# Patient Record
Sex: FEMALE | Race: WHITE | NOT HISPANIC OR LATINO | Employment: FULL TIME | URBAN - METROPOLITAN AREA
[De-identification: names, ages, dates, MRNs, and addresses within clinical notes are randomized per-mention and may not be internally consistent; named-entity substitution may affect disease eponyms.]

---

## 2017-01-29 ENCOUNTER — HOSPITAL ENCOUNTER (EMERGENCY)
Facility: HOSPITAL | Age: 22
Discharge: HOME | End: 2017-01-29
Attending: EMERGENCY MEDICINE | Admitting: EMERGENCY MEDICINE
Payer: COMMERCIAL

## 2017-01-29 ENCOUNTER — APPOINTMENT (EMERGENCY)
Dept: ULTRASOUND IMAGING | Facility: HOSPITAL | Age: 22
End: 2017-01-29
Payer: COMMERCIAL

## 2017-01-29 VITALS
WEIGHT: 145.28 LBS | TEMPERATURE: 98.8 F | SYSTOLIC BLOOD PRESSURE: 123 MMHG | DIASTOLIC BLOOD PRESSURE: 80 MMHG | RESPIRATION RATE: 16 BRPM | HEART RATE: 101 BPM | OXYGEN SATURATION: 95 %

## 2017-01-29 DIAGNOSIS — O20.0 THREATENED MISCARRIAGE IN EARLY PREGNANCY: Primary | ICD-10-CM

## 2017-01-29 LAB
ABO GROUP BLD: NORMAL
B-HCG SERPL-ACNC: 1750 MIU/ML
BACTERIA UR QL AUTO: ABNORMAL /HPF
BILIRUB UR QL STRIP: NEGATIVE
BLD GP AB SCN SERPL QL: NEGATIVE
CLARITY UR: CLEAR
COLOR UR: YELLOW
GLUCOSE UR STRIP-MCNC: NEGATIVE MG/DL
HCG UR QL: POSITIVE
HGB UR QL STRIP.AUTO: ABNORMAL
KETONES UR STRIP-MCNC: NEGATIVE MG/DL
LEUKOCYTE ESTERASE UR QL STRIP: NEGATIVE
MUCOUS THREADS UR QL AUTO: ABNORMAL
NITRITE UR QL STRIP: NEGATIVE
NON-SQ EPI CELLS URNS QL MICRO: ABNORMAL /HPF
PH UR STRIP.AUTO: 6 [PH] (ref 4.5–8)
PROT UR STRIP-MCNC: NEGATIVE MG/DL
RBC #/AREA URNS AUTO: ABNORMAL /HPF
RH BLD: POSITIVE
SP GR UR STRIP.AUTO: 1.02 (ref 1–1.03)
UROBILINOGEN UR QL STRIP.AUTO: 0.2 E.U./DL
WBC #/AREA URNS AUTO: ABNORMAL /HPF

## 2017-01-29 PROCEDURE — 86900 BLOOD TYPING SEROLOGIC ABO: CPT | Performed by: EMERGENCY MEDICINE

## 2017-01-29 PROCEDURE — 99284 EMERGENCY DEPT VISIT MOD MDM: CPT

## 2017-01-29 PROCEDURE — 86901 BLOOD TYPING SEROLOGIC RH(D): CPT | Performed by: EMERGENCY MEDICINE

## 2017-01-29 PROCEDURE — 84702 CHORIONIC GONADOTROPIN TEST: CPT | Performed by: EMERGENCY MEDICINE

## 2017-01-29 PROCEDURE — 81001 URINALYSIS AUTO W/SCOPE: CPT | Performed by: EMERGENCY MEDICINE

## 2017-01-29 PROCEDURE — 87086 URINE CULTURE/COLONY COUNT: CPT | Performed by: EMERGENCY MEDICINE

## 2017-01-29 PROCEDURE — 76856 US EXAM PELVIC COMPLETE: CPT

## 2017-01-29 PROCEDURE — 86850 RBC ANTIBODY SCREEN: CPT | Performed by: EMERGENCY MEDICINE

## 2017-01-29 PROCEDURE — 81025 URINE PREGNANCY TEST: CPT | Performed by: EMERGENCY MEDICINE

## 2017-01-29 PROCEDURE — 36415 COLL VENOUS BLD VENIPUNCTURE: CPT | Performed by: EMERGENCY MEDICINE

## 2017-01-29 PROCEDURE — 76830 TRANSVAGINAL US NON-OB: CPT

## 2017-01-30 LAB — BACTERIA UR CULT: NORMAL

## 2017-08-08 ENCOUNTER — HOSPITAL ENCOUNTER (EMERGENCY)
Facility: HOSPITAL | Age: 22
Discharge: HOME | End: 2017-08-08
Attending: EMERGENCY MEDICINE | Admitting: EMERGENCY MEDICINE

## 2017-08-08 VITALS
SYSTOLIC BLOOD PRESSURE: 97 MMHG | RESPIRATION RATE: 16 BRPM | DIASTOLIC BLOOD PRESSURE: 55 MMHG | HEART RATE: 68 BPM | WEIGHT: 147.71 LBS | OXYGEN SATURATION: 99 % | TEMPERATURE: 98.4 F

## 2017-08-08 DIAGNOSIS — N39.0 URINARY TRACT INFECTION: Primary | ICD-10-CM

## 2017-08-08 LAB
BACTERIA UR QL AUTO: ABNORMAL /HPF
BILIRUB UR QL STRIP: NEGATIVE
CLARITY UR: ABNORMAL
COLOR UR: YELLOW
GLUCOSE UR STRIP-MCNC: NEGATIVE MG/DL
HCG UR QL: NORMAL
HGB UR QL STRIP.AUTO: ABNORMAL
KETONES UR STRIP-MCNC: NEGATIVE MG/DL
LEUKOCYTE ESTERASE UR QL STRIP: ABNORMAL
NITRITE UR QL STRIP: NEGATIVE
NON-SQ EPI CELLS URNS QL MICRO: ABNORMAL /HPF
OTHER STN SPEC: ABNORMAL
PH UR STRIP.AUTO: 5.5 [PH] (ref 4.5–8)
PROT UR STRIP-MCNC: NEGATIVE MG/DL
RBC #/AREA URNS AUTO: ABNORMAL /HPF
SP GR UR STRIP.AUTO: 1.01 (ref 1–1.03)
UROBILINOGEN UR QL STRIP.AUTO: 0.2 E.U./DL
WBC #/AREA URNS AUTO: ABNORMAL /HPF

## 2017-08-08 PROCEDURE — 99283 EMERGENCY DEPT VISIT LOW MDM: CPT

## 2017-08-08 PROCEDURE — 87186 SC STD MICRODIL/AGAR DIL: CPT | Performed by: EMERGENCY MEDICINE

## 2017-08-08 PROCEDURE — 81025 URINE PREGNANCY TEST: CPT | Performed by: EMERGENCY MEDICINE

## 2017-08-08 PROCEDURE — 87077 CULTURE AEROBIC IDENTIFY: CPT | Performed by: EMERGENCY MEDICINE

## 2017-08-08 PROCEDURE — 87086 URINE CULTURE/COLONY COUNT: CPT | Performed by: EMERGENCY MEDICINE

## 2017-08-08 PROCEDURE — 81001 URINALYSIS AUTO W/SCOPE: CPT | Performed by: EMERGENCY MEDICINE

## 2017-08-08 RX ORDER — PHENAZOPYRIDINE HYDROCHLORIDE 200 MG/1
200 TABLET, FILM COATED ORAL 3 TIMES DAILY PRN
Qty: 6 TABLET | Refills: 0 | Status: SHIPPED | OUTPATIENT
Start: 2017-08-08 | End: 2017-08-10

## 2017-08-08 RX ORDER — SULFAMETHOXAZOLE AND TRIMETHOPRIM 800; 160 MG/1; MG/1
1 TABLET ORAL ONCE
Status: COMPLETED | OUTPATIENT
Start: 2017-08-08 | End: 2017-08-08

## 2017-08-08 RX ORDER — SULFAMETHOXAZOLE AND TRIMETHOPRIM 800; 160 MG/1; MG/1
1 TABLET ORAL 2 TIMES DAILY
Qty: 14 TABLET | Refills: 0 | Status: SHIPPED | OUTPATIENT
Start: 2017-08-08 | End: 2017-08-15

## 2017-08-08 RX ORDER — PHENAZOPYRIDINE HYDROCHLORIDE 100 MG/1
200 TABLET, FILM COATED ORAL ONCE
Status: COMPLETED | OUTPATIENT
Start: 2017-08-08 | End: 2017-08-08

## 2017-08-08 RX ADMIN — PHENAZOPYRIDINE HYDROCHLORIDE 200 MG: 100 TABLET ORAL at 08:04

## 2017-08-08 RX ADMIN — SULFAMETHOXAZOLE AND TRIMETHOPRIM 1 TABLET: 800; 160 TABLET ORAL at 08:04

## 2017-08-10 LAB — BACTERIA UR CULT: NORMAL

## 2017-08-14 ENCOUNTER — HOSPITAL ENCOUNTER (EMERGENCY)
Facility: HOSPITAL | Age: 22
Discharge: HOME | End: 2017-08-14
Attending: EMERGENCY MEDICINE | Admitting: EMERGENCY MEDICINE

## 2017-08-14 VITALS
DIASTOLIC BLOOD PRESSURE: 69 MMHG | HEART RATE: 86 BPM | RESPIRATION RATE: 18 BRPM | TEMPERATURE: 98.7 F | SYSTOLIC BLOOD PRESSURE: 129 MMHG | OXYGEN SATURATION: 99 %

## 2017-08-14 DIAGNOSIS — Z34.90 PREGNANCY: Primary | ICD-10-CM

## 2017-08-14 LAB
CLARITY, POC: CLEAR
COLOR, POC: YELLOW
EXT BILIRUBIN, UA: NEGATIVE
EXT BLOOD URINE: NEGATIVE
EXT GLUCOSE, UA: NEGATIVE
EXT KETONES: NEGATIVE
EXT NITRITE, UA: NEGATIVE
EXT PH, UA: 6
EXT PROTEIN, UA: NEGATIVE
EXT SPECIFIC GRAVITY, UA: 1.01
EXT UROBILINOGEN: NEGATIVE
HCG UR QL: POSITIVE
WBC # BLD EST: NEGATIVE 10*3/UL

## 2017-08-14 PROCEDURE — 81025 URINE PREGNANCY TEST: CPT | Performed by: EMERGENCY MEDICINE

## 2017-08-14 PROCEDURE — 81002 URINALYSIS NONAUTO W/O SCOPE: CPT | Performed by: EMERGENCY MEDICINE

## 2017-08-14 PROCEDURE — 99284 EMERGENCY DEPT VISIT MOD MDM: CPT

## 2017-09-07 ENCOUNTER — HOSPITAL ENCOUNTER (EMERGENCY)
Facility: HOSPITAL | Age: 22
Discharge: HOME | End: 2017-09-08
Attending: EMERGENCY MEDICINE | Admitting: EMERGENCY MEDICINE
Payer: COMMERCIAL

## 2017-09-07 ENCOUNTER — APPOINTMENT (EMERGENCY)
Dept: ULTRASOUND IMAGING | Facility: HOSPITAL | Age: 22
End: 2017-09-07
Payer: COMMERCIAL

## 2017-09-07 DIAGNOSIS — O20.0 THREATENED MISCARRIAGE IN EARLY PREGNANCY: Primary | ICD-10-CM

## 2017-09-07 LAB
ABO GROUP BLD: NORMAL
AMORPH PHOS CRY URNS QL MICRO: ABNORMAL /HPF
APTT PPP: 32 SECONDS (ref 23–35)
B-HCG SERPL-ACNC: ABNORMAL MIU/ML
BACTERIA UR QL AUTO: ABNORMAL /HPF
BASOPHILS # BLD AUTO: 0.03 THOUSANDS/ΜL (ref 0–0.1)
BASOPHILS NFR BLD AUTO: 0 % (ref 0–1)
BILIRUB UR QL STRIP: NEGATIVE
BLD GP AB SCN SERPL QL: NEGATIVE
CLARITY UR: CLEAR
COLOR UR: YELLOW
EOSINOPHIL # BLD AUTO: 0.08 THOUSAND/ΜL (ref 0–0.61)
EOSINOPHIL NFR BLD AUTO: 1 % (ref 0–6)
ERYTHROCYTE [DISTWIDTH] IN BLOOD BY AUTOMATED COUNT: 12.3 % (ref 11.6–15.1)
GLUCOSE UR STRIP-MCNC: NEGATIVE MG/DL
HCG UR QL: POSITIVE
HCT VFR BLD AUTO: 35.4 % (ref 34.8–46.1)
HGB BLD-MCNC: 12 G/DL (ref 11.5–15.4)
HGB UR QL STRIP.AUTO: ABNORMAL
INR PPP: 1.08 (ref 0.86–1.16)
KETONES UR STRIP-MCNC: NEGATIVE MG/DL
LEUKOCYTE ESTERASE UR QL STRIP: NEGATIVE
LYMPHOCYTES # BLD AUTO: 2.83 THOUSANDS/ΜL (ref 0.6–4.47)
LYMPHOCYTES NFR BLD AUTO: 33 % (ref 14–44)
MCH RBC QN AUTO: 30.9 PG (ref 26.8–34.3)
MCHC RBC AUTO-ENTMCNC: 33.9 G/DL (ref 31.4–37.4)
MCV RBC AUTO: 91 FL (ref 82–98)
MONOCYTES # BLD AUTO: 0.78 THOUSAND/ΜL (ref 0.17–1.22)
MONOCYTES NFR BLD AUTO: 9 % (ref 4–12)
NEUTROPHILS # BLD AUTO: 4.96 THOUSANDS/ΜL (ref 1.85–7.62)
NEUTS SEG NFR BLD AUTO: 57 % (ref 43–75)
NITRITE UR QL STRIP: NEGATIVE
NON-SQ EPI CELLS URNS QL MICRO: ABNORMAL /HPF
PH UR STRIP.AUTO: 7.5 [PH] (ref 4.5–8)
PLATELET # BLD AUTO: 182 THOUSANDS/UL (ref 149–390)
PMV BLD AUTO: 11 FL (ref 8.9–12.7)
PROT UR STRIP-MCNC: NEGATIVE MG/DL
PROTHROMBIN TIME: 14.3 SECONDS (ref 12.1–14.4)
RBC # BLD AUTO: 3.88 MILLION/UL (ref 3.81–5.12)
RBC #/AREA URNS AUTO: ABNORMAL /HPF
RH BLD: POSITIVE
SP GR UR STRIP.AUTO: 1.01 (ref 1–1.03)
SPECIMEN EXPIRATION DATE: NORMAL
UROBILINOGEN UR QL STRIP.AUTO: 0.2 E.U./DL
WBC # BLD AUTO: 8.68 THOUSAND/UL (ref 4.31–10.16)
WBC #/AREA URNS AUTO: ABNORMAL /HPF

## 2017-09-07 PROCEDURE — 87086 URINE CULTURE/COLONY COUNT: CPT | Performed by: EMERGENCY MEDICINE

## 2017-09-07 PROCEDURE — 86901 BLOOD TYPING SEROLOGIC RH(D): CPT | Performed by: EMERGENCY MEDICINE

## 2017-09-07 PROCEDURE — 81025 URINE PREGNANCY TEST: CPT | Performed by: EMERGENCY MEDICINE

## 2017-09-07 PROCEDURE — 85610 PROTHROMBIN TIME: CPT | Performed by: EMERGENCY MEDICINE

## 2017-09-07 PROCEDURE — 85730 THROMBOPLASTIN TIME PARTIAL: CPT | Performed by: EMERGENCY MEDICINE

## 2017-09-07 PROCEDURE — 86850 RBC ANTIBODY SCREEN: CPT | Performed by: EMERGENCY MEDICINE

## 2017-09-07 PROCEDURE — 36415 COLL VENOUS BLD VENIPUNCTURE: CPT | Performed by: EMERGENCY MEDICINE

## 2017-09-07 PROCEDURE — 81001 URINALYSIS AUTO W/SCOPE: CPT | Performed by: EMERGENCY MEDICINE

## 2017-09-07 PROCEDURE — 87591 N.GONORRHOEAE DNA AMP PROB: CPT | Performed by: EMERGENCY MEDICINE

## 2017-09-07 PROCEDURE — 96360 HYDRATION IV INFUSION INIT: CPT

## 2017-09-07 PROCEDURE — 87491 CHLMYD TRACH DNA AMP PROBE: CPT | Performed by: EMERGENCY MEDICINE

## 2017-09-07 PROCEDURE — 86900 BLOOD TYPING SEROLOGIC ABO: CPT | Performed by: EMERGENCY MEDICINE

## 2017-09-07 PROCEDURE — 84702 CHORIONIC GONADOTROPIN TEST: CPT | Performed by: EMERGENCY MEDICINE

## 2017-09-07 PROCEDURE — 85025 COMPLETE CBC W/AUTO DIFF WBC: CPT | Performed by: EMERGENCY MEDICINE

## 2017-09-07 PROCEDURE — 76801 OB US < 14 WKS SINGLE FETUS: CPT

## 2017-09-07 RX ADMIN — SODIUM CHLORIDE 1000 ML: 0.9 INJECTION, SOLUTION INTRAVENOUS at 22:51

## 2017-09-08 VITALS
OXYGEN SATURATION: 100 % | DIASTOLIC BLOOD PRESSURE: 51 MMHG | WEIGHT: 143 LBS | SYSTOLIC BLOOD PRESSURE: 111 MMHG | TEMPERATURE: 98.1 F | HEART RATE: 72 BPM | RESPIRATION RATE: 18 BRPM

## 2017-09-08 LAB
CHLAMYDIA DNA CVX QL NAA+PROBE: NORMAL
N GONORRHOEA DNA GENITAL QL NAA+PROBE: NORMAL

## 2017-09-08 PROCEDURE — 99284 EMERGENCY DEPT VISIT MOD MDM: CPT

## 2017-09-10 LAB — BACTERIA UR CULT: NORMAL

## 2017-09-12 LAB
CFTR MUT ANL BLD/T: NORMAL
EXTERNAL HEPATITIS B SURFACE ANTIGEN: NORMAL
EXTERNAL HIV-1 ANTIBODY: NORMAL
EXTERNAL RUBELLA IGG QUANTITATION: NORMAL
EXTERNAL SYPHILIS RPR SCREEN: NORMAL

## 2017-09-27 LAB — EXTERNAL GROUP B STREP ANTIGEN: POSITIVE

## 2017-10-05 ENCOUNTER — GENERIC CONVERSION - ENCOUNTER (OUTPATIENT)
Dept: OTHER | Facility: OTHER | Age: 22
End: 2017-10-05

## 2017-10-10 ENCOUNTER — GENERIC CONVERSION - ENCOUNTER (OUTPATIENT)
Dept: OTHER | Facility: OTHER | Age: 22
End: 2017-10-10

## 2017-10-31 ENCOUNTER — GENERIC CONVERSION - ENCOUNTER (OUTPATIENT)
Dept: OTHER | Facility: OTHER | Age: 22
End: 2017-10-31

## 2017-10-31 ENCOUNTER — ALLSCRIPTS OFFICE VISIT (OUTPATIENT)
Dept: OTHER | Facility: OTHER | Age: 22
End: 2017-10-31

## 2017-11-08 LAB — EXTERNAL AFP: NORMAL

## 2017-11-26 ENCOUNTER — APPOINTMENT (EMERGENCY)
Dept: ULTRASOUND IMAGING | Facility: HOSPITAL | Age: 22
End: 2017-11-26
Payer: COMMERCIAL

## 2017-11-26 ENCOUNTER — HOSPITAL ENCOUNTER (EMERGENCY)
Facility: HOSPITAL | Age: 22
Discharge: HOME | End: 2017-11-26
Attending: EMERGENCY MEDICINE | Admitting: EMERGENCY MEDICINE
Payer: COMMERCIAL

## 2017-11-26 VITALS
RESPIRATION RATE: 16 BRPM | TEMPERATURE: 98.1 F | BODY MASS INDEX: 24.68 KG/M2 | HEART RATE: 60 BPM | WEIGHT: 148.15 LBS | HEIGHT: 65 IN | OXYGEN SATURATION: 100 % | SYSTOLIC BLOOD PRESSURE: 92 MMHG | DIASTOLIC BLOOD PRESSURE: 53 MMHG

## 2017-11-26 DIAGNOSIS — R10.2 PELVIC PAIN DURING PREGNANCY: Primary | ICD-10-CM

## 2017-11-26 DIAGNOSIS — O26.899 PELVIC PAIN DURING PREGNANCY: Primary | ICD-10-CM

## 2017-11-26 LAB
ABO GROUP BLD: NORMAL
ALBUMIN SERPL BCP-MCNC: 2.9 G/DL (ref 3.5–5)
ALP SERPL-CCNC: 53 U/L (ref 46–116)
ALT SERPL W P-5'-P-CCNC: 22 U/L (ref 12–78)
ANION GAP SERPL CALCULATED.3IONS-SCNC: 8 MMOL/L (ref 4–13)
AST SERPL W P-5'-P-CCNC: 17 U/L (ref 5–45)
BASOPHILS # BLD AUTO: 0.01 THOUSANDS/ΜL (ref 0–0.1)
BASOPHILS NFR BLD AUTO: 0 % (ref 0–1)
BILIRUB SERPL-MCNC: 0.2 MG/DL (ref 0.2–1)
BLD GP AB SCN SERPL QL: NEGATIVE
BUN SERPL-MCNC: 6 MG/DL (ref 5–25)
CALCIUM SERPL-MCNC: 8.4 MG/DL (ref 8.3–10.1)
CHLORIDE SERPL-SCNC: 105 MMOL/L (ref 100–108)
CLARITY, POC: CLEAR
CO2 SERPL-SCNC: 24 MMOL/L (ref 21–32)
COLOR, POC: YELLOW
CREAT SERPL-MCNC: 0.48 MG/DL (ref 0.6–1.3)
EOSINOPHIL # BLD AUTO: 0.03 THOUSAND/ΜL (ref 0–0.61)
EOSINOPHIL NFR BLD AUTO: 0 % (ref 0–6)
ERYTHROCYTE [DISTWIDTH] IN BLOOD BY AUTOMATED COUNT: 12.8 % (ref 11.6–15.1)
EXT BILIRUBIN, UA: NEGATIVE
EXT BLOOD URINE: NEGATIVE
EXT GLUCOSE, UA: NEGATIVE
EXT KETONES: NEGATIVE
EXT NITRITE, UA: NEGATIVE
EXT PH, UA: 5
EXT PROTEIN, UA: NEGATIVE
EXT SPECIFIC GRAVITY, UA: 1.01
EXT UROBILINOGEN: NEGATIVE
GFR SERPL CREATININE-BSD FRML MDRD: 141 ML/MIN/1.73SQ M
GLUCOSE SERPL-MCNC: 77 MG/DL (ref 65–140)
HCT VFR BLD AUTO: 31.7 % (ref 34.8–46.1)
HGB BLD-MCNC: 10.6 G/DL (ref 11.5–15.4)
LYMPHOCYTES # BLD AUTO: 1.41 THOUSANDS/ΜL (ref 0.6–4.47)
LYMPHOCYTES NFR BLD AUTO: 17 % (ref 14–44)
MCH RBC QN AUTO: 31.3 PG (ref 26.8–34.3)
MCHC RBC AUTO-ENTMCNC: 33.4 G/DL (ref 31.4–37.4)
MCV RBC AUTO: 94 FL (ref 82–98)
MONOCYTES # BLD AUTO: 0.57 THOUSAND/ΜL (ref 0.17–1.22)
MONOCYTES NFR BLD AUTO: 7 % (ref 4–12)
NEUTROPHILS # BLD AUTO: 6.13 THOUSANDS/ΜL (ref 1.85–7.62)
NEUTS SEG NFR BLD AUTO: 76 % (ref 43–75)
PLATELET # BLD AUTO: 187 THOUSANDS/UL (ref 149–390)
PMV BLD AUTO: 10.8 FL (ref 8.9–12.7)
POTASSIUM SERPL-SCNC: 3.4 MMOL/L (ref 3.5–5.3)
PROT SERPL-MCNC: 6.6 G/DL (ref 6.4–8.2)
RBC # BLD AUTO: 3.39 MILLION/UL (ref 3.81–5.12)
RH BLD: POSITIVE
SODIUM SERPL-SCNC: 137 MMOL/L (ref 136–145)
SPECIMEN EXPIRATION DATE: NORMAL
WBC # BLD AUTO: 8.15 THOUSAND/UL (ref 4.31–10.16)
WBC # BLD EST: NEGATIVE 10*3/UL

## 2017-11-26 PROCEDURE — 85025 COMPLETE CBC W/AUTO DIFF WBC: CPT | Performed by: EMERGENCY MEDICINE

## 2017-11-26 PROCEDURE — 86901 BLOOD TYPING SEROLOGIC RH(D): CPT | Performed by: EMERGENCY MEDICINE

## 2017-11-26 PROCEDURE — 96360 HYDRATION IV INFUSION INIT: CPT

## 2017-11-26 PROCEDURE — 86850 RBC ANTIBODY SCREEN: CPT | Performed by: EMERGENCY MEDICINE

## 2017-11-26 PROCEDURE — 76815 OB US LIMITED FETUS(S): CPT

## 2017-11-26 PROCEDURE — 81002 URINALYSIS NONAUTO W/O SCOPE: CPT | Performed by: EMERGENCY MEDICINE

## 2017-11-26 PROCEDURE — 36415 COLL VENOUS BLD VENIPUNCTURE: CPT | Performed by: EMERGENCY MEDICINE

## 2017-11-26 PROCEDURE — 80053 COMPREHEN METABOLIC PANEL: CPT | Performed by: EMERGENCY MEDICINE

## 2017-11-26 PROCEDURE — 86900 BLOOD TYPING SEROLOGIC ABO: CPT | Performed by: EMERGENCY MEDICINE

## 2017-11-26 PROCEDURE — 99284 EMERGENCY DEPT VISIT MOD MDM: CPT

## 2017-11-26 RX ORDER — PNV 119/IRON FUM/FOLIC ACID 29 MG-1 MG
1 TABLET ORAL DAILY
COMMUNITY
End: 2018-08-02 | Stop reason: ALTCHOICE

## 2017-11-26 RX ADMIN — SODIUM CHLORIDE 1000 ML: 0.9 INJECTION, SOLUTION INTRAVENOUS at 12:50

## 2017-11-26 NOTE — ED PROVIDER NOTES
History  Chief Complaint   Patient presents with    Abdominal Pain Pregnant     pt presents with c/o of lower abdominal pressure x 1 week, states taht since last night pain incrased to sharp pain that would come and go  states the LLQ was hurtinf x 3 hours last night and has ceased but this morning the whole lower abdominal region was steady sharp pain currently pain free  deniies N/V, vaginal bleeding, leakage  52-year-old G2 with a history of spontaneous miscarriage in the past comes in complaining of lower abdominal pain and pressure for the past week that has been intermittent  States last night pain increased and made her concerned  Pain at this time is currently resolved  Denies any vaginal bleeding or discharge  Pregnancy Problem   Quality: no bleeding  Severity:  Moderate  Onset quality:  Gradual  Duration:  1 week  Timing:  Intermittent  Progression:  Waxing and waning  Chronicity:  New  Prior pregnancy: no    Pregnancy confirmed by ultrasound: yes    Gestational age:  24  Prenatal care:  Regular prenatal care  Context: at rest    Ineffective treatments:  None tried  Associated symptoms: no abdominal pain, no back pain, no dyspareunia, no fatigue, no fever and no vaginal discharge    Risk factors: prior miscarriage    Risk factors: no bleeding disorder and no terminated pregnancy        Prior to Admission Medications   Prescriptions Last Dose Informant Patient Reported? Taking? Prenatal Vit-DSS-Fe Fum-FA (PRENATAL 19) tablet   Yes Yes   Sig: Take 1 tablet by mouth daily      Facility-Administered Medications: None       Past Medical History:   Diagnosis Date    Anxiety     Depression     Miscarriage        History reviewed  No pertinent surgical history  History reviewed  No pertinent family history  I have reviewed and agree with the history as documented      Social History   Substance Use Topics    Smoking status: Former Smoker     Types: Cigarettes    Smokeless tobacco: Never Used    Alcohol use No        Review of Systems   Constitutional: Negative for fatigue and fever  HENT: Negative for congestion and ear pain  Eyes: Negative for discharge and redness  Respiratory: Negative for apnea, cough, shortness of breath and wheezing  Cardiovascular: Negative for chest pain  Gastrointestinal: Negative for abdominal pain and diarrhea  Endocrine: Negative for cold intolerance and polydipsia  Genitourinary: Negative for difficulty urinating, dyspareunia, hematuria and vaginal discharge  Musculoskeletal: Negative for arthralgias and back pain  Skin: Negative for color change and rash  Allergic/Immunologic: Negative for environmental allergies and immunocompromised state  Neurological: Negative for numbness and headaches  Hematological: Negative for adenopathy  Does not bruise/bleed easily  Psychiatric/Behavioral: Negative for agitation and behavioral problems  Physical Exam  ED Triage Vitals   Temperature Pulse Respirations Blood Pressure SpO2   11/26/17 1230 11/26/17 1229 11/26/17 1229 11/26/17 1229 11/26/17 1229   98 1 °F (36 7 °C) 75 18 110/74 100 %      Temp Source Heart Rate Source Patient Position - Orthostatic VS BP Location FiO2 (%)   11/26/17 1230 11/26/17 1229 11/26/17 1229 11/26/17 1229 --   Oral Monitor Sitting Right arm       Pain Score       11/26/17 1229       No Pain           Orthostatic Vital Signs  Vitals:    11/26/17 1229 11/26/17 1245 11/26/17 1300 11/26/17 1330   BP: 110/74 105/67 99/55 92/53   Pulse: 75 71 64 60   Patient Position - Orthostatic VS: Sitting Sitting Sitting Sitting       Physical Exam   Constitutional: She is oriented to person, place, and time  Vital signs are normal  She appears well-developed and well-nourished  Non-toxic appearance  HENT:   Head: Normocephalic and atraumatic     Right Ear: Tympanic membrane and external ear normal    Left Ear: Tympanic membrane and external ear normal    Nose: Nose normal  No rhinorrhea, sinus tenderness or nasal deformity  Mouth/Throat: Uvula is midline and oropharynx is clear and moist  Normal dentition  Eyes: Conjunctivae, EOM and lids are normal  Pupils are equal, round, and reactive to light  Right eye exhibits no discharge  Left eye exhibits no discharge  Neck: Trachea normal and normal range of motion  Neck supple  No JVD present  Carotid bruit is not present  Cardiovascular: Normal rate, regular rhythm, intact distal pulses and normal pulses  No extrasystoles are present  PMI is not displaced  Pulmonary/Chest: Effort normal and breath sounds normal  No accessory muscle usage  No respiratory distress  She has no wheezes  She has no rhonchi  She has no rales  Abdominal: Soft  Normal appearance and bowel sounds are normal  She exhibits no mass  There is no tenderness  There is no rigidity, no rebound and no guarding  Musculoskeletal:        Right shoulder: She exhibits normal range of motion, no bony tenderness, no swelling and no deformity  Cervical back: Normal  She exhibits normal range of motion, no tenderness, no bony tenderness and no deformity  Lymphadenopathy:     She has no cervical adenopathy  She has no axillary adenopathy  Neurological: She is alert and oriented to person, place, and time  She has normal strength and normal reflexes  No cranial nerve deficit or sensory deficit  GCS eye subscore is 4  GCS verbal subscore is 5  GCS motor subscore is 6  Skin: Skin is warm and dry  No rash noted  Psychiatric: She has a normal mood and affect  Her speech is normal and behavior is normal    Nursing note and vitals reviewed        ED Medications  Medications   sodium chloride 0 9 % bolus 1,000 mL (0 mL Intravenous Stopped 11/26/17 1350)       Diagnostic Studies  Results Reviewed     Procedure Component Value Units Date/Time    Comprehensive metabolic panel [[id number]]  (Abnormal) Collected:  11/26/17 1302    Lab Status:  Final result Specimen: Blood from Arm, Right Updated:  11/26/17 1326     Sodium 137 mmol/L      Potassium 3 4 (L) mmol/L      Chloride 105 mmol/L      CO2 24 mmol/L      Anion Gap 8 mmol/L      BUN 6 mg/dL      Creatinine 0 48 (L) mg/dL      Glucose 77 mg/dL      Calcium 8 4 mg/dL      AST 17 U/L      ALT 22 U/L      Alkaline Phosphatase 53 U/L      Total Protein 6 6 g/dL      Albumin 2 9 (L) g/dL      Total Bilirubin 0 20 mg/dL      eGFR 141 ml/min/1 73sq m     Narrative:         National Kidney Disease Education Program recommendations are as follows:  GFR calculation is accurate only with a steady state creatinine  Chronic Kidney disease less than 60 ml/min/1 73 sq  meters  Kidney failure less than 15 ml/min/1 73 sq  meters      CBC and differential [[id number]]  (Abnormal) Collected:  11/26/17 1302    Lab Status:  Final result Specimen:  Blood from Arm, Right Updated:  11/26/17 1309     WBC 8 15 Thousand/uL      RBC 3 39 (L) Million/uL      Hemoglobin 10 6 (L) g/dL      Hematocrit 31 7 (L) %      MCV 94 fL      MCH 31 3 pg      MCHC 33 4 g/dL      RDW 12 8 %      MPV 10 8 fL      Platelets 670 Thousands/uL      Neutrophils Relative 76 (H) %      Lymphocytes Relative 17 %      Monocytes Relative 7 %      Eosinophils Relative 0 %      Basophils Relative 0 %      Neutrophils Absolute 6 13 Thousands/µL      Lymphocytes Absolute 1 41 Thousands/µL      Monocytes Absolute 0 57 Thousand/µL      Eosinophils Absolute 0 03 Thousand/µL      Basophils Absolute 0 01 Thousands/µL     POCT urinalysis dipstick [[id number]]  (Normal) Resulted:  11/26/17 1243    Lab Status:  Final result Specimen:  Urine from Urine, Other Updated:  11/26/17 1244     Color, UA yellow     Clarity, UA clear     EXT Glucose, UA negative     EXT Bilirubin, UA (Ref: Negative) negative     EXT Ketones, UA (Ref: Negative) negative     EXT Spec Grav, UA 1 010     EXT Blood, UA (Ref: Negative) negative     EXT pH, UA 5 0     EXT Protein, UA (Ref: Negative) negative     EXT Urobilinogen, UA (Ref: 0 2- 1 0) negative     EXT Leukocytes, UA (Ref: Negative) negative     EXT Nitrite, UA (Ref: Negative) negative                 US OB limited 1 + fetuses   Final Result by Jeanette Akins MD (11/26 1347)      Single live intrauterine gestation at 19 weeks 3  days with appropriate interval growth  No acute abnormality identified  Workstation performed: [id number]                    Procedures  Procedures       Phone Contacts  ED Phone Contact    ED Course  ED Course                                MDM  Number of Diagnoses or Management Options  Pelvic pain during pregnancy: new and requires workup     Amount and/or Complexity of Data Reviewed  Clinical lab tests: ordered and reviewed  Tests in the radiology section of CPT®: ordered and reviewed  Tests in the medicine section of CPT®: ordered and reviewed    Patient Progress  Patient progress: improved    CritCare Time    Disposition  Final diagnoses:   Pelvic pain during pregnancy     Time reflects when diagnosis was documented in both MDM as applicable and the Disposition within this note     Time User Action Codes Description Comment    11/26/2017  1:54 PM Watt Apt Add [O26 899,  R10 2] Pelvic pain during pregnancy       ED Disposition     ED Disposition Condition Comment    Discharge  Nayeli Jon discharge to home/self care  Condition at discharge: Good        Follow-up Information     Follow up With Specialties Details Why Contact Info    Phyllis Deluna MD Obstetrics and Gynecology Schedule an appointment as soon as possible for a visit  [address] [place] [address]  [phone]          Discharge Medication List as of 11/26/2017  1:55 PM      CONTINUE these medications which have NOT CHANGED    Details   Prenatal Vit-DSS-Fe Fum-FA (PRENATAL 19) tablet Take 1 tablet by mouth daily, Historical Med           No discharge procedures on file      ED Provider  Electronically Signed by           Kiana Leung [address],   11/27/17 9298

## 2017-11-26 NOTE — DISCHARGE INSTRUCTIONS
Abdominal Pain in Pregnancy   WHAT YOU NEED TO KNOW:   Abdominal pain during pregnancy is common  Some of the causes include heartburn, constipation, gas, false labor, and round ligament pain  Round ligament pain is caused by stretching of the ligaments that support your uterus  Abdominal pain may be caused by a health problem, such as a stomach virus or appendicitis (inflammation of the appendix)  The pain may also be caused by a problem with your pregnancy, such as a threatened miscarriage or  labor  DISCHARGE INSTRUCTIONS:   Follow up with your obstetrician within 3 days:  Write down your questions so you remember to ask them during your visits  Self-care:   · Rest may help to relieve round ligament pain  Ask your healthcare provider about other ways to relieve this pain, such as a supportive belt or pregnancy exercises  · Use a heating pad set to the lowest setting or a hot compress to apply heat to your abdomen  Do this for 20 to 30 minutes every 2 hours for as many days as directed  · Avoid quick changes in position or movements that cause pain  · Do not lie flat in bed or bend over if you have heartburn  Ask your obstetrician if you should make any changes to the foods you eat  Ask if you can take any medicines for heartburn  · Eat more fiber and drink more liquids to relieve constipation  Fiber is found in fruits, vegetables, and whole-grain foods, such as whole-wheat bread and cereals  Ask how much liquid to drink each day and which liquids are best for you  Contact your obstetrician if:  · You continue to have abdominal pain that cannot be relieved  · You have a fever  · You have questions or concerns about your condition or care  Return to the emergency department if:   · You have sudden, severe pain or cramps that are so bad that you cannot walk or talk  · You have a fast heartbeat, shortness of breath, and you feel lightheaded or faint       · You have vaginal bleeding or discharge  · You have nausea, vomiting, fever, and severe pain on your right side  © 2017 [facility] Information is for End User's use only and may not be sold, redistributed or otherwise used for commercial purposes  All illustrations and images included in CareNotes® are the copyrighted property of [employer] [facility]  or [address]  The above information is an  only  It is not intended as medical advice for individual conditions or treatments  Talk to your doctor, nurse or pharmacist before following any medical regimen to see if it is safe and effective for you

## 2017-11-26 NOTE — ED NOTES
Problem: Patient Care Overview  Goal: Plan of Care Review  Outcome: Ongoing (interventions implemented as appropriate)  Pt remains free of falls and injuries. Pt s/p lap ricarda on 4/26. Abd incisions with telfa island dressings CDI w/ dried drainage. Pt tolerating regular diet. IV abx cont as scheduled. Low grade fevers tonight, now resolved. Pt cont with use of I.S.; cont to encourage turn, cough, deep breathing. Pain managed with PRN pain med; no other complaints, will cont to monitor.        US technician at the bedside for diagnostic procedure     Damari Lynch, DARWIN  11/26/17 6743

## 2017-11-30 ENCOUNTER — GENERIC CONVERSION - ENCOUNTER (OUTPATIENT)
Dept: OBGYN CLINIC | Facility: CLINIC | Age: 22
End: 2017-11-30

## 2017-12-29 ENCOUNTER — GENERIC CONVERSION - ENCOUNTER (OUTPATIENT)
Dept: OTHER | Facility: OTHER | Age: 22
End: 2017-12-29

## 2017-12-29 DIAGNOSIS — Z34.02 ENCOUNTER FOR SUPERVISION OF NORMAL FIRST PREGNANCY IN SECOND TRIMESTER: ICD-10-CM

## 2018-01-08 ENCOUNTER — APPOINTMENT (OUTPATIENT)
Dept: LAB | Facility: CLINIC | Age: 23
End: 2018-01-08
Payer: COMMERCIAL

## 2018-01-08 DIAGNOSIS — Z34.02 ENCOUNTER FOR SUPERVISION OF NORMAL FIRST PREGNANCY IN SECOND TRIMESTER: ICD-10-CM

## 2018-01-08 LAB
BASOPHILS # BLD AUTO: 0.01 THOUSANDS/ΜL (ref 0–0.1)
BASOPHILS NFR BLD AUTO: 0 % (ref 0–1)
EOSINOPHIL # BLD AUTO: 0.18 THOUSAND/ΜL (ref 0–0.61)
EOSINOPHIL NFR BLD AUTO: 2 % (ref 0–6)
ERYTHROCYTE [DISTWIDTH] IN BLOOD BY AUTOMATED COUNT: 12.7 % (ref 11.6–15.1)
GLUCOSE 1H P 50 G GLC PO SERPL-MCNC: 106 MG/DL
HCT VFR BLD AUTO: 29 % (ref 34.8–46.1)
HGB BLD-MCNC: 9.9 G/DL (ref 11.5–15.4)
LYMPHOCYTES # BLD AUTO: 1.49 THOUSANDS/ΜL (ref 0.6–4.47)
LYMPHOCYTES NFR BLD AUTO: 19 % (ref 14–44)
MCH RBC QN AUTO: 32.4 PG (ref 26.8–34.3)
MCHC RBC AUTO-ENTMCNC: 34.1 G/DL (ref 31.4–37.4)
MCV RBC AUTO: 95 FL (ref 82–98)
MONOCYTES # BLD AUTO: 0.72 THOUSAND/ΜL (ref 0.17–1.22)
MONOCYTES NFR BLD AUTO: 9 % (ref 4–12)
NEUTROPHILS # BLD AUTO: 5.34 THOUSANDS/ΜL (ref 1.85–7.62)
NEUTS SEG NFR BLD AUTO: 70 % (ref 43–75)
NRBC BLD AUTO-RTO: 0 /100 WBCS
PLATELET # BLD AUTO: 211 THOUSANDS/UL (ref 149–390)
PMV BLD AUTO: 11.4 FL (ref 8.9–12.7)
RBC # BLD AUTO: 3.06 MILLION/UL (ref 3.81–5.12)
WBC # BLD AUTO: 7.76 THOUSAND/UL (ref 4.31–10.16)

## 2018-01-08 PROCEDURE — 85025 COMPLETE CBC W/AUTO DIFF WBC: CPT

## 2018-01-08 PROCEDURE — 36415 COLL VENOUS BLD VENIPUNCTURE: CPT

## 2018-01-08 PROCEDURE — 82950 GLUCOSE TEST: CPT

## 2018-01-09 NOTE — MISCELLANEOUS
Attached is your new obstetrical paperwork package for your upcoming appointment  Please fill out all highlighted sections of Parts 2, 3 and 4 and complete  the questionnaire and consent forms in their entirety  Please contact your insurance company to find out which lab you must use for bloodwork  Also, please bring all completed paperwork with you to your first appointment  Prior to your first  appointment, please fax or email a copy of your current insurance card (both sides) in order for our office to pre-certify your insurance  Our fax number is [phone], roslyn Lopez or email to  [email or web address]  Arrive at least 20 minutes prior to your appointment time

## 2018-01-20 ENCOUNTER — OB ABSTRACT (OUTPATIENT)
Dept: OBGYN CLINIC | Facility: CLINIC | Age: 23
End: 2018-01-20

## 2018-01-20 LAB
BACTERIA UR CULT: ABNORMAL COL/ML
CANDIDA RRNA VAG QL PROBE: ABNORMAL
Lab: NORMAL
T VAGINALIS RRNA GENITAL QL PROBE: NORMAL

## 2018-01-22 VITALS
SYSTOLIC BLOOD PRESSURE: 108 MMHG | DIASTOLIC BLOOD PRESSURE: 70 MMHG | BODY MASS INDEX: 24.16 KG/M2 | WEIGHT: 145 LBS | HEIGHT: 65 IN

## 2018-01-22 VITALS — WEIGHT: 145 LBS | BODY MASS INDEX: 24.13 KG/M2 | DIASTOLIC BLOOD PRESSURE: 68 MMHG | SYSTOLIC BLOOD PRESSURE: 118 MMHG

## 2018-01-24 VITALS — DIASTOLIC BLOOD PRESSURE: 66 MMHG | SYSTOLIC BLOOD PRESSURE: 102 MMHG | WEIGHT: 154 LBS | BODY MASS INDEX: 25.63 KG/M2

## 2018-01-29 ENCOUNTER — ROUTINE PRENATAL (OUTPATIENT)
Dept: OBGYN CLINIC | Facility: CLINIC | Age: 23
End: 2018-01-29
Payer: COMMERCIAL

## 2018-01-29 VITALS — BODY MASS INDEX: 27.29 KG/M2 | WEIGHT: 164 LBS | DIASTOLIC BLOOD PRESSURE: 80 MMHG | SYSTOLIC BLOOD PRESSURE: 112 MMHG

## 2018-01-29 DIAGNOSIS — Z3A.28 28 WEEKS GESTATION OF PREGNANCY: Primary | ICD-10-CM

## 2018-01-29 DIAGNOSIS — Z36.89 ULTRASOUND SCAN TO CHECK INTERVAL GROWTH OF FETUS: Primary | ICD-10-CM

## 2018-01-29 PROCEDURE — 99213 OFFICE O/P EST LOW 20 MIN: CPT | Performed by: OBSTETRICS & GYNECOLOGY

## 2018-01-29 NOTE — PROGRESS NOTES
Patient reports good fm, no n/v, headache, cramping, bleeding, loss of fluid, edema, dom violence, or smoking  rhianna pnv  Patient tolerating iron  Patient will schedule  Center ultrasound to evaluate growth  We should recheck CBC in 2-4 weeks  Return in 2 weeks or sooner as needed

## 2018-02-17 ENCOUNTER — ROUTINE PRENATAL (OUTPATIENT)
Dept: OBGYN CLINIC | Facility: CLINIC | Age: 23
End: 2018-02-17
Payer: COMMERCIAL

## 2018-02-17 VITALS — SYSTOLIC BLOOD PRESSURE: 120 MMHG | BODY MASS INDEX: 27.79 KG/M2 | WEIGHT: 167 LBS | DIASTOLIC BLOOD PRESSURE: 62 MMHG

## 2018-02-17 DIAGNOSIS — Z3A.31 31 WEEKS GESTATION OF PREGNANCY: ICD-10-CM

## 2018-02-17 PROBLEM — Z82.79 FAMILY HISTORY OF CONGENITAL ANOMALY: Status: ACTIVE | Noted: 2017-10-05

## 2018-02-17 PROBLEM — Z84.89 FAMILY HISTORY OF GENETIC DISEASE: Status: ACTIVE | Noted: 2017-10-05

## 2018-02-17 PROCEDURE — 99213 OFFICE O/P EST LOW 20 MIN: CPT | Performed by: OBSTETRICS & GYNECOLOGY

## 2018-02-17 NOTE — PROGRESS NOTES
Visit:  Good FM - tolerating PNV and Fe supplement - has growth US scheduled next week - given checking in information - reviewed TDap

## 2018-02-25 ENCOUNTER — HOSPITAL ENCOUNTER (EMERGENCY)
Facility: HOSPITAL | Age: 23
Discharge: HOME | End: 2018-02-25
Attending: EMERGENCY MEDICINE
Payer: COMMERCIAL

## 2018-02-25 VITALS
DIASTOLIC BLOOD PRESSURE: 61 MMHG | HEART RATE: 66 BPM | BODY MASS INDEX: 27.89 KG/M2 | SYSTOLIC BLOOD PRESSURE: 103 MMHG | RESPIRATION RATE: 16 BRPM | OXYGEN SATURATION: 99 % | WEIGHT: 167.6 LBS | TEMPERATURE: 98.3 F

## 2018-02-25 DIAGNOSIS — O21.9 NAUSEA AND VOMITING DURING PREGNANCY: Primary | ICD-10-CM

## 2018-02-25 LAB
ANION GAP SERPL CALCULATED.3IONS-SCNC: 8 MMOL/L (ref 4–13)
BASOPHILS # BLD AUTO: 0.01 THOUSANDS/ΜL (ref 0–0.1)
BASOPHILS NFR BLD AUTO: 0 % (ref 0–1)
BILIRUB UR QL STRIP: NEGATIVE
BUN SERPL-MCNC: 8 MG/DL (ref 5–25)
CALCIUM SERPL-MCNC: 8.4 MG/DL (ref 8.3–10.1)
CHLORIDE SERPL-SCNC: 105 MMOL/L (ref 100–108)
CLARITY UR: CLEAR
CO2 SERPL-SCNC: 25 MMOL/L (ref 21–32)
COLOR UR: YELLOW
CREAT SERPL-MCNC: 0.56 MG/DL (ref 0.6–1.3)
EOSINOPHIL # BLD AUTO: 0.07 THOUSAND/ΜL (ref 0–0.61)
EOSINOPHIL NFR BLD AUTO: 1 % (ref 0–6)
ERYTHROCYTE [DISTWIDTH] IN BLOOD BY AUTOMATED COUNT: 12.6 % (ref 11.6–15.1)
GFR SERPL CREATININE-BSD FRML MDRD: 134 ML/MIN/1.73SQ M
GLUCOSE SERPL-MCNC: 89 MG/DL (ref 65–140)
GLUCOSE UR STRIP-MCNC: NEGATIVE MG/DL
HCT VFR BLD AUTO: 31.1 % (ref 34.8–46.1)
HGB BLD-MCNC: 10.2 G/DL (ref 11.5–15.4)
HGB UR QL STRIP.AUTO: NEGATIVE
KETONES UR STRIP-MCNC: NEGATIVE MG/DL
LEUKOCYTE ESTERASE UR QL STRIP: NEGATIVE
LYMPHOCYTES # BLD AUTO: 1.97 THOUSANDS/ΜL (ref 0.6–4.47)
LYMPHOCYTES NFR BLD AUTO: 19 % (ref 14–44)
MCH RBC QN AUTO: 30.7 PG (ref 26.8–34.3)
MCHC RBC AUTO-ENTMCNC: 32.8 G/DL (ref 31.4–37.4)
MCV RBC AUTO: 94 FL (ref 82–98)
MONOCYTES # BLD AUTO: 0.76 THOUSAND/ΜL (ref 0.17–1.22)
MONOCYTES NFR BLD AUTO: 8 % (ref 4–12)
NEUTROPHILS # BLD AUTO: 7.36 THOUSANDS/ΜL (ref 1.85–7.62)
NEUTS SEG NFR BLD AUTO: 72 % (ref 43–75)
NITRITE UR QL STRIP: NEGATIVE
PH UR STRIP.AUTO: 6.5 [PH] (ref 4.5–8)
PLATELET # BLD AUTO: 217 THOUSANDS/UL (ref 149–390)
PMV BLD AUTO: 10.7 FL (ref 8.9–12.7)
POTASSIUM SERPL-SCNC: 3.4 MMOL/L (ref 3.5–5.3)
PROT UR STRIP-MCNC: NEGATIVE MG/DL
RBC # BLD AUTO: 3.32 MILLION/UL (ref 3.81–5.12)
SODIUM SERPL-SCNC: 138 MMOL/L (ref 136–145)
SP GR UR STRIP.AUTO: 1.02 (ref 1–1.03)
UROBILINOGEN UR QL STRIP.AUTO: 0.2 E.U./DL
WBC # BLD AUTO: 10.17 THOUSAND/UL (ref 4.31–10.16)

## 2018-02-25 PROCEDURE — 99284 EMERGENCY DEPT VISIT MOD MDM: CPT

## 2018-02-25 PROCEDURE — 80048 BASIC METABOLIC PNL TOTAL CA: CPT | Performed by: EMERGENCY MEDICINE

## 2018-02-25 PROCEDURE — 85025 COMPLETE CBC W/AUTO DIFF WBC: CPT | Performed by: EMERGENCY MEDICINE

## 2018-02-25 PROCEDURE — 81003 URINALYSIS AUTO W/O SCOPE: CPT

## 2018-02-25 PROCEDURE — 36415 COLL VENOUS BLD VENIPUNCTURE: CPT | Performed by: EMERGENCY MEDICINE

## 2018-02-25 PROCEDURE — 96360 HYDRATION IV INFUSION INIT: CPT

## 2018-02-25 RX ORDER — DOXYLAMINE SUCCINATE AND PYRIDOXINE HYDROCHLORIDE, DELAYED RELEASE TABLETS 10 MG/10 MG 10; 10 MG/1; MG/1
1 TABLET, DELAYED RELEASE ORAL 2 TIMES DAILY
Qty: 15 TABLET | Refills: 0 | Status: SHIPPED | OUTPATIENT
Start: 2018-02-25 | End: 2018-03-16

## 2018-02-25 RX ORDER — PYRIDOXINE HCL (VITAMIN B6) 50 MG
25 TABLET ORAL DAILY
Status: DISCONTINUED | OUTPATIENT
Start: 2018-02-26 | End: 2018-02-25 | Stop reason: HOSPADM

## 2018-02-25 RX ADMIN — DOXYLAMINE SUCCINATE: 25 TABLET ORAL at 17:47

## 2018-02-25 RX ADMIN — SODIUM CHLORIDE 1000 ML: 0.9 INJECTION, SOLUTION INTRAVENOUS at 17:46

## 2018-02-25 NOTE — ED PROVIDER NOTES
History  Chief Complaint   Patient presents with    Vomiting During Pregnancy     Pt presents to ED from home after vomiting 8x since 0200 today  Pt having pain in abd while vomiting  Pt 32 weeks pregnant  Pt having normal pregnancy  Pt (-) health hx      a34-year-old female, 32 weeks pregnant, presents with chief complaint of multiple episodes of nausea and vomiting since earlier this afternoon  Patient states that she has not felt sick or nauseous prior to this but then she has tried to eat today she has had multiple episodes of nausea vomiting  Patient reports she had some morning sickness early on in the pregnancy but has not had any since  No fevers, chills, unusual food exposure, recent travel, sick contacts  Patient denies vaginal discharge or bleeding  Patient states she has some mild abdominal cramping when the vomiting but not at rest   No back pain  History provided by:  Patient   used: No    Vomiting   Severity:  Moderate  Duration:  2 hours  Timing:  Rare  Quality:  Stomach contents  Progression:  Unchanged  Chronicity:  New  Recent urination:  Normal  Relieved by:  Nothing  Worsened by:  Food smell and liquids  Ineffective treatments:  None tried  Associated symptoms: abdominal pain (with emesis but not at rest)    Associated symptoms: no chills, no diarrhea and no fever    Risk factors: pregnant    Risk factors: no sick contacts, no suspect food intake and no travel to endemic areas        Prior to Admission Medications   Prescriptions Last Dose Informant Patient Reported? Taking? Prenatal Vit-DSS-Fe Fum-FA (PRENATAL 19) tablet   Yes No   Sig: Take 1 tablet by mouth daily      Facility-Administered Medications: None       Past Medical History:   Diagnosis Date    Anxiety     Depression     HX OF ANXIETY    Miscarriage     Varicella     IMMUNE       History reviewed  No pertinent surgical history      Family History   Problem Relation Age of Onset    Breast cancer Mother     Ovarian cancer Mother     Hypertension Father     Diabetes Maternal Aunt      I have reviewed and agree with the history as documented  Social History   Substance Use Topics    Smoking status: Former Smoker     Types: Cigarettes     Quit date: 2016    Smokeless tobacco: Never Used    Alcohol use No      Comment: Social use per [employer]        Review of Systems   Constitutional: Negative for chills, diaphoresis and fever  Respiratory: Negative for shortness of breath  Cardiovascular: Negative for chest pain and palpitations  Gastrointestinal: Positive for abdominal pain (with emesis but not at rest) and vomiting  Negative for diarrhea and nausea  Genitourinary: Negative for dysuria, frequency, hematuria, vaginal discharge and vaginal pain  Skin: Negative for rash  All other systems reviewed and are negative  Physical Exam  ED Triage Vitals [02/25/18 1544]   Temperature Pulse Respirations Blood Pressure SpO2   98 3 °F (36 8 °C) 65 16 115/63 98 %      Temp Source Heart Rate Source Patient Position - Orthostatic VS BP Location FiO2 (%)   Oral Monitor Sitting Left arm --      Pain Score       No Pain           Orthostatic Vital Signs  Vitals:    02/25/18 1544   BP: 115/63   Pulse: 65   Patient Position - Orthostatic VS: Sitting       Physical Exam   Constitutional: She is oriented to person, place, and time  She appears well-developed and well-nourished  She appears distressed (mild)  HENT:   Head: Normocephalic and atraumatic  Eyes: EOM are normal  Pupils are equal, round, and reactive to light  Neck: Normal range of motion  No JVD present  Cardiovascular: Normal rate, regular rhythm, normal heart sounds and intact distal pulses  Exam reveals no gallop and no friction rub  No murmur heard  Pulmonary/Chest: Effort normal and breath sounds normal  No respiratory distress  She has no wheezes  She has no rales  She exhibits no tenderness     Abdominal:   Gravid abdomen consistent with 3rd trimester     Musculoskeletal: Normal range of motion  She exhibits no tenderness  Neurological: She is alert and oriented to person, place, and time  Skin: Skin is warm and dry  Psychiatric: She has a normal mood and affect  Her behavior is normal  Judgment and thought content normal    Nursing note and vitals reviewed  ED Medications  Medications   pyridoxine (VITAMIN B6) tablet 25 mg (not administered)   sodium chloride 0 9 % bolus 1,000 mL (1,000 mL Intravenous New Bag 2/25/18 1746)   doxylamine (UNISON) tablet 12 5 mg ( Oral Given 2/25/18 1747)       Diagnostic Studies  Results Reviewed     Procedure Component Value Units Date/Time    Basic metabolic panel [[id number]]  (Abnormal) Collected:  02/25/18 1749    Lab Status:  Final result Specimen:  Blood from Arm, Right Updated:  02/25/18 1810     Sodium 138 mmol/L      Potassium 3 4 (L) mmol/L      Chloride 105 mmol/L      CO2 25 mmol/L      Anion Gap 8 mmol/L      BUN 8 mg/dL      Creatinine 0 56 (L) mg/dL      Glucose 89 mg/dL      Calcium 8 4 mg/dL      eGFR 134 ml/min/1 73sq m     Narrative:         National Kidney Disease Education Program recommendations are as follows:  GFR calculation is accurate only with a steady state creatinine  Chronic Kidney disease less than 60 ml/min/1 73 sq  meters  Kidney failure less than 15 ml/min/1 73 sq  meters      CBC and differential [[id number]]  (Abnormal) Collected:  02/25/18 1749    Lab Status:  Final result Specimen:  Blood from Arm, Right Updated:  02/25/18 1756     WBC 10 17 (H) Thousand/uL      RBC 3 32 (L) Million/uL      Hemoglobin 10 2 (L) g/dL      Hematocrit 31 1 (L) %      MCV 94 fL      MCH 30 7 pg      MCHC 32 8 g/dL      RDW 12 6 %      MPV 10 7 fL      Platelets 437 Thousands/uL      Neutrophils Relative 72 %      Lymphocytes Relative 19 %      Monocytes Relative 8 %      Eosinophils Relative 1 %      Basophils Relative 0 %      Neutrophils Absolute 7 36 Thousands/µL Lymphocytes Absolute 1 97 Thousands/µL      Monocytes Absolute 0 76 Thousand/µL      Eosinophils Absolute 0 07 Thousand/µL      Basophils Absolute 0 01 Thousands/µL                  No orders to display              Procedures  Procedures       Phone Contacts  ED Phone Contact    ED Course  ED Course                                MDM  Number of Diagnoses or Management Options  Nausea and vomiting during pregnancy: new and requires workup  Diagnosis management comments: Background: 25 y o  female presents with nausea and vomiting in 3rd trimester    Differential DX includes but is not limited to: gastritis, gastroparesis, gestational diabetes, food poisoning, viral disease, doubt hellp syndrome, doubt premature labor    Plan: IV hydration, cbc, cmp, urinalysis - combination of pyridoxine and doxyalamine therapy       Amount and/or Complexity of Data Reviewed  Clinical lab tests: ordered and reviewed    Risk of Complications, Morbidity, and/or Mortality  Diagnostic procedures: high  Management options: high    Patient Progress  Patient progress: stable    CritCare Time    Disposition  Final diagnoses:   Nausea and vomiting during pregnancy     Time reflects when diagnosis was documented in both MDM as applicable and the Disposition within this note     Time User Action Codes Description Comment    2/25/2018  6:21 PM Angeles CHRISTIANSON Add [O21 9] Nausea and vomiting during pregnancy       ED Disposition     ED Disposition Condition Comment    Discharge  Nayeli Jon discharge to home/self care      Condition at discharge: Good        Follow-up Information     Follow up With Specialties Details Why Brittaney Colindres MD Obstetrics and Gynecology Schedule an appointment as soon as possible for a visit As needed [address]  [phone]          Patient's Medications   Discharge Prescriptions    DOXYLAMINE-PYRIDOXINE 10-10 MG TBEC    Take 1 tablet by mouth 2 (two) times a day for 15 doses       Start Date: 2/25/2018 End Date: 3/5/2018       Order Dose: 1 tablet       Quantity: 15 tablet    Refills: 0     No discharge procedures on file      ED Provider  Electronically Signed by           Sanjana Moore MD  02/25/18 2793

## 2018-02-25 NOTE — DISCHARGE INSTRUCTIONS
Acute Nausea and Vomiting   WHAT YOU NEED TO KNOW:   Acute nausea and vomiting start suddenly, worsen quickly, and last a short time  DISCHARGE INSTRUCTIONS:   Return to the emergency department if:   · You see blood in your vomit or your bowel movements  · You have sudden, severe pain in your chest and upper abdomen after hard vomiting or retching  · You have swelling in your neck and chest      · You are dizzy, cold, and thirsty and your eyes and mouth are dry  · You are urinating very little or not at all  · You have muscle weakness, leg cramps, and trouble breathing  · Your heart is beating much faster than normal      · You continue to vomit for more than 48 hours  Contact your healthcare provider if:   · You have frequent dry heaves (vomiting but nothing comes out)  · Your nausea and vomiting does not get better or go away after you use medicine  · You have questions or concerns about your condition or treatment  Medicines: You may need any of the following:  · Medicines  may be given to calm your stomach and stop your vomiting  You may also need medicines to help you feel more relaxed or to stop nausea and vomiting caused by motion sickness  · Gastrointestinal stimulants  are used to help empty your stomach and bowels  This may help decrease nausea and vomiting  · Take your medicine as directed  Contact your healthcare provider if you think your medicine is not helping or if you have side effects  Tell him or her if you are allergic to any medicine  Keep a list of the medicines, vitamins, and herbs you take  Include the amounts, and when and why you take them  Bring the list or the pill bottles to follow-up visits  Carry your medicine list with you in case of an emergency  Prevent or manage acute nausea and vomiting:   · Do not drink alcohol  Alcohol may upset or irritate your stomach  Too much alcohol can also cause acute nausea and vomiting  · Control stress    Headaches due to stress may cause nausea and vomiting  Find ways to relax and manage your stress  Get more rest and sleep  · Drink more liquids as directed  Vomiting can lead to dehydration  It is important to drink more liquids to help replace lost body fluids  Ask your healthcare provider how much liquid to drink each day and which liquids are best for you  Your provider may recommend that you drink an oral rehydration solution (ORS)  ORS contains water, salts, and sugar that are needed to replace the lost body fluids  Ask what kind of ORS to use, how much to drink, and where to get it  · Eat smaller meals, more often  Eat small amounts of food every 2 to 3 hours, even if you are not hungry  Food in your stomach may decrease your nausea  · Talk to your healthcare provider before you take over-the-counter (OTC) medicines  These medicines can cause serious problems if you use certain other medicines, or you have a medical condition  You may have problems if you use too much or use them for longer than the label says  Follow directions on the label carefully  Follow up with your healthcare provider as directed:  Write down your questions so you remember to ask them during your follow-up visits  © 2017 [employer] Information is for End User's use only and may not be sold, redistributed or otherwise used for commercial purposes  All illustrations and images included in CareNotes® are the copyrighted property of A D A M , Inc  or [facility]  The above information is an  only  It is not intended as medical advice for individual conditions or treatments  Talk to your doctor, nurse or pharmacist before following any medical regimen to see if it is safe and effective for you

## 2018-03-03 ENCOUNTER — ROUTINE PRENATAL (OUTPATIENT)
Dept: OBGYN CLINIC | Facility: CLINIC | Age: 23
End: 2018-03-03
Payer: COMMERCIAL

## 2018-03-03 VITALS — BODY MASS INDEX: 28.62 KG/M2 | WEIGHT: 172 LBS | SYSTOLIC BLOOD PRESSURE: 112 MMHG | DIASTOLIC BLOOD PRESSURE: 78 MMHG

## 2018-03-03 DIAGNOSIS — Z34.83 MULTIGRAVIDA IN THIRD TRIMESTER: Primary | ICD-10-CM

## 2018-03-03 PROCEDURE — 99214 OFFICE O/P EST MOD 30 MIN: CPT | Performed by: PHYSICIAN ASSISTANT

## 2018-03-03 NOTE — PROGRESS NOTES
Pt with complaints of insomnia, unable to fall and stay asleep  Advised trial of Benadryl  Was in ER last weekend w n/v  Got fluids and felt much better  No cramping or ctxns, no vb/lof  GBS n/v    Pt taking Fe Qd

## 2018-03-07 NOTE — PROGRESS NOTES
Education  Baby & Me Education 1st Trimester:   First Trimester Education provided:   benefits of breastfeeding, importance of exclusive breastfeeding, early initiation of breastfeeding, exclusive breastfeeding for the first 6 months and Pregnancy Essentials Reference Guide given   The patient is not planning on breastfeeding  Prenatal education provided by: Gracy Soriano PA-C     Baby & Me Education 2nd Trimester:   Second Trimester Education provided:   benefits of breastfeeding, importance of exclusive breastfeeding, early initiation of breastfeeding, exclusive breastfeeding for the first 6 months, non-pharmacologic pain relief methods for labor, rooming-in on 24 hour basis and Pregnancy Essentials Reference Guide given  Mother has not registered for prenatal class  Thought has not been given to selecting a pediatrician  The mother has not discussed personal preferences with her provider     Prenatal education provided by: Gracy SHANE      Signatures   Electronically signed by : Gracy Soriano, [facility]; Oct 31 2017  2:46PM EST                       (Author)

## 2018-03-16 ENCOUNTER — ROUTINE PRENATAL (OUTPATIENT)
Dept: OBGYN CLINIC | Facility: CLINIC | Age: 23
End: 2018-03-16
Payer: COMMERCIAL

## 2018-03-16 VITALS — WEIGHT: 177 LBS | BODY MASS INDEX: 29.45 KG/M2 | SYSTOLIC BLOOD PRESSURE: 104 MMHG | DIASTOLIC BLOOD PRESSURE: 70 MMHG

## 2018-03-16 DIAGNOSIS — Z34.83 MULTIGRAVIDA IN THIRD TRIMESTER: ICD-10-CM

## 2018-03-16 DIAGNOSIS — Z3A.35 35 WEEKS GESTATION OF PREGNANCY: Primary | ICD-10-CM

## 2018-03-16 PROCEDURE — 99213 OFFICE O/P EST LOW 20 MIN: CPT | Performed by: OBSTETRICS & GYNECOLOGY

## 2018-03-18 LAB — GP B STREP DNA SPEC QL NAA+PROBE: POSITIVE

## 2018-03-23 ENCOUNTER — ROUTINE PRENATAL (OUTPATIENT)
Dept: OBGYN CLINIC | Facility: CLINIC | Age: 23
End: 2018-03-23
Payer: COMMERCIAL

## 2018-03-23 VITALS — SYSTOLIC BLOOD PRESSURE: 130 MMHG | WEIGHT: 178 LBS | DIASTOLIC BLOOD PRESSURE: 74 MMHG | BODY MASS INDEX: 29.62 KG/M2

## 2018-03-23 DIAGNOSIS — Z3A.36 36 WEEKS GESTATION OF PREGNANCY: Primary | ICD-10-CM

## 2018-03-23 PROCEDURE — 99213 OFFICE O/P EST LOW 20 MIN: CPT | Performed by: OBSTETRICS & GYNECOLOGY

## 2018-03-23 NOTE — PROGRESS NOTES
Labor Talk done considering epidural, reviewed signs of labor, checking in visit done, how to call, reviewed gbs pos  Patient reports good fm, no n/v,  bleeding, loss of fluid, edema, dom violence, or smoking  rhianna pnv, some headache a lot of pressure and some cramping off and on

## 2018-03-30 ENCOUNTER — ROUTINE PRENATAL (OUTPATIENT)
Dept: OBGYN CLINIC | Facility: CLINIC | Age: 23
End: 2018-03-30
Payer: COMMERCIAL

## 2018-03-30 VITALS
BODY MASS INDEX: 29.49 KG/M2 | WEIGHT: 177 LBS | HEIGHT: 65 IN | SYSTOLIC BLOOD PRESSURE: 118 MMHG | DIASTOLIC BLOOD PRESSURE: 80 MMHG

## 2018-03-30 DIAGNOSIS — Z34.90 ENCOUNTER FOR SUPERVISION OF NORMAL PREGNANCY, ANTEPARTUM, UNSPECIFIED GRAVIDITY: Primary | ICD-10-CM

## 2018-03-30 PROBLEM — Z3A.35 35 WEEKS GESTATION OF PREGNANCY: Status: RESOLVED | Noted: 2018-03-16 | Resolved: 2018-03-30

## 2018-03-30 PROCEDURE — 99213 OFFICE O/P EST LOW 20 MIN: CPT | Performed by: PHYSICIAN ASSISTANT

## 2018-03-30 NOTE — PROGRESS NOTES
VISIT: (+) Cramping - last night strong contractions every 5 min for about 30 min and then stopped; Noticing more vaginal discharge; Denies n/v/HA//vb/lof/edema/dv/smoking  PNVs + DHA - tolerating daily  Good FM - r/angélica 10 kicks/2 hrs   Tdap - not done and patient desires - encourage to get ASAP at this time    No change in cervix  Reviewed signs and symptoms of labor and when to call; Reviewed signs and symptoms of pregnancy induced hypertension or preeclampsia and when to call  RTO in 1 weeks for routine ob check or sooner if needed

## 2018-04-06 ENCOUNTER — ROUTINE PRENATAL (OUTPATIENT)
Dept: OBGYN CLINIC | Facility: CLINIC | Age: 23
End: 2018-04-06

## 2018-04-06 VITALS
DIASTOLIC BLOOD PRESSURE: 72 MMHG | BODY MASS INDEX: 30.49 KG/M2 | HEIGHT: 65 IN | WEIGHT: 183 LBS | SYSTOLIC BLOOD PRESSURE: 122 MMHG

## 2018-04-06 DIAGNOSIS — Z34.90 ENCOUNTER FOR SUPERVISION OF NORMAL PREGNANCY, ANTEPARTUM, UNSPECIFIED GRAVIDITY: Primary | ICD-10-CM

## 2018-04-06 PROCEDURE — PNV: Performed by: PHYSICIAN ASSISTANT

## 2018-04-13 ENCOUNTER — ROUTINE PRENATAL (OUTPATIENT)
Dept: OBGYN CLINIC | Facility: CLINIC | Age: 23
End: 2018-04-13
Payer: COMMERCIAL

## 2018-04-13 VITALS — WEIGHT: 187 LBS | DIASTOLIC BLOOD PRESSURE: 78 MMHG | BODY MASS INDEX: 31.12 KG/M2 | SYSTOLIC BLOOD PRESSURE: 118 MMHG

## 2018-04-13 DIAGNOSIS — Z34.83 PRENATAL CARE, SUBSEQUENT PREGNANCY, THIRD TRIMESTER: Primary | ICD-10-CM

## 2018-04-13 PROCEDURE — 99213 OFFICE O/P EST LOW 20 MIN: CPT | Performed by: NURSE PRACTITIONER

## 2018-04-13 NOTE — PROGRESS NOTES
OFFICE VISIT: Denies any N/V, HA, Cramping, VB, LOF, Edema, Domestic Violence, Smoking  + FM  Tolerating PNV  RTO 1 week or sooner as needed

## 2018-04-19 ENCOUNTER — ROUTINE PRENATAL (OUTPATIENT)
Dept: OBGYN CLINIC | Facility: CLINIC | Age: 23
End: 2018-04-19
Payer: COMMERCIAL

## 2018-04-19 VITALS — DIASTOLIC BLOOD PRESSURE: 82 MMHG | BODY MASS INDEX: 31.45 KG/M2 | WEIGHT: 189 LBS | SYSTOLIC BLOOD PRESSURE: 122 MMHG

## 2018-04-19 DIAGNOSIS — Z34.03 ENCOUNTER FOR SUPERVISION OF NORMAL FIRST PREGNANCY IN THIRD TRIMESTER: Primary | ICD-10-CM

## 2018-04-19 PROCEDURE — 59025 FETAL NON-STRESS TEST: CPT | Performed by: NURSE PRACTITIONER

## 2018-04-19 PROCEDURE — 99213 OFFICE O/P EST LOW 20 MIN: CPT | Performed by: NURSE PRACTITIONER

## 2018-04-19 NOTE — PROGRESS NOTES
OFFICE VISIT: Pt presents today for routine prenatal visit  Pt presented to appointment with complaints of decreased fetal movement  Pt states she hasn't felt the baby move much over the last two days  States she will go hours without any movement then baby will move "like crazy" for a few hours then will not feel baby move again  Reviewed kick counts with patient and how to assess for fetal movement  Pt states occasional cramping, nothing regular or painful  Occasional headaches but has a history of migraines in the past  Denies any epigastric pain, edema, or visual changes  Labor precautions reviewed  Reviewed will look into induction at 41 weeks gestation or sooner if clinically indicated  RTO Monday for NST and ANURADHA

## 2018-04-23 ENCOUNTER — ROUTINE PRENATAL (OUTPATIENT)
Dept: OBGYN CLINIC | Facility: CLINIC | Age: 23
End: 2018-04-23
Payer: COMMERCIAL

## 2018-04-23 VITALS
WEIGHT: 186 LBS | SYSTOLIC BLOOD PRESSURE: 108 MMHG | HEART RATE: 72 BPM | BODY MASS INDEX: 30.99 KG/M2 | HEIGHT: 65 IN | DIASTOLIC BLOOD PRESSURE: 68 MMHG

## 2018-04-23 DIAGNOSIS — Z34.90 ENCOUNTER FOR SUPERVISION OF NORMAL PREGNANCY, ANTEPARTUM, UNSPECIFIED GRAVIDITY: Primary | ICD-10-CM

## 2018-04-23 PROCEDURE — 99213 OFFICE O/P EST LOW 20 MIN: CPT | Performed by: PHYSICIAN ASSISTANT

## 2018-04-23 NOTE — PROGRESS NOTES
VISIT: (+) Cramping - irregular tightening lower pelvis; (+) HA - coming and going and tolerating; (+) Edema - feet and ankles when on feet - resolves with rest; Denies n/v/vb/lof/dv/smoking  PNVs + DHA - tolerating daily  Good FM - r/angélica 10 kicks/2 hrs     NST - reactive with good accels and no decels; baseline 130s; toco - silent; ANURADHA - 10 cm with largest pocket of 5 cm  No change in cervix - 0/50/-1  Reviewed signs and symptoms of labor and when to call; Reviewed signs and symptoms of pregnancy induced hypertension or preeclampsia and when to call  IOL scheduled for Wed evening 8 pm

## 2018-04-25 ENCOUNTER — HOSPITAL ENCOUNTER (INPATIENT)
Facility: HOSPITAL | Age: 23
LOS: 3 days | Discharge: HOME | DRG: 560 | End: 2018-04-28
Attending: OBSTETRICS & GYNECOLOGY | Admitting: OBSTETRICS & GYNECOLOGY
Payer: COMMERCIAL

## 2018-04-25 ENCOUNTER — HOSPITAL ENCOUNTER (OUTPATIENT)
Dept: LABOR AND DELIVERY | Facility: HOSPITAL | Age: 23
Discharge: HOME | DRG: 560 | End: 2018-04-25
Payer: COMMERCIAL

## 2018-04-25 PROBLEM — O48.0 41 WEEKS GESTATION OF PREGNANCY: Status: ACTIVE | Noted: 2018-04-25

## 2018-04-25 PROBLEM — Z34.83 MULTIGRAVIDA IN THIRD TRIMESTER: Status: RESOLVED | Noted: 2018-03-03 | Resolved: 2018-04-25

## 2018-04-25 PROBLEM — O48.0 POST-TERM PREGNANCY, 40-42 WEEKS OF GESTATION: Status: ACTIVE | Noted: 2018-04-25

## 2018-04-25 PROBLEM — Z34.90 ENCOUNTER FOR INDUCTION OF LABOR: Status: ACTIVE | Noted: 2018-04-25

## 2018-04-25 PROBLEM — Z3A.41 41 WEEKS GESTATION OF PREGNANCY: Status: ACTIVE | Noted: 2018-04-25

## 2018-04-25 LAB
ABO GROUP BLD: NORMAL
AMPHETAMINES SERPL QL SCN: NEGATIVE
BARBITURATES UR QL: NEGATIVE
BASOPHILS # BLD AUTO: 0.01 THOUSANDS/ΜL (ref 0–0.1)
BASOPHILS NFR BLD AUTO: 0 % (ref 0–1)
BENZODIAZ UR QL: NEGATIVE
BLD GP AB SCN SERPL QL: NEGATIVE
COCAINE UR QL: NEGATIVE
EOSINOPHIL # BLD AUTO: 0.08 THOUSAND/ΜL (ref 0–0.61)
EOSINOPHIL NFR BLD AUTO: 1 % (ref 0–6)
ERYTHROCYTE [DISTWIDTH] IN BLOOD BY AUTOMATED COUNT: 14.2 % (ref 11.6–15.1)
HCT VFR BLD AUTO: 31.8 % (ref 34.8–46.1)
HGB BLD-MCNC: 10.2 G/DL (ref 11.5–15.4)
LYMPHOCYTES # BLD AUTO: 2.27 THOUSANDS/ΜL (ref 0.6–4.47)
LYMPHOCYTES NFR BLD AUTO: 19 % (ref 14–44)
MCH RBC QN AUTO: 28.8 PG (ref 26.8–34.3)
MCHC RBC AUTO-ENTMCNC: 32.1 G/DL (ref 31.4–37.4)
MCV RBC AUTO: 90 FL (ref 82–98)
METHADONE UR QL: NEGATIVE
MONOCYTES # BLD AUTO: 0.77 THOUSAND/ΜL (ref 0.17–1.22)
MONOCYTES NFR BLD AUTO: 7 % (ref 4–12)
NEUTROPHILS # BLD AUTO: 8.67 THOUSANDS/ΜL (ref 1.85–7.62)
NEUTS SEG NFR BLD AUTO: 73 % (ref 43–75)
NRBC BLD AUTO-RTO: 0 /100 WBCS
OPIATES UR QL SCN: NEGATIVE
PCP UR QL: NEGATIVE
PLATELET # BLD AUTO: 228 THOUSANDS/UL (ref 149–390)
PMV BLD AUTO: 11.3 FL (ref 8.9–12.7)
RBC # BLD AUTO: 3.54 MILLION/UL (ref 3.81–5.12)
RH BLD: POSITIVE
SPECIMEN EXPIRATION DATE: NORMAL
THC UR QL: NEGATIVE
WBC # BLD AUTO: 11.84 THOUSAND/UL (ref 4.31–10.16)

## 2018-04-25 PROCEDURE — 80307 DRUG TEST PRSMV CHEM ANLYZR: CPT | Performed by: OBSTETRICS & GYNECOLOGY

## 2018-04-25 PROCEDURE — 85025 COMPLETE CBC W/AUTO DIFF WBC: CPT | Performed by: OBSTETRICS & GYNECOLOGY

## 2018-04-25 PROCEDURE — 86901 BLOOD TYPING SEROLOGIC RH(D): CPT | Performed by: OBSTETRICS & GYNECOLOGY

## 2018-04-25 PROCEDURE — 86850 RBC ANTIBODY SCREEN: CPT | Performed by: OBSTETRICS & GYNECOLOGY

## 2018-04-25 PROCEDURE — 86900 BLOOD TYPING SEROLOGIC ABO: CPT | Performed by: OBSTETRICS & GYNECOLOGY

## 2018-04-25 PROCEDURE — 86592 SYPHILIS TEST NON-TREP QUAL: CPT | Performed by: OBSTETRICS & GYNECOLOGY

## 2018-04-25 RX ORDER — BUTORPHANOL TARTRATE 1 MG/ML
1 INJECTION, SOLUTION INTRAMUSCULAR; INTRAVENOUS
Status: DISCONTINUED | OUTPATIENT
Start: 2018-04-25 | End: 2018-04-26

## 2018-04-25 RX ORDER — ONDANSETRON 2 MG/ML
4 INJECTION INTRAMUSCULAR; INTRAVENOUS EVERY 6 HOURS PRN
Status: DISCONTINUED | OUTPATIENT
Start: 2018-04-25 | End: 2018-04-26

## 2018-04-25 RX ORDER — SODIUM CHLORIDE, SODIUM LACTATE, POTASSIUM CHLORIDE, CALCIUM CHLORIDE 600; 310; 30; 20 MG/100ML; MG/100ML; MG/100ML; MG/100ML
125 INJECTION, SOLUTION INTRAVENOUS CONTINUOUS
Status: DISCONTINUED | OUTPATIENT
Start: 2018-04-25 | End: 2018-04-26

## 2018-04-25 RX ADMIN — SODIUM CHLORIDE, SODIUM LACTATE, POTASSIUM CHLORIDE, AND CALCIUM CHLORIDE 125 ML/HR: .6; .31; .03; .02 INJECTION, SOLUTION INTRAVENOUS at 23:36

## 2018-04-25 RX ADMIN — BUTORPHANOL TARTRATE 1 MG: 1 INJECTION, SOLUTION INTRAMUSCULAR; INTRAVENOUS at 23:36

## 2018-04-25 NOTE — H&P
H&P Exam - Obstetrics- Family Medicine Resident  Dat Jon 25 y o  female MRN: [id number]  Unit/Bed#: LD Triage  Encounter: [id number]        ASSESSMENT:  Desiree Haines is a 25 y o , , 41w0d admitted for IOL for late-term, presented in early labor    PLAN:  1  Admit for induction of labor for late-term, patient appears in early labor, with contractions every 3 minutes  Cervical exam 2 days prior /-1, changed today /2   -no cervical ripening with cytotec    -if early labor becomes protracted, will initiate low dose pitocin  2  GBS: PCN administration with more active labor or spontaneous rupture  3  Fetal well being: cat 1 strip, 130s baseline, moderate variability, frequent accelerations  4  Routine labs: cbc, RPR, and type&screen pending  5  FEN: clear liquid diet, 125cc/hr LR  6  Analgesia upon maternal request  7  Hx of anxiety and depression with suicidal ideation in  - controlled without medications, no recent suicidal thoughts  8  Expectant management: predict vaginal delivery    D/w Dr Pan Dense    >2 Midnights  INPATIENT     History of Present Illness   Chief Complaint: induction of labor for post dates    HPI:  Desiree Haines is a 25 y o    female with an KHOI of 2018 who is being admitted for IOL for post term  Her current obstetrical history is uncomplicated  No pregnancy complications  Patient is GBS positive  Patient reports she lost her mucous plug this morning, and has been adán every 5 minutes since 4pm this evening  Reports some vaginal spotting  Feeling adequate fetal movement  Denies HA or blurry vision  Review of Systems   Constitutional: Negative for chills and fever  Eyes: Negative for visual disturbance  Respiratory: Negative for shortness of breath  Cardiovascular: Negative for chest pain  Gastrointestinal: Negative for abdominal pain  Genitourinary: Negative for frequency and vaginal pain     Neurological: Negative for headaches  Historical Information   OB History    Para Term  AB Living   2       1 0   SAB TAB Ectopic Multiple Live Births   1              # Outcome Date GA Lbr Thai/2nd Weight Sex Delivery Anes PTL Lv   2 Current            1 SAB 17                Baby complications/comments: none  Past Medical History:   Diagnosis Date    Anxiety     Depression     HX OF ANXIETY    Miscarriage     Varicella     IMMUNE     No past surgical history on file  Social History   History   Alcohol Use No     Comment: Social use per [employer]     History   Drug Use No     Comment: hx of Marijuana use, stopped w/positive UPT     History   Smoking Status    Former Smoker    Types: Cigarettes    Quit date:    Smokeless Tobacco    Never Used     Family History:   Family History   Problem Relation Age of Onset    Breast cancer Mother     Ovarian cancer Mother     Hypertension Father     Diabetes Maternal Aunt        Meds/Allergies   {  Prescriptions Prior to Admission   Medication    Ferrous Sulfate 134 MG TABS    Prenatal Vit-DSS-Fe Fum-FA (PRENATAL 19) tablet     No Known Allergies    Objective   Vitals: Last menstrual period 2017, unknown if currently breastfeeding  There is no height or weight on file to calculate BMI  Invasive Devices          No matching active lines, drains, or airways          PHYSICAL EXAM  General: No acute distress  Heart: Regular rate & rhythm  No murmurs/clicks/gallops  Lungs: Clear bilaterally  Normal effort  No wheezes/rales/rhonchi  ____  Abdominal: Soft  NT/ND  Gravid  Extremities: No TTP  Lower limbs symmetric bilaterally     SVE: 1/80/-2 posterior  FHR: Cat 1, 130s baseline, moderate variability, frequent accelerations  [place]: q 3 mins    Prenatal Labs:   Blood Type:   Lab Results   Component Value Date/Time    ABO Grouping A 2017 01:02 PM     , D (Rh type):   Lab Results   Component Value Date/Time    Rh Factor Positive 2017 01:02 PM , Antibody Screen:   Lab Results   Component Value Date/Time    Antibody Screen Negative 11/26/2017 01:02 PM    , HCT/HGB:   Lab Results   Component Value Date/Time    Hematocrit 31 1 (L) 02/25/2018 05:49 PM    Hemoglobin 10 2 (L) 02/25/2018 05:49 PM      , Platelets:   Lab Results   Component Value Date/Time    Platelets 599 88/86/7616 05:49 PM      , 1 hour Glucola:   Lab Results   Component Value Date/Time    Glucose 106 01/08/2018 11:18 AM       , Hep B:   Lab Results   Component Value Date/Time    External Hepatitis B Surface Ag NEG 09/12/2017     Lab Results   Component Value Date/Time    External HIV-1 Antibody NEG 09/12/2017     Lab Results   Component Value Date/Time    N gonorrhoeae, DNA Probe N  gonorrhoeae Amplified DNA Negative 09/07/2017 10:44 PM   Group B Strep:  Positive  Rubella:  Immune  Chlamydia: negative  RPR: negative      Signature/Title: Chioma Granda MD  Date: 4/25/2018  Time: 7:35 PM

## 2018-04-26 ENCOUNTER — ANESTHESIA (INPATIENT)
Dept: LABOR AND DELIVERY | Facility: HOSPITAL | Age: 23
DRG: 560 | End: 2018-04-26
Payer: COMMERCIAL

## 2018-04-26 LAB
BASE EXCESS BLDCOA CALC-SCNC: -4.3 MMOL/L (ref 3–11)
BASE EXCESS BLDCOV CALC-SCNC: -5.2 MMOL/L (ref 1–9)
HCO3 BLDCOA-SCNC: 23.7 MMOL/L (ref 17.3–27.3)
HCO3 BLDCOV-SCNC: 22.1 MMOL/L (ref 12.2–28.6)
O2 CT VFR BLDCOA CALC: 5.3 ML/DL
OXYHGB MFR BLDCOA: 24.4 %
OXYHGB MFR BLDCOV: 34.5 %
PCO2 BLDCOA: 54.8 MM[HG] (ref 30–60)
PCO2 BLDCOV: 49.4 MM HG (ref 27–43)
PH BLDCOA: 7.25 [PH] (ref 7.23–7.43)
PH BLDCOV: 7.27 [PH] (ref 7.19–7.49)
PO2 BLDCOA: 15.1 MM HG (ref 5–25)
PO2 BLDCOV: 18 MM HG (ref 15–45)
RPR SER QL: NORMAL
SAO2 % BLDCOV: 7.4 ML/DL

## 2018-04-26 PROCEDURE — 0HQ9XZZ REPAIR PERINEUM SKIN, EXTERNAL APPROACH: ICD-10-PCS | Performed by: OBSTETRICS & GYNECOLOGY

## 2018-04-26 PROCEDURE — 59409 OBSTETRICAL CARE: CPT | Performed by: OBSTETRICS & GYNECOLOGY

## 2018-04-26 PROCEDURE — 82805 BLOOD GASES W/O2 SATURATION: CPT | Performed by: OBSTETRICS & GYNECOLOGY

## 2018-04-26 RX ORDER — DOCUSATE SODIUM 100 MG/1
100 CAPSULE, LIQUID FILLED ORAL 2 TIMES DAILY
Status: DISCONTINUED | OUTPATIENT
Start: 2018-04-26 | End: 2018-04-28 | Stop reason: HOSPADM

## 2018-04-26 RX ORDER — IBUPROFEN 600 MG/1
600 TABLET ORAL EVERY 6 HOURS PRN
Status: DISCONTINUED | OUTPATIENT
Start: 2018-04-26 | End: 2018-04-27

## 2018-04-26 RX ORDER — ONDANSETRON 2 MG/ML
4 INJECTION INTRAMUSCULAR; INTRAVENOUS EVERY 8 HOURS PRN
Status: DISCONTINUED | OUTPATIENT
Start: 2018-04-26 | End: 2018-04-28 | Stop reason: HOSPADM

## 2018-04-26 RX ORDER — BUPIVACAINE HYDROCHLORIDE 2.5 MG/ML
INJECTION, SOLUTION INFILTRATION; PERINEURAL AS NEEDED
Status: DISCONTINUED | OUTPATIENT
Start: 2018-04-26 | End: 2018-04-26 | Stop reason: SURG

## 2018-04-26 RX ORDER — OXYCODONE HYDROCHLORIDE AND ACETAMINOPHEN 5; 325 MG/1; MG/1
1 TABLET ORAL EVERY 4 HOURS PRN
Status: DISCONTINUED | OUTPATIENT
Start: 2018-04-26 | End: 2018-04-28 | Stop reason: HOSPADM

## 2018-04-26 RX ORDER — OXYTOCIN/RINGER'S LACTATE 30/500 ML
1-30 PLASTIC BAG, INJECTION (ML) INTRAVENOUS
Status: DISCONTINUED | OUTPATIENT
Start: 2018-04-26 | End: 2018-04-26

## 2018-04-26 RX ORDER — DIPHENHYDRAMINE HCL 25 MG
25 TABLET ORAL EVERY 6 HOURS PRN
Status: DISCONTINUED | OUTPATIENT
Start: 2018-04-26 | End: 2018-04-28 | Stop reason: HOSPADM

## 2018-04-26 RX ORDER — FENTANYL CITRATE 50 UG/ML
INJECTION, SOLUTION INTRAMUSCULAR; INTRAVENOUS
Status: COMPLETED
Start: 2018-04-26 | End: 2018-04-26

## 2018-04-26 RX ORDER — OXYTOCIN/RINGER'S LACTATE 30/500 ML
250 PLASTIC BAG, INJECTION (ML) INTRAVENOUS CONTINUOUS
Status: DISCONTINUED | OUTPATIENT
Start: 2018-04-26 | End: 2018-04-28 | Stop reason: HOSPADM

## 2018-04-26 RX ORDER — OXYTOCIN/RINGER'S LACTATE 30/500 ML
PLASTIC BAG, INJECTION (ML) INTRAVENOUS
Status: DISPENSED
Start: 2018-04-26 | End: 2018-04-26

## 2018-04-26 RX ORDER — LIDOCAINE HYDROCHLORIDE AND EPINEPHRINE 15; 5 MG/ML; UG/ML
INJECTION, SOLUTION EPIDURAL AS NEEDED
Status: DISCONTINUED | OUTPATIENT
Start: 2018-04-26 | End: 2018-04-26 | Stop reason: SURG

## 2018-04-26 RX ORDER — TERBUTALINE SULFATE 1 MG/ML
INJECTION, SOLUTION SUBCUTANEOUS
Status: COMPLETED
Start: 2018-04-26 | End: 2018-04-26

## 2018-04-26 RX ORDER — FENTANYL CITRATE 50 UG/ML
INJECTION, SOLUTION INTRAMUSCULAR; INTRAVENOUS AS NEEDED
Status: DISCONTINUED | OUTPATIENT
Start: 2018-04-26 | End: 2018-04-26 | Stop reason: SURG

## 2018-04-26 RX ORDER — DIAPER,BRIEF,INFANT-TODD,DISP
1 EACH MISCELLANEOUS AS NEEDED
Status: DISCONTINUED | OUTPATIENT
Start: 2018-04-26 | End: 2018-04-28 | Stop reason: HOSPADM

## 2018-04-26 RX ORDER — CALCIUM CARBONATE 200(500)MG
1000 TABLET,CHEWABLE ORAL DAILY PRN
Status: DISCONTINUED | OUTPATIENT
Start: 2018-04-26 | End: 2018-04-28 | Stop reason: HOSPADM

## 2018-04-26 RX ORDER — BUTORPHANOL TARTRATE 1 MG/ML
1 INJECTION, SOLUTION INTRAMUSCULAR; INTRAVENOUS ONCE
Status: COMPLETED | OUTPATIENT
Start: 2018-04-26 | End: 2018-04-26

## 2018-04-26 RX ORDER — SODIUM CHLORIDE, SODIUM LACTATE, POTASSIUM CHLORIDE, CALCIUM CHLORIDE 600; 310; 30; 20 MG/100ML; MG/100ML; MG/100ML; MG/100ML
100 INJECTION, SOLUTION INTRAVENOUS CONTINUOUS
Status: DISCONTINUED | OUTPATIENT
Start: 2018-04-26 | End: 2018-04-26

## 2018-04-26 RX ORDER — OXYCODONE HYDROCHLORIDE AND ACETAMINOPHEN 5; 325 MG/1; MG/1
2 TABLET ORAL EVERY 4 HOURS PRN
Status: DISCONTINUED | OUTPATIENT
Start: 2018-04-26 | End: 2018-04-28 | Stop reason: HOSPADM

## 2018-04-26 RX ORDER — BUTORPHANOL TARTRATE 1 MG/ML
INJECTION, SOLUTION INTRAMUSCULAR; INTRAVENOUS
Status: COMPLETED
Start: 2018-04-26 | End: 2018-04-26

## 2018-04-26 RX ADMIN — SODIUM CHLORIDE, SODIUM LACTATE, POTASSIUM CHLORIDE, AND CALCIUM CHLORIDE 125 ML/HR: .6; .31; .03; .02 INJECTION, SOLUTION INTRAVENOUS at 04:32

## 2018-04-26 RX ADMIN — SODIUM CHLORIDE 2.5 MILLION UNITS: 9 INJECTION, SOLUTION INTRAVENOUS at 07:28

## 2018-04-26 RX ADMIN — SODIUM CHLORIDE, SODIUM LACTATE, POTASSIUM CHLORIDE, AND CALCIUM CHLORIDE 125 ML/HR: .6; .31; .03; .02 INJECTION, SOLUTION INTRAVENOUS at 05:56

## 2018-04-26 RX ADMIN — BUPIVACAINE HYDROCHLORIDE 3 ML: 2.5 INJECTION, SOLUTION INFILTRATION; PERINEURAL at 04:30

## 2018-04-26 RX ADMIN — BUTORPHANOL TARTRATE 1 MG: 1 INJECTION, SOLUTION INTRAMUSCULAR; INTRAVENOUS at 01:55

## 2018-04-26 RX ADMIN — Medication 1 TABLET: at 13:26

## 2018-04-26 RX ADMIN — DOCUSATE SODIUM 100 MG: 100 CAPSULE, LIQUID FILLED ORAL at 17:43

## 2018-04-26 RX ADMIN — TERBUTALINE SULFATE 0.25 MG: 1 INJECTION, SOLUTION SUBCUTANEOUS at 05:20

## 2018-04-26 RX ADMIN — BUPIVACAINE HYDROCHLORIDE 2 ML: 2.5 INJECTION, SOLUTION INFILTRATION; PERINEURAL at 04:33

## 2018-04-26 RX ADMIN — BENZOCAINE AND LEVOMENTHOL: 200; 5 SPRAY TOPICAL at 15:08

## 2018-04-26 RX ADMIN — Medication 2 MILLI-UNITS/MIN: at 07:05

## 2018-04-26 RX ADMIN — BUPIVACAINE HYDROCHLORIDE 3 ML: 2.5 INJECTION, SOLUTION INFILTRATION; PERINEURAL at 04:27

## 2018-04-26 RX ADMIN — WITCH HAZEL 1 PAD: 500 SOLUTION RECTAL; TOPICAL at 15:08

## 2018-04-26 RX ADMIN — FENTANYL CITRATE 25 MCG: 50 INJECTION, SOLUTION INTRAMUSCULAR; INTRAVENOUS at 04:30

## 2018-04-26 RX ADMIN — LIDOCAINE HYDROCHLORIDE AND EPINEPHRINE 2.5 ML: 15; 5 INJECTION, SOLUTION EPIDURAL at 04:39

## 2018-04-26 RX ADMIN — ROPIVACAINE HYDROCHLORIDE: 2 INJECTION, SOLUTION EPIDURAL; INFILTRATION at 09:12

## 2018-04-26 RX ADMIN — FENTANYL CITRATE 25 MCG: 50 INJECTION, SOLUTION INTRAMUSCULAR; INTRAVENOUS at 04:27

## 2018-04-26 RX ADMIN — Medication 2 MILLI-UNITS/MIN: at 00:19

## 2018-04-26 RX ADMIN — HYDROCORTISONE 1 APPLICATION: 1 CREAM TOPICAL at 15:08

## 2018-04-26 RX ADMIN — IBUPROFEN 600 MG: 600 TABLET ORAL at 23:29

## 2018-04-26 RX ADMIN — SODIUM CHLORIDE, SODIUM LACTATE, POTASSIUM CHLORIDE, AND CALCIUM CHLORIDE 100 ML/HR: .6; .31; .03; .02 INJECTION, SOLUTION INTRAVENOUS at 08:30

## 2018-04-26 RX ADMIN — LIDOCAINE HYDROCHLORIDE AND EPINEPHRINE 3 ML: 15; 5 INJECTION, SOLUTION EPIDURAL at 04:26

## 2018-04-26 RX ADMIN — SODIUM CHLORIDE 5 MILLION UNITS: 0.9 INJECTION, SOLUTION INTRAVENOUS at 03:34

## 2018-04-26 RX ADMIN — ROPIVACAINE HYDROCHLORIDE: 2 INJECTION, SOLUTION EPIDURAL; INFILTRATION at 04:40

## 2018-04-26 RX ADMIN — IBUPROFEN 600 MG: 600 TABLET ORAL at 16:13

## 2018-04-26 NOTE — DISCHARGE SUMMARY
Discharge Summary - Beatris Aschoff Raisner 25 y o  female MRN: [id number]    Unit/Bed#: -01 Encounter: [id number]    Admission Date: 2018     Discharge Date: 18    Admitting Diagnosis:   1  Pregnancy at 41w1d  2  Late-term gestation  3  Induced labor  4  GBS positive status s/p PCN, adequately treated   5  Hx of anxiety and depression with suicidal ideation in     Discharge Diagnosis: same, delivered    Procedures: spontaneous vaginal delivery, repair of 1° perineal & left periurethral lacerations    Attending: EDUARDA Valdes  Hospital Course:   Nayeli Jon is a 25 y o   at 41w1d wks who was initially admitted for induction of labor for late-term gestation  Pt was started on pitocin for induction  Pt experienced spontaneous rupture of membranes at  0230  Pt received an epidural for anesthesia  FHT demonstrated recurrent variable decelerations s/p epidural  Pt was internalized with IUPC and FSE  FHT returned to category I s/p interventions with amnioinfusion, maternal repositioning, oxygen IVFs, and terbutaline  Pitocin titration was started after continued Category I tracing  Pt progressed to complete dilation at 1059  She delivered a viable male  on 18 at 1134  Weight 7lbs 11oz via spontaneous vaginal delivery  Bedside nurse resuscitators evaluated the   Apgars were 9 (1 min) and 9 (5 min)  Patient tolerated the procedure well and recovered in the labor room with the  before being transferred to the postpartum floor in stable condition  Her postpartum course was uncomplicated  Pre-delivery hemoglobin was 10 2  Her postpartum pain was well controlled with oral analgesics  On day of discharge, she was ambulating and able to reasonably perform all ADLs  She was voiding and had appropriate bowel function  Pain was well controlled  She was discharged home on post-operative day #2 without complications   Patient was instructed to follow up with her OB as an outpatient and was given appropriate warnings to call provider if she develops signs of infection or uncontrolled pain  Complications: none apparent    Condition at discharge: good     Discharge instructions/Information to patient and family:   See after visit summary for information provided to patient and family  Provisions for Follow-Up Care:  See after visit summary for information related to follow-up care and any pertinent home health orders        Disposition: Home    Planned Readmission: No      Marie Dinh MD  OBGYN, PGY-1  4/28/2018 6:42 AM

## 2018-04-26 NOTE — PLAN OF CARE
DISCHARGE PLANNING     Discharge to home or other facility with appropriate resources Progressing        INFECTION - ADULT     Absence or prevention of progression during hospitalization Progressing     Absence of fever/infection during neutropenic period Progressing        Knowledge Deficit     Verbalizes understanding of labor plan Progressing     Patient/family/caregiver demonstrates understanding of disease process, treatment plan, medications, and discharge instructions Progressing        PAIN - ADULT     Verbalizes/displays adequate comfort level or baseline comfort level Progressing        POSTPARTUM     Experiences normal postpartum course Progressing     Appropriate maternal -  bonding Progressing     Establishment of infant feeding pattern Progressing     Incision(s), wounds(s) or drain site(s) healing without S/S of infection Progressing        SAFETY ADULT     Patient will remain free of falls Progressing     Maintain or return to baseline ADL function Progressing     Maintain or return mobility status to optimal level Progressing

## 2018-04-26 NOTE — ANESTHESIA PROCEDURE NOTES
Epidural Block    Patient location during procedure: OB  Start time: 4/26/2018 4:26 AM  Reason for block: procedure for pain and at surgeon's request  Staffing  Anesthesiologist: Sonya Montoya  Performed: anesthesiologist   Preanesthetic Checklist  Completed: patient identified, site marked, surgical consent, pre-op evaluation, timeout performed, IV checked, risks and benefits discussed and monitors and equipment checked  Epidural  Patient position: sitting  Prep: Betadine  Patient monitoring: heart rate, continuous pulse ox and frequent blood pressure checks  Approach: midline  Location: lumbar (1-5) (L5/S1)  Injection technique: SHANNAN saline  Needle  Needle type: Tuohy   Epidural needle gauge: 17 g  Catheter type: side hole  Catheter size: 19 g springwound  Catheter at skin depth: 12 5 cm  Test dose: negative and lidocaine 1 5% with epinephrine 1-to-200,000negative aspiration for CSF, negative aspiration for heme and no paresthesia on injection  patient tolerated the procedure well with no immediate complications  Additional Notes  Pt positioned sitting, timeout, sterile prep and drape  Placement x 1 attempt at L5/S1 with SHANNAN to NS  Attempted needle through needle CSE but no CSF  Cath threaded easily, negative aspiration and test, catheter dosed incrementally  Patient laid supine with THEA, PCEA initiated, + relief

## 2018-04-26 NOTE — PROGRESS NOTES
Patient Mora Steele at [facility] initiated at that time  Patient is now very uncomfortable and asking for an epidural  Will bring patient out to a room and get her comfortable with an epidural  Then reassess

## 2018-04-26 NOTE — OB LABOR/OXYTOCIN SAFETY PROGRESS
Oxytocin Safety Progress Check Note - Kevyn Jon 25 y o  female MRN: [id number]    Unit/Bed#: -01 Encounter: [id number]    Obstetric History       T0      L0     SAB1   TAB0   Ectopic0   Multiple0   Live Births0      Gestational Age: 41w1d  Dose (abhi-units/min) Oxytocin: 4 abhi-units/min  Contraction Frequency (minutes): 3-4  Contraction Quality: Mild  Tachysystole: No   Dilation: 7        Effacement (%): 100  Station: 0  Baseline Rate: 130 bpm  Fetal Heart Rate: 130 BPM  FHR Category: Category II          Notes/comments:      Occasional variable overall reassuring  Expectant management         Bharat Patel MD 2018 8:03 AM

## 2018-04-26 NOTE — PLAN OF CARE
Labor & Delivery     Manages discomfort Completed     Patient vital signs are stable Completed          DISCHARGE PLANNING     Discharge to home or other facility with appropriate resources Not Progressing        INFECTION - ADULT     Absence or prevention of progression during hospitalization Not Progressing     Absence of fever/infection during neutropenic period Not Progressing        Knowledge Deficit     Verbalizes understanding of labor plan Not Progressing     Patient/family/caregiver demonstrates understanding of disease process, treatment plan, medications, and discharge instructions Not Progressing        PAIN - ADULT     Verbalizes/displays adequate comfort level or baseline comfort level Not Progressing        POSTPARTUM     Experiences normal postpartum course Not Progressing     Appropriate maternal -  bonding Not Progressing     Establishment of infant feeding pattern Not Progressing     Incision(s), wounds(s) or drain site(s) healing without S/S of infection Not Progressing        SAFETY ADULT     Patient will remain free of falls Not Progressing     Maintain or return to baseline ADL function Not Progressing     Maintain or return mobility status to optimal level Not Progressing

## 2018-04-26 NOTE — PROGRESS NOTES
FHT has recovered without decelerations for the past 40 minutes  Will start pitocin titration as contraction pattern has spaced out    D/w Dr Qasim Medina

## 2018-04-26 NOTE — L&D DELIVERY NOTE
DELIVERY NOTE  Dione Jon 25 y o  female MRN: [id number]  Unit/Bed#:  305-01 Encounter: [id number]    Obstetrician:   Dr Manjula Thomas    Assistant: Dr Maxine Powers    Pre-Delivery Diagnosis: Term pregnancy at 41w1d  Induced labor  Late term gestation  Single fetus  GBS positive  Hx of anxiety and depression with suicidal ideations    Post-Delivery Diagnosis: Same as above - Delivered  1st degree laceration  Left Periurethral tear    Procedure: Spontaneous vaginal delivery  Repair of 1st degree and left periurethral spontaneous lacerations      Indications for instrumental delivery: None    Estimated Blood Loss:  250mL    Cord Blood Gases:   Arterial pH:7 254, BE: -4 3  Venous pH: 7 268, BE: -5 2    Complications:  None    Description of Delivery:   Rose Brandon is a 25 y o   at 41w1d wks who was initially admitted for induction of labor for late-term gestation  Pt was started on pitocin for induction  Pt experienced spontaneous rupture of membranes at  0230  Pt received an epidural for anesthesia  FHT demonstrated recurrent variable decelerations s/p epidural  Pt was internalized with IUPC and FSE  FHT returned to category I s/p interventions with amnioinfusion, maternal repositioning, oxygen IVFs, and terbutaline  Pitocin titration was started after continued Category I tracing  Pt progressed to complete dilation at 1059  After pushing for 25 mins, patient delivered a viable Male  over 1st degree and left periurethral lacerations at 1134, APGARs 9/9, weight pending  A nuchal cord was no noted  With the assistance of maternal expulsive efforts and downward traction of fetal head, the anterior shoulder was delivered without difficulty, followed by the remainder of the infant's body  The  was placed on the mother's chest and provided routine care by the  staff  There was delayed clamping of the umbilical cord, after which, was doubly clamped and cut   Umbilical cord blood and umbilical artery and venous gases were collected  Placenta was delivered with fundal massage and gentle traction on the cord with active management of the third stage of labor  Placenta delivered intact with a 3-vessel cord at 1137  Active management of the third stage of labor was undertaken with IV pitocin  Bimanual exam was performed to evacuate clots and uterus noted to be firm and contracted down well  Bleeding was noted to be under control  Inspection of the perineum, vagina, labia, and urethra revealed 1st degree and left periurethral lacerations which were then repaired in standard fashion with 3-0 Vicryl rapid  The lacerations showed good tissue reapproximation and hemostasis  Mother and baby are currently recovering nicely in stable condition  Dr Arturo June was present and participated in the entire delivery      Concha Lovett MD  [facility], PGY-1  4/26/2018 12:07 PM

## 2018-04-26 NOTE — ANESTHESIA PREPROCEDURE EVALUATION
26 yo  at 41 weeks undergoing IOL, requesting labor analgesia  Review of Systems/Medical History  Patient summary reviewed  Chart reviewed  No history of anesthetic complications (none prior)     Cardiovascular  Negative cardio ROS    Pulmonary  Negative pulmonary ROS        GI/Hepatic  Negative GI/hepatic ROS          Negative  ROS        Endo/Other    Obesity (BMI 31)    GYN  Currently pregnant , Prior pregnancy/OB history : 2 Parity: 0,          Hematology  Negative hematology ROS      Musculoskeletal  Negative musculoskeletal ROS        Neurology  Negative neurology ROS      Psychology   Anxiety, Depression ,            Physical Exam    Airway    Mallampati score: I  TM Distance: >3 FB  Neck ROM: full     Dental   Comment: Tongue piercing removed, No notable dental hx     Cardiovascular  Comment: Negative ROS,     Pulmonary      Other Findings      Lab Results   Component Value Date    HGB 10 2 (L) 2018     2018     Anesthesia Plan  ASA Score- 2     Anesthesia Type- epidural and spinal with ASA Monitors  Additional Monitors:   Airway Plan:         Plan Factors-    Induction- intravenous  Postoperative Plan-     Informed Consent- Anesthetic plan and risks discussed with patient

## 2018-04-26 NOTE — DISCHARGE INSTRUCTIONS
Vaginal Delivery   WHAT YOU SHOULD KNOW:   A vaginal delivery is the birth of your baby through your vagina (birth canal)  AFTER YOU LEAVE:   Medicines:  · NSAIDs  help decrease swelling and pain or fever  This medicine is available with or without a doctor's order  NSAIDs can cause stomach bleeding or kidney problems in certain people  If you take blood thinner medicine, always ask your healthcare provider if NSAIDs are safe for you  Always read the medicine label and follow directions  · Take your medicine as directed  Call your healthcare provider if you think your medicine is not helping or if you have side effects  Tell him if you are allergic to any medicine  Keep a list of the medicines, vitamins, and herbs you take  Include the amounts, and when and why you take them  Bring the list or the pill bottles to follow-up visits  Carry your medicine list with you in case of an emergency  Follow up with your primary healthcare provider:  Most women need to return 6 weeks after a vaginal delivery  Ask about how to care for your wounds or stitches  Write down your questions so you remember to ask them during your visits  Activity:  Rest as much as possible  Try to keep all activities short  You may be able to do some exercise soon after you have your baby  Talk with your primary healthcare provider before you start exercising  If you work outside the home, ask when you can return to your job  Kegel exercises:  Kegel exercises may help your vaginal and rectal muscles heal faster  You can do Kegel exercises by tightening and relaxing the muscles around your vagina  Kegel exercises help make the muscles stronger  Breast care:  When your milk comes in, your breasts may feel full and hard  Ask how to care for your breasts, even if you are not breastfeeding  Constipation:  Do not try to push the bowel movement out if it is too hard   High-fiber foods, extra liquids, and regular exercise can help you prevent constipation  Examples of high-fiber foods are fruit and bran  Prune juice and water are good liquids to drink  Regular exercise helps your digestive system work  You may also be told to take over-the-counter fiber and stool softener medicines  Take these items as directed  Hemorrhoids:  Pregnancy can cause severe hemorrhoids  You may have rectal pain because of the hemorrhoids  Ask how to prevent or treat hemorrhoids  Perineum care: Your perineum is the area between your vagina and anus  Keep the area clean and dry to help it heal and to prevent infection  Wash the area gently with soap and water when you bathe or shower  Rinse your perineum with warm water when you use the toilet  Your primary healthcare provider may suggest you use a warm sitz bath to help decrease pain  A sitz bath is a bathtub or basin filled to hip level  Stay in the sitz bath for 20 to 30 minutes, or as directed  Vaginal discharge: You will have vaginal discharge, called lochia, after your delivery  The lochia is bright red the first day or two after the birth  By the fourth day, the amount decreases, and it turns red-brown  Use a sanitary pad rather than a tampon to prevent a vaginal infection  It is normal to have lochia up to 8 weeks after your baby is born  Monthly periods: Your period may start again within 7 to 12 weeks after your baby is born  If you are breastfeeding, it may take longer for your period to start again  You can still get pregnant again even though you do not have your monthly period  Talk with your primary healthcare provider about a birth control method that will be good for you if you do not want to get pregnant  Mood changes: Many new mothers have some kind of mood changes after delivery  Some of these changes occur because of lack of sleep, hormone changes, and caring for a new baby  Some mood changes can be more serious, such as postpartum depression   Talk with your primary healthcare provider if you feel unable to care for yourself or your baby  Sexual activity:  You may need to avoid sex for 6 to 7 weeks after you have your baby  You may notice you have a decreased desire for sex, or sex may be painful  You may need to use a vaginal lubricant (gel) to help make sex more comfortable  Contact your primary healthcare provider if:   · You have heavy vaginal bleeding that fills 1 or more sanitary pads in 1 hour  · You have a fever  · Your pain does not go away, or gets worse  · The skin between your vagina and rectum is swollen, warm, or red  · You have swollen, hard, or painful breasts  · You feel very sad or depressed  · You feel more tired than usual      · You have questions or concerns about your condition or care  Seek care immediately or call 911 if:   · You have pus or yellow drainage coming from your vagina or wound  · You are urinating very little, or not at all  · Your arm or leg feels warm, tender, and painful  It may look swollen and red  · You feel lightheaded, have sudden and worsening chest pain, or trouble breathing  You may have more pain when you take deep breaths or cough, or you may cough up blood  © 2014 [address] is for End User's use only and may not be sold, redistributed or otherwise used for commercial purposes  All illustrations and images included in CareNotes® are the copyrighted property of A D A M [employer] Inc  or Karthik Downey  The above information is an  only  It is not intended as medical advice for individual conditions or treatments  Talk to your doctor, nurse or pharmacist before following any medical regimen to see if it is safe and effective for you  Postpartum Perineal Care   WHAT YOU NEED TO KNOW:   Postpartum perineal care is care for your perineum after you have a baby  The perineum is your vagina and anus     DISCHARGE INSTRUCTIONS:   Care for your perineum:  Healthcare providers will give you a small squirt bottle and show you how to use it  Do the following after you use the toilet and before you put on a new pad:  · Remove the soiled pad    · Use the squirt bottle to rinse your perineum from front to back while you sit on the toilet     · Pat the area dry from front to back with toilet paper or a cotton cloth     · Put on a fresh pad    · Wash your hands  Decrease pain:  Ask your healthcare provider about these and other ways to decrease perineal pain:  · Sitz baths:  Healthcare providers may give you a portable sitz bath  This is a small tub that fits in the toilet  Fill the sitz bath or bathtub with 4 to 6 inches of warm water  Sit in the warm water for 20 minutes 2 to 3 times a day  · Ice:  Ice helps decrease swelling and pain  Ice may also help prevent tissue damage  Use an ice pack, or put crushed ice in a plastic bag  Cover it with a towel and place it on your perineum for 15 to 20 minutes every hour, or as directed  · Medicine spray, wipes, or pads:  Healthcare providers may give you a medicine spray or wipes soaked with numbing medicine to decrease the pain  Pads that contain an herb called witch hazel may also help reduce pain  Use these after perineal care or a sitz bath  Follow up with your healthcare provider as directed:  Write down your questions so you remember to ask them during your visits  Contact your healthcare provider if:   · You have heavy vaginal bleeding that fills 1 or more sanitary pads in 1 hour  · You have foul-smelling vaginal discharge  · You feel weak or lightheaded  · You have questions or concerns about your condition or care  Seek care immediately or call 911 if:   · You have large blood clots or bright red blood coming from your vagina  · You have abdominal pain, vomiting, and a fever    © 2017 [address] Information is for End User's use only and may not be sold, redistributed or otherwise used for commercial purposes  All illustrations and images included in CareNotes® are the copyrighted property of A D A M , Inc  or Karthik Downey  The above information is an  only  It is not intended as medical advice for individual conditions or treatments  Talk to your doctor, nurse or pharmacist before following any medical regimen to see if it is safe and effective for you  Self Care After Delivery   AMBULATORY CARE:   The postpartum period  is the period of time from delivery to about 6 weeks  During this time you may experience many physical and emotional changes  It is important to understand what is normal and when you need to call your healthcare provider  It is also important to know how to care for yourself during this time  Call 911 for any of the following:   · You soak through 1 pad in 15 minutes, have blurry vision, clammy or pale skin, and feel faint  · You faint or lose consciousness  · Your heart is beating faster than normal      · You have trouble breathing  · You cough up blood  Seek care immediately if:   · You soak through 1 or more pads in an hour, or pass blood clots larger than a quarter from your vagina  · Your leg is painful, red, and larger than normal      · You have severe abdominal pain  · You have a bad headache or changes in your vision  · Your episiotomy or C section incision is red, swollen, bleeding, or draining pus  · Your incision comes apart  · You see or hear things that are not there, or have thoughts of harming yourself or your baby  Contact your obstetrician or midwife if:   · You have a fever  · You have new or worsening pain in your abdomen or vagina  · You continue to have the baby blues 10 days after you deliver  · You have trouble sleeping  · You have foul-smelling discharge from your vagina  · You have pain or burning when you urinate  · You do not have a bowel movement for 3 days or more  · You have nausea or are vomiting  · You have hard lumps or red streaks over your breasts  · You have cracked nipples or bleed from your nipples  · You have questions or concerns about your condition or care  Physical changes: The following are normal changes after you give birth:  · Pain in the area between your anus and vagina    · Breast pain    · Constipation or hemorrhoids    · Hot or cold flashes    · Vaginal bleeding or discharge    · Mild to moderate abdominal cramping    · Difficulty controlling bowel movements or urine  Emotional changes: The following are symptoms of the baby blues, or normal emotions after you give birth  The changes in your emotions may be caused by a drop in hormone levels after you deliver  If these symptoms last longer than 1 to 2 weeks after you give birth, you may have postpartum depression  · Feeling irritable    · Feeling sad    · Crying for no reason    · Feeling anxious  Breast care for nursing mothers: You may have sore breasts for 3 to 6 days after you give birth  This happens as your milk begins to fill your breasts  You may also have sore breasts if you do not breastfeed frequently  Do the following to care for your breasts:  · Apply a moist, warm, compress to your breast as directed  This may help soothe your breasts  Make sure the washcloth is not too hot before you apply it to your breast      · Nurse your baby or pump your milk frequently  This may prevent clogged milk ducts  Ask your healthcare provider how often to nurse or pump  · Massage your breasts as directed  This may help increase your milk flow  Gently rub your breasts in a circular motion before you breastfeed  You may need to gently squeeze your breast or nipple to help release milk  You can also use a breast pump to help release milk from your breast      · Wash your breasts with warm water only  Do not put soap on your nipples  Soap may cause your nipples to become dry  · Apply lanolin cream to your nipples as directed  Lanolin cream may add moisture to your skin and prevent nipple dryness  Always  wash off lanolin cream with warm water before you breastfeed  · Place pads in your bra  Your nipples may leak milk when you are not breastfeeding  You can place pads inside of your bra to help prevent leaking onto your clothing  Ask your healthcare provider where to purchase bra pads  · Get breastfeeding support if needed  There are healthcare providers who can answer questions about breastfeeding and provide you with support  Ask your healthcare provider who you can contact if you need breastfeeding support  Breast care for non-nursing mothers:  Milk will fill your breasts even if you bottle feed your baby  Do the following to help stop your milk from filling your breasts and causing pain:  · Wear a bra with support at all times  A sports bra or a tight-fitting bra will help stop your milk from coming in  · Apply ice on each breast for 15 to 20 minutes every hour or as directed  Use an ice pack, or put crushed ice in a plastic bag  Cover it with a towel  Ice helps your milk ducts shrink  · Keep your breasts away from warm water  Warm water will make it easier for milk to fill your breasts  Stand with your breasts away from warm water in the shower  · Limit how much you touch your breasts  This will prevent them from filling with milk  Perineum care: Your perineum is the area between your rectum and vagina  It is normal to have swelling and pain in this area after you give birth  If you had an episiotomy, your healthcare provider may give you special instructions  · Clean your perineum after you use the bathroom  This may prevent infection and help with healing  Use a spray bottle with warm water to clean your perineum  You may also gently spray warm water against your perineum when you urinate  Always wipe front to back       · Take a sitz bath as directed  A sitz bath may help relieve swelling and pain  Fill your bath tub or bucket with water up to your hips and sit in the water  Use cold water for 2 days after you deliver  Then use warm water  Ask your healthcare provider for more information about a sitz bath  · Apply ice packs for the first 24 hours or as directed  Use a plastic glove filled with ice or buy an ice pack  Wrap the ice pack or plastic glove in a small towel or wash cloth  Place the ice pack on your perineum for 20 minutes at a time  · Sit on a donut-shaped pillow  This may relieve pressure on your perineum when you sit  · Use wipes with medicine or take pills as directed  Your healthcare provider may tell you to use witch hazel pads  You can place witch hazel pads in the refrigerator before you apply them to your perineum  He may also tell you to take NSAIDs  Ask your healthcare provider how often to take pills or use wipes with medicine  · Do not go swimming or take tub baths for 4 to 6 weeks or as directed  This will help prevent an infection in your vagina or uterus  Bowel and bladder care: It may take 3 to 5 days to have a bowel movement after you deliver your baby  You can do the following to prevent or manage constipation, and get control of your bowel or bladder:  · Take stool softeners as directed  A stool softener is medicine that will make your bowel movements softer  This may prevent or relieve constipation  A stool softener may also make bowel movements less painful  · Drink plenty of liquids  Ask how much liquid to drink each day and which liquids are best for you  Liquids may help prevent constipation  · Eat foods high in fiber  Examples include fruits, vegetables, grains, beans, and lentils  Ask your healthcare provider how much fiber you need each day  Fiber may prevent constipation  · Do Kegel exercises as directed    Kegel exercises will help strengthen the muscles that control bowel movements and urination  Ask your healthcare provider for more information on Kegel exercises  · Apply cold compresses or medicine to hemorrhoids as directed  This may relieve swelling and pain  Your healthcare provider may tell you to apply ice or wipes with medicine to your hemorrhoids  He may also tell you to use a sitz bath  Ask your healthcare for more information on how to manage hemorrhoids  Nutrition:  Good nutrition is important in the postpartum period  It will help you return to a healthy weight, increase your energy levels, and prevent constipation  It will also help you get enough nutrients and calories if you are going to breastfeed your baby  · Eat a variety of healthy foods  Healthy foods include fruits, vegetables, whole-grain breads, low-fat dairy products, beans, lean meats, and fish  You may need 500 to 700 additional calories each day if you breastfeed your baby  You may also need extra protein  · Limit foods with added sugar and high amounts of fat  These foods are high in calories and low in healthy nutrients  Read food labels so you know how much sugar and fat is in the food you want to eat  · Drink 8 to 10 glasses of water per day  Water will help you make plenty of milk for your baby  It will also help prevent constipation  Drink a glass of water every time you breastfeed your baby  · Take vitamins as directed  Ask your healthcare provider what vitamins you need  · Limit caffeine and alcohol if you are breastfeeding  Caffeine and alcohol can get into your breast milk  Caffeine and alcohol can make your baby fussy  They can also interfere with your baby's sleep  Ask your healthcare provider if you can drink alcohol or caffeine  Rest and sleep: You may feel very tired in the postpartum period  Enough sleep will help you heal and give you energy to care for your baby  The following may help you get sleep and rest:  · Nap when your baby naps    Your baby may nap several times during the day  Get rest during this time  · Limit visitors  Many people may want to see you and your baby  Ask friends or family to visit on different days  This will give you time to rest      · Do not plan too much for one day  Put off household chores so that you have time to rest  Gradually do more each day  · Ask for help from family, friends, or neighbors  Ask them to help you with laundry, cleaning, or errands  Also ask someone to watch the baby while you take a nap or relax  Ask your partner to help with the care of your baby  Pump some of your breast milk so your partner can feed your baby during the night  Exercise after delivery:  Wait until your healthcare provider says it is okay to exercise  Exercise can help you lose weight, increase your energy levels, and manage your mood  It can also prevent constipation and blood clots  Start with gentle exercises such as walking  Do more as you have more energy  You may need to avoid abdominal exercises for 1 to 2 weeks after you deliver  Talk to your healthcare provider about an exercise plan that is right for you  Sexual activity after delivery:   · Do not have sex until your healthcare provider says it is okay  You may need to wait 4 to 6 weeks before you have sex  This may prevent infection and allow time to heal      · Your menstrual cycle may begin as soon as 3 weeks after you deliver  Your period may be delayed if you breastfeed your baby  You can become pregnant before you get your first postpartum period  Talk to your healthcare provider about birth control that is right for you  Some types of birth control are not safe during breastfeeding  For support and more information:  Join a support group for new mothers  Ask for help from family and friends with chores, errands, and care of your baby     · Office of Women's Health, [place] Department of Health and Human Services  [address], [address] [place], [place] [address] [facility], [address] [facility]  Jayde Con[address]  Phone: [phone]  Web Address: www womenshealth gov  · [employer] [facility] Postpartum [address] [facility] , [address]  [address] [facility] , [address] [place] [address]  Web Address: [email or web address]  Follow up with your obstetrician or midwife as directed: You will need to follow up with your healthcare provider in 2 to 6 weeks after delivery  Write down your questions so you remember to ask them at your visits  © 2017 [address] [facility] Information is for End User's use only and may not be sold, redistributed or otherwise used for commercial purposes  All illustrations and images included in CareNotes® are the copyrighted property of [employer]  or Karthik Downey  The above information is an  only  It is not intended as medical advice for individual conditions or treatments  Talk to your doctor, nurse or pharmacist before following any medical regimen to see if it is safe and effective for you

## 2018-04-26 NOTE — OB LABOR/OXYTOCIN SAFETY PROGRESS
Labor Progress Note - Helder Jon 25 y o  female MRN: [id number]    Unit/Bed#:  Triage  Encounter: [id number]    Obstetric History       T0      L0     SAB1   TAB0   Ectopic0   Multiple0   Live Births0      Gestational Age: 41w0d     Contraction Frequency (minutes): 2-3  Contraction Quality: Mild  Tachysystole: No   Dilation: 1        Effacement (%): 80  Station: -2  Baseline Rate: 125 bpm  Fetal Heart Rate: 143 BPM  FHR Category: Category I          Notes/comments:     Patient uncomfortable, in early labor  No cervical change, will begin low dose pitocin for induction    Stadol for discomfort as needed            Sonja Wilson MD 2018 11:49 PM

## 2018-04-26 NOTE — OB LABOR/OXYTOCIN SAFETY PROGRESS
Oxytocin Safety Progress Check Note - Viktoriya Zackpipe Jon 25 y o  female MRN: [id number]    Unit/Bed#:  Triage  Encounter: [id number]    Obstetric History       T0      L0     SAB1   TAB0   Ectopic0   Multiple0   Live Births0      Gestational Age: 41w1d  Dose (abhi-units/min) Oxytocin: 2 abhi-units/min  Contraction Frequency (minutes): 2-3  Contraction Quality: Mild  Tachysystole: No   Dilation: 1-2        Effacement (%): 80  Station: -1  Baseline Rate: 120 bpm  Fetal Heart Rate: 143 BPM  FHR Category: Category I          Notes/comments:     Patient is uncomfortable, SVE minimal change   Will continue LD pitocin and stadol as needed            Melita Vallecillo MD 2018 2:30 AM

## 2018-04-26 NOTE — ANESTHESIA POSTPROCEDURE EVALUATION
Post-Op Assessment Note      CV Status:  Stable    Mental Status:  Awake    Hydration Status:  Stable    PONV Controlled:  None    Airway Patency:  Patent    Post Op Vitals Reviewed: Yes          Staff: Anesthesiologist     Post-op block assessment: catheter intact and no complications        BP      Temp      Pulse     Resp      SpO2

## 2018-04-26 NOTE — OB LABOR/OXYTOCIN SAFETY PROGRESS
Labor Progress Note - Lynette Jon 25 y o  female MRN: [id number]    Unit/Bed#: -01 Encounter: [id number]    Obstetric History       T0      L0     SAB1   TAB0   Ectopic0   Multiple0   Live Births0      Gestational Age: 41w1d  Dose (abhi-units/min) Oxytocin: 0 abhi-units/min  Contraction Frequency (minutes): 2  Contraction Quality: Moderate  Tachysystole: No   Dilation: 4        Effacement (%): 90  Station: -1  Baseline Rate: 125 bpm  Fetal Heart Rate: 120 BPM  FHR Category: Category II          Notes/comments:     Patient is getting comfortable with epidural  Is laboring on her own  Is having early, variable, and intermittent late decelerations  Will internalize if needed  Patient making fast cervical change without medication              Meliza Sandoval MD 2018 5:04 AM

## 2018-04-26 NOTE — OB LABOR/OXYTOCIN SAFETY PROGRESS
Oxytocin Safety Progress Check Note - Viktoriya Zackpipe Smithjaimiemaya 25 y o  female MRN: [id number]    Unit/Bed#: -01 Encounter: [id number]    Obstetric History       T0      L0     SAB1   TAB0   Ectopic0   Multiple0   Live Births0      Gestational Age: 41w1d  Dose (abhi-units/min) Oxytocin: 2 abhi-units/min (Per Dr Daryn Espinosa)  Contraction Frequency (minutes): 2-3 5  Contraction Quality: Moderate  Tachysystole: No   Dilation: 8-9        Effacement (%): 100  Station: 1  Baseline Rate: 145 bpm  Fetal Heart Rate: 180 BPM  FHR Category: Category I          Notes/comments:   Fetal tachy cardia noted in 160s  Temperature 98 5  SVE as above  Pt continues to make cervical change  IVFs going  Fetal taachycardia has since resolved, FHT demonstating moderate variability  Pt nervous she won't have the energy to push when she is completely dilated   She is resting now    D/w Dr Musa Hatch MD 2018 10:05 AM

## 2018-04-27 PROCEDURE — 90715 TDAP VACCINE 7 YRS/> IM: CPT | Performed by: OBSTETRICS & GYNECOLOGY

## 2018-04-27 PROCEDURE — 99024 POSTOP FOLLOW-UP VISIT: CPT | Performed by: OBSTETRICS & GYNECOLOGY

## 2018-04-27 RX ORDER — ACETAMINOPHEN 325 MG/1
650 TABLET ORAL EVERY 6 HOURS PRN
Status: DISCONTINUED | OUTPATIENT
Start: 2018-04-27 | End: 2018-04-28 | Stop reason: HOSPADM

## 2018-04-27 RX ORDER — IBUPROFEN 600 MG/1
600 TABLET ORAL EVERY 6 HOURS PRN
Status: DISCONTINUED | OUTPATIENT
Start: 2018-04-27 | End: 2018-04-27

## 2018-04-27 RX ORDER — IBUPROFEN 600 MG/1
600 TABLET ORAL EVERY 6 HOURS PRN
Status: DISCONTINUED | OUTPATIENT
Start: 2018-04-27 | End: 2018-04-28 | Stop reason: HOSPADM

## 2018-04-27 RX ADMIN — Medication 1 TABLET: at 10:45

## 2018-04-27 RX ADMIN — TETANUS TOXOID, REDUCED DIPHTHERIA TOXOID AND ACELLULAR PERTUSSIS VACCINE, ADSORBED 0.5 ML: 5; 2.5; 8; 8; 2.5 SUSPENSION INTRAMUSCULAR at 10:47

## 2018-04-27 RX ADMIN — OXYCODONE HYDROCHLORIDE AND ACETAMINOPHEN 1 TABLET: 5; 325 TABLET ORAL at 13:19

## 2018-04-27 RX ADMIN — DOCUSATE SODIUM 100 MG: 100 CAPSULE, LIQUID FILLED ORAL at 09:01

## 2018-04-27 RX ADMIN — OXYCODONE HYDROCHLORIDE AND ACETAMINOPHEN 2 TABLET: 5; 325 TABLET ORAL at 22:03

## 2018-04-27 RX ADMIN — IBUPROFEN 600 MG: 600 TABLET ORAL at 09:31

## 2018-04-27 NOTE — SOCIAL WORK
Consults for hx THC and "Hx of anxiety and depression with suicidal ideations in 2015, controlled without medications at this time"  Per chart, mom and baby UDS negative  Spoke with pt ([phone]) who reports she is doing well and baby boy Ayda Hazel is 1st kid for her and FOB/SO Holger John ([phone]) who is involved and supportive  Pt reports she lives in [facility] with her mom, mom's fiance, and his mother who are all very supportive  Pt reports FOB lives about 20 min away  Pt reports having good support system, all baby supplies needed, will bottle feed, has WIC, is on maternity leave from work, has cars for transportation as needed, and ped is [address] [facility]  Pt confirmed hx depression with SI in 2015 and reports she did receive treatment with meds and counseling and it has since resolved  Pt reports no current treatment and denies any current SI/HI or any symptoms or concerns  Pt aware of resources to follow up and aware she can contact her OBGYN as well if needed  Pt also confirmed hx THC socially and reports last use was over a year ago before the pregnancy  Pt denies any other drug use and pt denies any involvement with C&[facility] or [facility]  Pt denies any CM needs  No needs noted for d/c home

## 2018-04-27 NOTE — PLAN OF CARE
DISCHARGE PLANNING     Discharge to home or other facility with appropriate resources Progressing        INFECTION - ADULT     Absence or prevention of progression during hospitalization Progressing     Absence of fever/infection during neutropenic period Progressing        Knowledge Deficit     Verbalizes understanding of labor plan Progressing     Patient/family/caregiver demonstrates understanding of disease process, treatment plan, medications, and discharge instructions Progressing        PAIN - ADULT     Verbalizes/displays adequate comfort level or baseline comfort level Progressing        POSTPARTUM     Experiences normal postpartum course Progressing     Appropriate maternal -  bonding Progressing     Establishment of infant feeding pattern Progressing     Incision(s), wounds(s) or drain site(s) healing without S/S of infection Progressing        Potential for Falls     Patient will remain free of falls Progressing        SAFETY ADULT     Patient will remain free of falls Progressing     Maintain or return to baseline ADL function Progressing     Maintain or return mobility status to optimal level Progressing

## 2018-04-27 NOTE — PROGRESS NOTES
Progress Note - OB/GYN   Martha Jon 25 y o  female MRN: [id number]  Unit/Bed#: [facility] 858-01 Encounter: [id number]    Assessment:  25 y o  [id number] s/p Spontaneous Vaginal Delivery Postpartum day  1  Anxiety & Depression    Plan:  1  Routine post-partum care  2  Anxiety & Depression with hx of SI 2015   -CM consult   3  Anticipate discharge tomorrow    Subjective/Objective   Chief Complaint:       Subjective:  Cydney Hodge is well appearing and has no complaints at this time  Pain: Well controlled with pain medication regimen  Tolerating PO: yes  Voiding: yes  Flatus: yes  BM: no  Ambulating: yes  Breastfeeding: no  Chest pain: no  Shortness of breath: no  Leg pain: no  Lochia: appropriate    Objective:     Vitals: Blood pressure 98/62, pulse 68, temperature 97 8 °F (36 6 °C), temperature source Oral, resp  rate 16, height 5' 5" (1 651 m), weight 86 2 kg (190 lb), last menstrual period 07/17/2017, SpO2 97 %, not currently breastfeeding      Intake/Output Summary (Last 24 hours) at 04/27/18 0616  Last data filed at 04/26/18 1755   Gross per 24 hour   Intake              950 ml   Output             3390 ml   Net            -2440 ml       Physical Exam:     General: NAD  Lungs: non-labored breathing, normal effort   Abdomen: Soft, no distension/rebound/guarding/tenderness   Fundus: Firm, non-tender, fundus: -2 cm below the umbilicus   Incision: none  Lower Extremities: Non-tender      Lab, Imaging and other studies:     Recent Results (from the past 72 hour(s))   Type and screen    Collection Time: 04/25/18  9:04 PM   Result Value Ref Range    ABO Grouping A     Rh Factor Positive     Antibody Screen Negative     Specimen Expiration Date [id number]    CBC and differential    Collection Time: 04/25/18  9:04 PM   Result Value Ref Range    WBC 11 84 (H) 4 31 - 10 16 Thousand/uL    RBC 3 54 (L) 3 81 - 5 12 Million/uL    Hemoglobin 10 2 (L) 11 5 - 15 4 g/dL    Hematocrit 31 8 (L) 34 8 - 46 1 %    MCV 90 82 - 98 fL    MCH 28 8 26 8 - 34 3 pg    MCHC 32 1 31 4 - 37 4 g/dL    RDW 14 2 11 6 - 15 1 %    MPV 11 3 8 9 - 12 7 fL    Platelets 354 511 - 438 Thousands/uL    nRBC 0 /100 WBCs    Neutrophils Relative 73 43 - 75 %    Lymphocytes Relative 19 14 - 44 %    Monocytes Relative 7 4 - 12 %    Eosinophils Relative 1 0 - 6 %    Basophils Relative 0 0 - 1 %    Neutrophils Absolute 8 67 (H) 1 85 - 7 62 Thousands/µL    Lymphocytes Absolute 2 27 0 60 - 4 47 Thousands/µL    Monocytes Absolute 0 77 0 17 - 1 22 Thousand/µL    Eosinophils Absolute 0 08 0 00 - 0 61 Thousand/µL    Basophils Absolute 0 01 0 00 - 0 10 Thousands/µL   RPR    Collection Time: 04/25/18  9:04 PM   Result Value Ref Range    RPR Non-Reactive Non-Reactive   Rapid drug screen, urine    Collection Time: 04/25/18  9:04 PM   Result Value Ref Range    Amph/Meth UR Negative Negative    Barbiturate Ur Negative Negative    Benzodiazepine Urine Negative Negative    Cocaine Urine Negative Negative    Methadone Urine Negative Negative    Opiate Urine Negative Negative    PCP Ur Negative Negative    THC Urine Negative Negative   Blood gas, arterial, cord    Collection Time: 04/26/18 11:37 AM   Result Value Ref Range    pH, Cord Art 7 254 7 230 - 7 430    pCO2, Cord Art 54 8 30 0 - 60 0    pO2, Cord Art 15 1 5 0 - 25 0 mm HG    HCO3, Cord Art 23 7 17 3 - 27 3 mmol/L    Base Exc, Cord Art -4 3 (L) 3 0 - 11 0 mmol/L    O2 Content, Cord Art 5 3 ml/dl    O2 Hgb, Arterial Cord 24 4 %   Blood gas, venous, cord    Collection Time: 04/26/18 11:37 AM   Result Value Ref Range    pH, Cord Darrell 7 268 7 190 - 7 490    pCO2, Cord Darrell 49 4 (H) 27 0 - 43 0 mm HG    pO2, Cord Darrell 18 0 15 0 - 45 0 mm HG    HCO3, Cord Darrell 22 1 12 2 - 28 6 mmol/L    Base Exc, Cord Darrell -5 2 (L) 1 0 - 9 0 mmol/L    O2 Cont, Cord Darrell 7 4 mL/dL    O2 HGB,VENOUS CORD 34 5 %     Meds:    docusate sodium 100 mg Oral BID   prenatal multivitamin 1 tablet Oral Daily   tetanus-diphtheria-acellular pertussis 0 5 mL Intramuscular Once       benzocaine-menthol-lanolin-aloe  4x Daily PRN   calcium carbonate 1,000 mg Daily PRN   diphenhydrAMINE 25 mg Q6H PRN   hydrocortisone 1 application PRN   ibuprofen 600 mg Q6H PRN   ondansetron 4 mg Q8H PRN   oxyCODONE-acetaminophen 1 tablet Q4H PRN   oxyCODONE-acetaminophen 2 tablet Q4H PRN   witch hazel-glycerin 1 pad PRN             Signature / Title: Louis Wade MD, Ob/Gyn, PGY-2  Date: 4/27/2018  Time: 6:16 AM

## 2018-04-28 VITALS
WEIGHT: 190 LBS | RESPIRATION RATE: 20 BRPM | BODY MASS INDEX: 31.65 KG/M2 | DIASTOLIC BLOOD PRESSURE: 66 MMHG | HEART RATE: 72 BPM | HEIGHT: 65 IN | OXYGEN SATURATION: 98 % | SYSTOLIC BLOOD PRESSURE: 107 MMHG | TEMPERATURE: 98.2 F

## 2018-04-28 PROCEDURE — 99024 POSTOP FOLLOW-UP VISIT: CPT | Performed by: OBSTETRICS & GYNECOLOGY

## 2018-04-28 RX ORDER — ACETAMINOPHEN 325 MG/1
650 TABLET ORAL EVERY 6 HOURS PRN
Qty: 30 TABLET | Refills: 0
Start: 2018-04-28 | End: 2018-08-02 | Stop reason: ALTCHOICE

## 2018-04-28 RX ORDER — DOCUSATE SODIUM 100 MG/1
100 CAPSULE, LIQUID FILLED ORAL 2 TIMES DAILY
Qty: 10 CAPSULE | Refills: 0
Start: 2018-04-28 | End: 2018-08-02 | Stop reason: ALTCHOICE

## 2018-04-28 RX ORDER — DIAPER,BRIEF,INFANT-TODD,DISP
1 EACH MISCELLANEOUS AS NEEDED
Qty: 30 G | Refills: 0
Start: 2018-04-28 | End: 2018-08-02 | Stop reason: ALTCHOICE

## 2018-04-28 RX ORDER — IBUPROFEN 600 MG/1
600 TABLET ORAL EVERY 6 HOURS PRN
Qty: 30 TABLET | Refills: 0
Start: 2018-04-28 | End: 2018-08-02 | Stop reason: ALTCHOICE

## 2018-04-28 RX ADMIN — Medication 1 TABLET: at 08:09

## 2018-04-28 RX ADMIN — IBUPROFEN 600 MG: 600 TABLET ORAL at 08:09

## 2018-04-28 RX ADMIN — DOCUSATE SODIUM 100 MG: 100 CAPSULE, LIQUID FILLED ORAL at 08:09

## 2018-04-28 NOTE — PROGRESS NOTES
Progress Note - OB/GYN   Kevyn Mel Jon 25 y o  female MRN: [id number]  Unit/Bed#: [facility] 858-01 Encounter: [id number]    Assessment:  25 y o  [id number] s/p Spontaneous Vaginal Delivery Postpartum day  2  Anxiety & Depression    Plan:  1  Routine post-partum care  2  Anxiety & Depression with hx of SI 2015   -seen by CM, no needs identified  3  D/c home today    Subjective/Objective   Chief Complaint:       Subjective:  Viviane Nation is well appearing and has no complaints at this time  Pain: Well controlled with pain medication regimen  Tolerating PO: yes  Voiding: yes  Flatus: yes  BM: yes  Ambulating: yes  Breastfeeding: no  Chest pain: no  Shortness of breath: no  Leg pain: no  Lochia: appropriate    Objective:     Vitals: Blood pressure 95/50, pulse 73, temperature (!) 97 °F (36 1 °C), temperature source Tympanic, resp  rate 18, height 5' 5" (1 651 m), weight 86 2 kg (190 lb), last menstrual period 07/17/2017, SpO2 97 %, not currently breastfeeding    No intake or output data in the 24 hours ending 04/28/18 0641    Physical Exam:     General: NAD  Lungs: non-labored breathing, normal effort   Abdomen: Soft, no distension/rebound/guarding/tenderness   Fundus: Firm, non-tender, fundus: -2 cm below the umbilicus   Incision: none  Lower Extremities: Non-tender      Lab, Imaging and other studies:     Recent Results (from the past 72 hour(s))   Type and screen    Collection Time: 04/25/18  9:04 PM   Result Value Ref Range    ABO Grouping A     Rh Factor Positive     Antibody Screen Negative     Specimen Expiration Date [id number]    CBC and differential    Collection Time: 04/25/18  9:04 PM   Result Value Ref Range    WBC 11 84 (H) 4 31 - 10 16 Thousand/uL    RBC 3 54 (L) 3 81 - 5 12 Million/uL    Hemoglobin 10 2 (L) 11 5 - 15 4 g/dL    Hematocrit 31 8 (L) 34 8 - 46 1 %    MCV 90 82 - 98 fL    MCH 28 8 26 8 - 34 3 pg    MCHC 32 1 31 4 - 37 4 g/dL    RDW 14 2 11 6 - 15 1 %    MPV 11 3 8 9 - 12 7 fL    Platelets 228 149 - 390 Thousands/uL    nRBC 0 /100 WBCs    Neutrophils Relative 73 43 - 75 %    Lymphocytes Relative 19 14 - 44 %    Monocytes Relative 7 4 - 12 %    Eosinophils Relative 1 0 - 6 %    Basophils Relative 0 0 - 1 %    Neutrophils Absolute 8 67 (H) 1 85 - 7 62 Thousands/µL    Lymphocytes Absolute 2 27 0 60 - 4 47 Thousands/µL    Monocytes Absolute 0 77 0 17 - 1 22 Thousand/µL    Eosinophils Absolute 0 08 0 00 - 0 61 Thousand/µL    Basophils Absolute 0 01 0 00 - 0 10 Thousands/µL   RPR    Collection Time: 04/25/18  9:04 PM   Result Value Ref Range    RPR Non-Reactive Non-Reactive   Rapid drug screen, urine    Collection Time: 04/25/18  9:04 PM   Result Value Ref Range    Amph/Meth UR Negative Negative    Barbiturate Ur Negative Negative    Benzodiazepine Urine Negative Negative    Cocaine Urine Negative Negative    Methadone Urine Negative Negative    Opiate Urine Negative Negative    PCP Ur Negative Negative    THC Urine Negative Negative   Blood gas, arterial, cord    Collection Time: 04/26/18 11:37 AM   Result Value Ref Range    pH, Cord Art 7 254 7 230 - 7 430    pCO2, Cord Art 54 8 30 0 - 60 0    pO2, Cord Art 15 1 5 0 - 25 0 mm HG    HCO3, Cord Art 23 7 17 3 - 27 3 mmol/L    Base Exc, Cord Art -4 3 (L) 3 0 - 11 0 mmol/L    O2 Content, Cord Art 5 3 ml/dl    O2 Hgb, Arterial Cord 24 4 %   Blood gas, venous, cord    Collection Time: 04/26/18 11:37 AM   Result Value Ref Range    pH, Cord Darrell 7 268 7 190 - 7 490    pCO2, Cord Darrell 49 4 (H) 27 0 - 43 0 mm HG    pO2, Cord Darrell 18 0 15 0 - 45 0 mm HG    HCO3, Cord Darrell 22 1 12 2 - 28 6 mmol/L    Base Exc, Cord Darrell -5 2 (L) 1 0 - 9 0 mmol/L    O2 Cont, Cord Darrell 7 4 mL/dL    O2 HGB,VENOUS CORD 34 5 %     Meds:    docusate sodium 100 mg Oral BID   prenatal multivitamin 1 tablet Oral Daily       acetaminophen 650 mg Q6H PRN   benzocaine-menthol-lanolin-aloe  4x Daily PRN   calcium carbonate 1,000 mg Daily PRN   diphenhydrAMINE 25 mg Q6H PRN   hydrocortisone 1 application PRN   ibuprofen 600 mg Q6H PRN   ondansetron 4 mg Q8H PRN   oxyCODONE-acetaminophen 1 tablet Q4H PRN   oxyCODONE-acetaminophen 2 tablet Q4H PRN   witch hazel-glycerin 1 pad PRN           MD UMAIR Hi, PGY-1  4/28/2018 6:41 AM

## 2018-04-28 NOTE — DISCHARGE INSTR - LAB
Reviewed Education Information sheets from [facility] Partum Partum Instructions  Salome Molina

## 2018-04-28 NOTE — PLAN OF CARE
NORBERTO Hopson Rn Lactation Educator review of dc breastfeeding/pumping instructions with pt & questions answered    Couplet nurse review of paper avs dc isntructions with pt & questions answered

## 2018-04-30 NOTE — CASE MANAGEMENT
Notification of Maternity Inpatient Admission/Maternity Inpatient Authorization Request  This is a Notification of Maternity Inpatient Admission/Maternity Inpatient Authorization Request to our facility [facility]  Please be advised that this patient is currently in our facility under Inpatient Status  Below you will find the Birth/ Summary, Attending Physician and Facilitys information including NPI# and contact for the Utilization  assigned to the [facility] where the patient is receiving services  Please feel free to contact the Utilization Review Department with any questions  Mothers Information:  Romeo Jaime  MRN: [id number]  YOB: 1995  Admission Date: 2018  7:28 PM  Discharge Date: 2018  1:00 PM  Disposition: Home/Self Care  Admitting Diagnosis: Encounter for full-term uncomplicated delivery [Z93]  41 weeks gestation of pregnancy [Z3A 41]  [place] Information:  Estimated Date of Delivery: 18  Information for the patient's :  Leon Platt Boy  [place]) [[id number]]     [place] Delivery Information:  Sex: male  Delivered 2018 11:34 AM by Vaginal, Spontaneous Delivery; Gestational Age: 40w1d     Measurements:  Weight: 7 lb 11 oz (3487 g); Height: 19"    APGAR 1 minute 5 minutes 10 minutes   Totals: 9 9      OB History      Para Term  AB Living    2 1 1   1 1    SAB TAB Ectopic Multiple Live Births    1     0 1        Attending Physician:  EDUARDA Ellsworth    Specialty- Obstetrics and Gynecology  [facility] ID- [id number]  [address] [place] [address], [address] [place] [address] Street  Phone 1: [phone]  Fax: [phone] [facility])  [address] [place] [address]  [phone]  Tax ID: [id number]  NPI: [id number]    [address] [facility] in the [facility] by Karthik otdd 2017  Network Utilization Review Department  Phone: [phone]; Fax [phone]  ATTENTION: The Network Utilization Review Department is now centralized for our 7 Facilities  Make a note that we have a new phone and fax numbers for our Department  Please call with any questions or concerns to [phone] and carefully follow the prompts so that you are directed to the right person  All voicemails are confidential  Fax any determinations, approvals, denials, and requests for initial or continue stay review clinical to [phone]  Due to HIGH CALL volume, it would be easier if you could please send faxed requests to expedite your requests and in part, help us provide discharge notifications faster

## 2018-05-03 ENCOUNTER — TELEPHONE (OUTPATIENT)
Dept: OBGYN CLINIC | Facility: CLINIC | Age: 23
End: 2018-05-03

## 2018-05-21 ENCOUNTER — POSTPARTUM VISIT (OUTPATIENT)
Dept: OBGYN CLINIC | Facility: CLINIC | Age: 23
End: 2018-05-21

## 2018-05-21 VITALS — WEIGHT: 168 LBS | DIASTOLIC BLOOD PRESSURE: 60 MMHG | SYSTOLIC BLOOD PRESSURE: 108 MMHG | BODY MASS INDEX: 27.96 KG/M2

## 2018-05-21 DIAGNOSIS — Z30.41 ENCOUNTER FOR SURVEILLANCE OF CONTRACEPTIVE PILLS: ICD-10-CM

## 2018-05-21 PROCEDURE — 99024 POSTOP FOLLOW-UP VISIT: CPT | Performed by: OBSTETRICS & GYNECOLOGY

## 2018-05-21 RX ORDER — NORETHINDRONE ACETATE AND ETHINYL ESTRADIOL 1MG-20(21)
1 KIT ORAL DAILY
Qty: 84 TABLET | Refills: 1 | Status: SHIPPED | OUTPATIENT
Start: 2018-05-21 | End: 2018-09-12 | Stop reason: DRUGHIGH

## 2018-05-23 NOTE — PROGRESS NOTES
Postpartum Visit: Patient here for postpartum visit  She is 3 weeks post partum following a spontaneous vaginal delivery  I have fully reviewed the prenatal and intrapartum course  The delivery was at 39 gestational weeks  Outcome:   Anesthesia: epidural   Postpartum course has been uncomplicated  Baby's course has been doing well without problems  Baby is feeding by bottle doing well  Patient is tolerating regular diet, Bleeding no bleeding  Bowel function is normal  Bladder function is normal  Patient is not sexually active  Contraception method is OCP (estrogen/progesterone)  Postpartum depression screening: negative  EPDS 7, reviewed and discussed desire ocp, fully counseled, eKtan Vargas for return to work   Physical:   Vitals:    18 1803   BP: 108/60       Objective     /60 (BP Location: Left arm, Patient Position: Sitting, Cuff Size: Standard)   Wt 76 2 kg (168 lb)   LMP 2017   Breastfeeding? No Comment: bottle feeding  BMI 27 96 kg/m²   General appearance: alert and oriented, in no acute distress  Lungs: clear to auscultation bilaterally  Heart: regular rate and rhythm, S1, S2 normal, no murmur, click, rub or gallop  Abdomen: soft, non-tender; bowel sounds normal; no masses,  no organomegaly  Pelvic: external genitalia normal, vagina normal without discharge, uterus normal size, shape, and consistency, no cervical motion tenderness, no adnexal masses or tenderness, rectovaginal septum normal and urethral meatus        Assessment 64-year-old  about 3 weeks postpartum steadily recovering  Plan:   Patient fully counseled desire to restart OCP, Sent in script for Junel 1/20 Fe  Return in 3 months for annual or sooner as needed

## 2018-05-25 ENCOUNTER — TELEPHONE (OUTPATIENT)
Dept: OBGYN CLINIC | Facility: CLINIC | Age: 23
End: 2018-05-25

## 2018-08-02 ENCOUNTER — ANNUAL EXAM (OUTPATIENT)
Dept: OBGYN CLINIC | Facility: CLINIC | Age: 23
End: 2018-08-02
Payer: COMMERCIAL

## 2018-08-02 VITALS
DIASTOLIC BLOOD PRESSURE: 70 MMHG | WEIGHT: 163 LBS | HEIGHT: 65 IN | SYSTOLIC BLOOD PRESSURE: 116 MMHG | BODY MASS INDEX: 27.16 KG/M2

## 2018-08-02 DIAGNOSIS — Z11.3 SCREENING EXAMINATION FOR STD (SEXUALLY TRANSMITTED DISEASE): ICD-10-CM

## 2018-08-02 DIAGNOSIS — Z01.419 ENCNTR FOR GYN EXAM (GENERAL) (ROUTINE) W/O ABN FINDINGS: Primary | ICD-10-CM

## 2018-08-02 PROCEDURE — 99395 PREV VISIT EST AGE 18-39: CPT | Performed by: NURSE PRACTITIONER

## 2018-08-02 NOTE — PROGRESS NOTES
Assessment/Plan   Diagnoses and all orders for this visit:    Encntr for gyn exam (general) (routine) w/o abn findings  -     GP PAP/CT/GC (Reflex HPV PLUS when ASC-US)  -     GP Trichomonas By Multiplex PCR    Screening examination for STD (sexually transmitted disease)  -     Hepatitis B surface antigen; Future  -     Hepatitis C antibody  -     HIV 1/2 AG-AB combo  -     RPR  -     GP PAP/CT/GC (Reflex HPV PLUS when ASC-US)  -     GP Trichomonas By Multiplex PCR        Discussion    Reviewed with patient normal exam today  Pap with reflex done today  GC/CT/Trich done today  Rx for HIV/ Hep B/ Hep C/ RPR  Reviewed monthly SBEs  Encourage safe sexual practices  Contraception - OCP Junel Fe 1/20  Pt is on pack two just started 3rd pack  Option to wait it out and see if bleeding regulates as can take 3 months if not can call back next month and switch to different OCP  Pt states at prior OCP she was on a pill and had no problems, will look into name of pill if decides to switch  All questions have been answered to her satisfaction  RTO for annul or sooner if needed    Gerald Olson is a 25 y o  female who presents for annual well woman exam    She is 3 5 months postpartum  She is bottle feeding  Baby is doing well, getting big  Sleeping through the night  Denies any postpartum depression  Last exam > 1 year ago Pap Has not had one secondary to be < 24years old   Pap guidelines reviewed with patient  Pt would like Pap today  Pt denies any abnormal vaginal discharge, itching, or odor  Pt in a mutually exclusive relationship with a male partner and would like STD testing today  Denies any problems with intercourse since delivery  Menstrual Cycle:  LMP: 18  Period Cycle (Days): 28  Period Duration (Days): 7  Period Pattern: (!) Irregular  Menstrual Flow: Light  Menstrual Control:  Thin pad, Tampon  Dysmenorrhea: None  OB History      Para Term  AB Living    2 1 1   1 1 SAB TAB Ectopic Multiple Live Births    1     0 1    Contraception: OCP Junel Fe 1/20, menses have been very irregular since delivery  Will have for two weeks then nothing  Bleeding is a light flow to spotting, nothing heavy  Does also complain of mood swings  Unsure how much is OCP related or postpartum  Practices monthly SBEs, no breast complaints today  Denies any bowel or bladder issues  Pt follows with PCP for regular check-ups and blood work  Review of Systems   Genitourinary: Positive for menstrual problem (irregular menses)  All other systems reviewed and are negative  The following portions of the patient's history were reviewed and updated as appropriate: allergies, current medications, past family history, past medical history, past social history, past surgical history and problem list     Past Medical History:   Diagnosis Date    Anxiety     no meds during pregnancy    Depression     no meds during pregnancy    Miscarriage     Varicella     IMMUNE       No past surgical history on file  Family History   Problem Relation Age of Onset   Marce Askew Breast cancer Mother     Ovarian cancer Mother     Hypertension Father     Diabetes Maternal Aunt        Social History     Social History    Marital status: Single     Spouse name: N/A    Number of children: N/A    Years of education: N/A     Occupational History    Not on file       Social History Main Topics    Smoking status: Former Smoker     Types: Cigarettes     Quit date: 2016    Smokeless tobacco: Never Used    Alcohol use No      Comment: Social use per [employer]    Drug use: Yes     Types: Marijuana      Comment: hx of Marijuana use, stopped w/positive UPT    Sexual activity: Yes     Partners: Male     Birth control/ protection: None     Other Topics Concern    Not on file     Social History Narrative    No narrative on file         Current Outpatient Prescriptions:     norethindrone-ethinyl estradiol (Flaquita Quinteromb FE 1/20) 1-20 MG-MCG per tablet, Take 1 tablet by mouth daily, Disp: 84 tablet, Rfl: 1    No Known Allergies    Objective   Vitals:    08/02/18 1436   BP: 116/70   BP Location: Left arm   Patient Position: Sitting   Cuff Size: Standard   Weight: 73 9 kg (163 lb)   Height: 5' 5" (1 651 m)     Physical Exam   Constitutional: She is oriented to person, place, and time  She appears well-developed and well-nourished  HENT:   Head: Normocephalic  Neck: Normal range of motion  Neck supple  No tracheal deviation present  No thyromegaly present  Cardiovascular: Normal rate, regular rhythm and normal heart sounds  Pulmonary/Chest: Effort normal and breath sounds normal  Right breast exhibits no inverted nipple, no mass, no nipple discharge, no skin change and no tenderness  Left breast exhibits no inverted nipple, no mass, no nipple discharge, no skin change and no tenderness  Breasts are symmetrical    Abdominal: Soft  Bowel sounds are normal  She exhibits no distension and no mass  There is no tenderness  There is no rebound and no guarding  Genitourinary: Vagina normal and uterus normal  No breast swelling, tenderness, discharge or bleeding  No labial fusion  There is no rash, tenderness, lesion or injury on the right labia  There is no rash, tenderness, lesion or injury on the left labia  Cervix exhibits no motion tenderness, no discharge and no friability  Right adnexum displays no mass, no tenderness and no fullness  Left adnexum displays no mass, no tenderness and no fullness  Musculoskeletal: Normal range of motion  Neurological: She is alert and oriented to person, place, and time  Skin: Skin is warm and dry  Psychiatric: She has a normal mood and affect   Her behavior is normal  Judgment and thought content normal

## 2018-08-07 LAB
DEPRECATED C TRACH RRNA XXX QL PRB: NOT DETECTED
N GONORRHOEA DNA UR QL NAA+PROBE: NOT DETECTED
T VAGINALIS RRNA SPEC QL NAA+PROBE: NOT DETECTED
THIN PREP CVX: NORMAL

## 2018-09-12 ENCOUNTER — TELEPHONE (OUTPATIENT)
Dept: OBGYN CLINIC | Facility: CLINIC | Age: 23
End: 2018-09-12

## 2018-09-12 DIAGNOSIS — Z30.41 ENCOUNTER FOR SURVEILLANCE OF CONTRACEPTIVE PILLS: Primary | ICD-10-CM

## 2018-09-12 RX ORDER — NORETHINDRONE ACETATE AND ETHINYL ESTRADIOL 1.5-30(21)
1 KIT ORAL DAILY
Qty: 84 TABLET | Refills: 3 | Status: SHIPPED | OUTPATIENT
Start: 2018-09-12 | End: 2019-11-05

## 2018-09-12 NOTE — TELEPHONE ENCOUNTER
Pt states otherwise, she likes this pill and feels good on it, is wondering if she could use a higher dose?

## 2018-09-12 NOTE — TELEPHONE ENCOUNTER
Currently on Junel 1/20 discontinued and sent in [address], [address] 1 5/30 which is same hormones higher dose  Still recommend back up method first month when switching pills   Start new dose with new pack, can call with any questions

## 2018-09-25 ENCOUNTER — TELEPHONE (OUTPATIENT)
Dept: OBGYN CLINIC | Facility: CLINIC | Age: 23
End: 2018-09-25

## 2018-11-20 ENCOUNTER — OFFICE VISIT (OUTPATIENT)
Dept: OBGYN CLINIC | Facility: CLINIC | Age: 23
End: 2018-11-20
Payer: COMMERCIAL

## 2018-11-20 VITALS
DIASTOLIC BLOOD PRESSURE: 78 MMHG | HEIGHT: 65 IN | SYSTOLIC BLOOD PRESSURE: 120 MMHG | WEIGHT: 166 LBS | BODY MASS INDEX: 27.66 KG/M2

## 2018-11-20 DIAGNOSIS — Z11.3 SCREENING EXAMINATION FOR STD (SEXUALLY TRANSMITTED DISEASE): ICD-10-CM

## 2018-11-20 DIAGNOSIS — B96.89 BACTERIAL VAGINOSIS: Primary | ICD-10-CM

## 2018-11-20 DIAGNOSIS — N89.8 VAGINAL DISCHARGE: ICD-10-CM

## 2018-11-20 DIAGNOSIS — N76.0 BACTERIAL VAGINOSIS: Primary | ICD-10-CM

## 2018-11-20 PROCEDURE — 99214 OFFICE O/P EST MOD 30 MIN: CPT | Performed by: NURSE PRACTITIONER

## 2018-11-20 RX ORDER — METRONIDAZOLE 500 MG/1
500 TABLET ORAL EVERY 12 HOURS SCHEDULED
Qty: 14 TABLET | Refills: 0 | Status: SHIPPED | OUTPATIENT
Start: 2018-11-20 | End: 2018-11-27

## 2018-11-20 NOTE — ASSESSMENT & PLAN NOTE
+ clue cells, + whiff test   Based on symptoms and physical exam will treat for bacterial vaginosis  Treatment options reviewed  Rx for Flagyl BID x 7 days  Do not drink alcohol while taking will make violently ill  Acidophilous probiotic advised daily  Loose cotton clothing and cotton underwear  No scented products vaginally  Change promptly out of wet or sweaty clothing  Will send out GC/CT/Trich and genital culture per patient request   Will call with results and treat accordingly

## 2018-11-20 NOTE — PROGRESS NOTES
Assessment/Plan:    Bacterial vaginosis  + clue cells, + whiff test   Based on symptoms and physical exam will treat for bacterial vaginosis  Treatment options reviewed  Rx for Flagyl BID x 7 days  Do not drink alcohol while taking will make violently ill  Acidophilous probiotic advised daily  Loose cotton clothing and cotton underwear  No scented products vaginally  Change promptly out of wet or sweaty clothing  Will send out GC/CT/Trich and genital culture per patient request   Will call with results and treat accordingly  Diagnoses and all orders for this visit:    Bacterial vaginosis  -     metroNIDAZOLE (FLAGYL) 500 mg tablet; Take 1 tablet (500 mg total) by mouth every 12 (twelve) hours for 7 days  -     GP Culture, Genital  -     Cancel: GP Chlamydia + Gonorrhea, Liquid-Based  -     Cancel: GP Trichomonas By Multiplex PCR  -     GP Chlamydia + Gonorrhea, Liquid-Based  -     GP Trichomonas By Multiplex PCR    Vaginal discharge  -     GP Culture, Genital  -     Cancel: GP Chlamydia + Gonorrhea, Liquid-Based  -     Cancel: GP Trichomonas By Multiplex PCR  -     GP Chlamydia + Gonorrhea, Liquid-Based  -     GP Trichomonas By Multiplex PCR    Screening examination for STD (sexually transmitted disease)  -     GP Chlamydia + Gonorrhea, Liquid-Based  -     GP Trichomonas By Multiplex PCR          Subjective:      Patient ID: Betsy Osler is a 26 y o  female  Pt presents for complaints of "bacterial infection"  Pt states she has a history of getting frequent bacterial infections  She states she started with and increase in discharge which she describes as thick and slimy, denies any change in color  States fishy odor  Denies any itching or burning  Symptoms began on Sunday  Denies any new partners or products used vaginally  Pt would like STD testing  Denies any bowel or bladder complaints  Denies any pelvic pain or pain with intercourse     LMP: 11/13/18  Contraception: OCP Junel 1 5/30 The following portions of the patient's history were reviewed and updated as appropriate: allergies, current medications, past family history, past medical history, past social history, past surgical history and problem list     Review of Systems   Genitourinary: Positive for vaginal discharge  All other systems reviewed and are negative  Objective:    /78 (BP Location: Left arm, Patient Position: Sitting, Cuff Size: Standard)   Ht 5' 5" (1 651 m)   Wt 75 3 kg (166 lb)   LMP 11/13/2018 (Exact Date)   BMI 27 62 kg/m²          Physical Exam   Constitutional: She is oriented to person, place, and time  She appears well-developed and well-nourished  Cardiovascular: Normal rate, regular rhythm and normal heart sounds  Pulmonary/Chest: Effort normal and breath sounds normal    Abdominal: Soft  Bowel sounds are normal  She exhibits no distension and no mass  There is no tenderness  There is no rebound and no guarding  Genitourinary: Vagina normal and uterus normal  No labial fusion  There is no rash, tenderness, lesion or injury on the right labia  There is no rash, tenderness, lesion or injury on the left labia  Cervix exhibits no motion tenderness, no discharge and no friability  Right adnexum displays no mass, no tenderness and no fullness  Left adnexum displays no mass, no tenderness and no fullness  Musculoskeletal: Normal range of motion  Neurological: She is alert and oriented to person, place, and time  Skin: Skin is warm and dry  Psychiatric: She has a normal mood and affect   Her behavior is normal  Judgment and thought content normal

## 2018-11-23 LAB — SL AMB GENITAL CULTURE: NORMAL

## 2018-11-24 LAB
DEPRECATED C TRACH RRNA XXX QL PRB: NOT DETECTED
N GONORRHOEA DNA UR QL NAA+PROBE: NOT DETECTED
T VAGINALIS RRNA SPEC QL NAA+PROBE: NOT DETECTED

## 2018-11-29 ENCOUNTER — TELEPHONE (OUTPATIENT)
Dept: OBGYN CLINIC | Facility: CLINIC | Age: 23
End: 2018-11-29

## 2019-02-08 DIAGNOSIS — B37.3 YEAST VAGINITIS: Primary | ICD-10-CM

## 2019-02-08 RX ORDER — FLUCONAZOLE 150 MG/1
150 TABLET ORAL ONCE
Qty: 1 TABLET | Refills: 0 | Status: SHIPPED | OUTPATIENT
Start: 2019-02-08 | End: 2019-02-08

## 2019-02-15 ENCOUNTER — TELEPHONE (OUTPATIENT)
Dept: OBGYN CLINIC | Facility: CLINIC | Age: 24
End: 2019-02-15

## 2019-02-15 NOTE — TELEPHONE ENCOUNTER
Patient called in requesting appt  Has been having discharge  Had gotten an rx for yeast from us and doesn't seem to be better  Has some external itching  Patient will try hydracortisone cream externally, will try to get in to see PCP or urgent care, had no appts that would accomodate patient  Could not send to [place] location because of insurance issues

## 2019-03-08 ENCOUNTER — TELEPHONE (OUTPATIENT)
Dept: OBGYN CLINIC | Facility: CLINIC | Age: 24
End: 2019-03-08

## 2019-03-08 ENCOUNTER — OFFICE VISIT (OUTPATIENT)
Dept: OBGYN CLINIC | Facility: CLINIC | Age: 24
End: 2019-03-08
Payer: COMMERCIAL

## 2019-03-08 VITALS
HEIGHT: 65 IN | BODY MASS INDEX: 27.16 KG/M2 | WEIGHT: 163 LBS | DIASTOLIC BLOOD PRESSURE: 74 MMHG | SYSTOLIC BLOOD PRESSURE: 116 MMHG

## 2019-03-08 DIAGNOSIS — R10.2 PELVIC PAIN: Primary | ICD-10-CM

## 2019-03-08 DIAGNOSIS — B37.3 YEAST VAGINITIS: ICD-10-CM

## 2019-03-08 DIAGNOSIS — N92.6 IRREGULAR BLEEDING: ICD-10-CM

## 2019-03-08 PROBLEM — B37.31 YEAST VAGINITIS: Status: ACTIVE | Noted: 2019-03-08

## 2019-03-08 PROCEDURE — 99214 OFFICE O/P EST MOD 30 MIN: CPT | Performed by: PHYSICIAN ASSISTANT

## 2019-03-08 RX ORDER — FLUCONAZOLE 150 MG/1
150 TABLET ORAL EVERY OTHER DAY
Qty: 2 TABLET | Refills: 0 | Status: SHIPPED | OUTPATIENT
Start: 2019-03-08 | End: 2019-03-11

## 2019-03-08 NOTE — ASSESSMENT & PLAN NOTE
Reviewed yeast infection with patient  Will plan to treat with Diflucan 150 mg x 2 doses  For prevention: Recommend acidophilus or yogurt daily, avoid irritating soaps or lotions, no tight fitted pants or underwear, avoid bubble baths, do not stay in wet swimsuit  Call office if symptoms not improving

## 2019-03-08 NOTE — ASSESSMENT & PLAN NOTE
Reviewed irregular menses with patient  Urine pregnancy test negative in office  Will get blood work one to further evaluate  Office will call with results and appropriate follow up

## 2019-03-08 NOTE — PROGRESS NOTES
Assessment/Plan:    Yeast vaginitis  Reviewed yeast infection with patient  Will plan to treat with Diflucan 150 mg x 2 doses  For prevention: Recommend acidophilus or yogurt daily, avoid irritating soaps or lotions, no tight fitted pants or underwear, avoid bubble baths, do not stay in wet swimsuit  Call office if symptoms not improving  Pelvic pain  Reviewed pelvic pain with patient  Recommend treating yeast infection and seeing if pelvic pain resolves once infection is treated and menses is over  If pain is persisting can have pelvic ultrasound to further evaluate  Irregular bleeding  Reviewed irregular menses with patient  Urine pregnancy test negative in office  Will get blood work one to further evaluate  Office will call with results and appropriate follow up  Problem List Items Addressed This Visit        Genitourinary    Yeast vaginitis     Reviewed yeast infection with patient  Will plan to treat with Diflucan 150 mg x 2 doses  For prevention: Recommend acidophilus or yogurt daily, avoid irritating soaps or lotions, no tight fitted pants or underwear, avoid bubble baths, do not stay in wet swimsuit  Call office if symptoms not improving  Relevant Medications    fluconazole (DIFLUCAN) 150 mg tablet       Other    Pelvic pain - Primary     Reviewed pelvic pain with patient  Recommend treating yeast infection and seeing if pelvic pain resolves once infection is treated and menses is over  If pain is persisting can have pelvic ultrasound to further evaluate  Relevant Orders    US pelvis complete w transvaginal    Irregular bleeding     Reviewed irregular menses with patient  Urine pregnancy test negative in office  Will get blood work one to further evaluate  Office will call with results and appropriate follow up            Relevant Orders    US pelvis complete w transvaginal    CBC and differential    T4, free    TSH, 3rd generation    hCG, quantitative Subjective:      Patient ID: Daryn Terrell is a 26 y o  female  HPI  27 yo seen for vaginal discharge and itching  Reports took 1 Diflucan about a month ago for yeast symptoms after Amoxicillin, reports improved symptoms but has come back again  Described as thick white discharge  Denies fever, chills, odor, bowel or bladder issues  Patient is currently on Microgestin 1  menses typically regular with no issues  Reports this past menses started 2 days early and much more cramping  Having large dark "black" clots  No STD concerns  Patient reports takes pill every day at the same time everyday without missing any pills  Cramping is in central lower pelvis  The following portions of the patient's history were reviewed and updated as appropriate:   She  has a past medical history of Anxiety, Depression, Miscarriage, and Varicella  She   Patient Active Problem List    Diagnosis Date Noted    Yeast vaginitis 2019    Pelvic pain 2019    Irregular bleeding 2019    Bacterial vaginosis 2018    Vaginal discharge 2018     (spontaneous vaginal delivery) 2018    41 weeks gestation of pregnancy 2018    Encounter for induction of labor 2018    Post-term pregnancy, 40-42 weeks of gestation 2018    Encounter for supervision of normal pregnancy, antepartum 2018    Family history of congenital anomaly 10/05/2017    Family history of genetic disease 10/05/2017     She  has no past surgical history on file  Her family history includes Breast cancer in her mother; Diabetes in her maternal aunt; Hypertension in her father; Ovarian cancer in her mother  She  reports that she quit smoking about 3 years ago  Her smoking use included cigarettes  She has never used smokeless tobacco  She reports that she has current or past drug history  Drug: Marijuana  She reports that she does not drink alcohol    Current Outpatient Medications   Medication Sig Dispense Refill    fluconazole (DIFLUCAN) 150 mg tablet Take 1 tablet (150 mg total) by mouth every other day for 2 doses 2 tablet 0    norethindrone-ethinyl estradiol-iron (MICROGESTIN FE1 5/30) 1 5-30 MG-MCG tablet Take 1 tablet by mouth daily 84 tablet 3     No current facility-administered medications for this visit  She has No Known Allergies       Review of Systems   Constitutional: Negative for fatigue, fever and unexpected weight change  HENT: Negative for dental problem and sinus pressure  Eyes: Negative for visual disturbance  Respiratory: Negative for cough, shortness of breath and wheezing  Cardiovascular: Negative for chest pain  Gastrointestinal: Negative for abdominal pain, blood in stool, constipation, diarrhea, nausea and vomiting  Endocrine: Negative for polydipsia  Genitourinary: Positive for menstrual problem and vaginal discharge  Negative for difficulty urinating, dyspareunia, dysuria, frequency, hematuria, pelvic pain and urgency  Musculoskeletal: Negative for arthralgias and back pain  Neurological: Negative for dizziness, seizures, light-headedness and headaches  Psychiatric/Behavioral: Negative for suicidal ideas  The patient is not nervous/anxious  Objective:      /74 (BP Location: Left arm, Patient Position: Sitting, Cuff Size: Standard)   Ht 5' 5" (1 651 m)   Wt 73 9 kg (163 lb)   LMP 03/05/2019 (Exact Date)   BMI 27 12 kg/m²          Physical Exam   Constitutional: She is oriented to person, place, and time  She appears well-developed and well-nourished  Neck: Neck supple  No thyroid mass and no thyromegaly present  Cardiovascular: Normal rate, regular rhythm and normal heart sounds  Exam reveals no gallop and no friction rub  No murmur heard  Pulmonary/Chest: Effort normal and breath sounds normal  No respiratory distress  She has no wheezes  She has no rales  No breast tenderness, discharge or bleeding  Abdominal: Soft   Normal appearance and bowel sounds are normal  She exhibits no distension and no mass  There is no tenderness  There is no rebound and no guarding  Genitourinary: No breast tenderness, discharge or bleeding  There is no rash, tenderness, lesion or injury on the right labia  There is no rash, tenderness, lesion or injury on the left labia  Uterus is tender (mildly tender, no CMT  )  Uterus is not deviated and not enlarged  Cervix exhibits no motion tenderness, no discharge and no friability  Right adnexum displays no mass, no tenderness and no fullness  Left adnexum displays no mass, no tenderness and no fullness  No erythema, tenderness or bleeding in the vagina  No signs of injury around the vagina  Vaginal discharge (thick white curd like discharge in vaginal vault) found  Lymphadenopathy:     She has no cervical adenopathy  Right: No inguinal and no supraclavicular adenopathy present  Left: No inguinal and no supraclavicular adenopathy present  Neurological: She is alert and oriented to person, place, and time  Skin: Skin is warm and dry  No rash noted  No erythema  No pallor  Psychiatric: She has a normal mood and affect  Her behavior is normal  Judgment and thought content normal        UPT negative in office

## 2019-03-08 NOTE — ASSESSMENT & PLAN NOTE
Reviewed pelvic pain with patient  Recommend treating yeast infection and seeing if pelvic pain resolves once infection is treated and menses is over  If pain is persisting can have pelvic ultrasound to further evaluate

## 2019-06-04 ENCOUNTER — TELEPHONE (OUTPATIENT)
Dept: OBGYN CLINIC | Facility: CLINIC | Age: 24
End: 2019-06-04

## 2019-06-04 ENCOUNTER — OFFICE VISIT (OUTPATIENT)
Dept: OBGYN CLINIC | Facility: CLINIC | Age: 24
End: 2019-06-04

## 2019-06-04 VITALS
SYSTOLIC BLOOD PRESSURE: 118 MMHG | HEIGHT: 65 IN | DIASTOLIC BLOOD PRESSURE: 64 MMHG | BODY MASS INDEX: 25.66 KG/M2 | WEIGHT: 154 LBS

## 2019-06-04 DIAGNOSIS — B96.89 BACTERIAL VAGINOSIS: Primary | ICD-10-CM

## 2019-06-04 DIAGNOSIS — N76.0 BACTERIAL VAGINOSIS: Primary | ICD-10-CM

## 2019-06-04 PROCEDURE — 99213 OFFICE O/P EST LOW 20 MIN: CPT | Performed by: PHYSICIAN ASSISTANT

## 2019-06-04 RX ORDER — METRONIDAZOLE 500 MG/1
500 TABLET ORAL EVERY 12 HOURS SCHEDULED
Qty: 14 TABLET | Refills: 0 | Status: SHIPPED | OUTPATIENT
Start: 2019-06-04 | End: 2019-06-04 | Stop reason: SDUPTHER

## 2019-06-04 RX ORDER — METRONIDAZOLE 500 MG/1
500 TABLET ORAL EVERY 12 HOURS SCHEDULED
Qty: 14 TABLET | Refills: 0 | Status: SHIPPED | OUTPATIENT
Start: 2019-06-04 | End: 2019-06-11

## 2019-08-06 ENCOUNTER — ANNUAL EXAM (OUTPATIENT)
Dept: OBGYN CLINIC | Facility: CLINIC | Age: 24
End: 2019-08-06
Payer: COMMERCIAL

## 2019-08-06 VITALS
BODY MASS INDEX: 25.33 KG/M2 | SYSTOLIC BLOOD PRESSURE: 120 MMHG | HEIGHT: 65 IN | DIASTOLIC BLOOD PRESSURE: 76 MMHG | WEIGHT: 152 LBS

## 2019-08-06 DIAGNOSIS — N92.0 MENORRHAGIA WITH REGULAR CYCLE: ICD-10-CM

## 2019-08-06 DIAGNOSIS — N94.3 PMS (PREMENSTRUAL SYNDROME): ICD-10-CM

## 2019-08-06 DIAGNOSIS — N89.8 VAGINAL DISCHARGE: ICD-10-CM

## 2019-08-06 DIAGNOSIS — N94.6 DYSMENORRHEA: ICD-10-CM

## 2019-08-06 DIAGNOSIS — R10.2 PELVIC PAIN: ICD-10-CM

## 2019-08-06 DIAGNOSIS — Z01.419 ROUTINE GYNECOLOGICAL EXAMINATION: Primary | ICD-10-CM

## 2019-08-06 DIAGNOSIS — Z11.3 SCREENING EXAMINATION FOR VENEREAL DISEASE: ICD-10-CM

## 2019-08-06 PROCEDURE — 99395 PREV VISIT EST AGE 18-39: CPT | Performed by: PHYSICIAN ASSISTANT

## 2019-08-06 RX ORDER — DROSPIRENONE AND ETHINYL ESTRADIOL 0.02-3(28)
1 KIT ORAL DAILY
Qty: 28 TABLET | Refills: 3 | Status: SHIPPED | OUTPATIENT
Start: 2019-08-06 | End: 2019-11-05 | Stop reason: SDUPTHER

## 2019-08-06 NOTE — ASSESSMENT & PLAN NOTE
Reviewed increased mood swings as well as menorrhagia and dysmenorrhea on current pill  Will plan to switch to [facility] OCP in attempt to improve  Reviewed when to start, what to do if misses pill  Recommend using condoms for the 1st month on the pill, if misses more than 2 pills in the pack, if on antibiotics and for STD prevention  Reviewed common side effects of the pill including nausea, vomiting, breast pain, bloating, fatigue, mood swings, weight gain, and increased acne  Reassured side effects typically diminish in the first month or two on the pill  Reviewed clotting risk and signs and symptoms of pulmonary embolism, DVT, myocardial infarction, stroke  Will RTO in 3 months for pill check

## 2019-08-06 NOTE — PROGRESS NOTES
Assessment/Plan   Problem List Items Addressed This Visit        Genitourinary    Dysmenorrhea    Relevant Medications    drospirenone-ethinyl estradiol (NAZANIN) 3-0 02 MG per tablet       Other    Vaginal discharge    Relevant Orders    GP Trichomonas By Multiplex PCR    Pelvic pain     Reviewed pelvic pain the day after intercourse with patient  Will get STD cultures to further evaluate  No abnormal findings on exam today  Possibly getting postcoital UTI's, can try starting cranberry tablet  May be having postcoital cramping secondary to ejaculant, can try withdrawal method or condoms to see if improves  Office will call with STD culture results and appropriate follow up  Routine gynecological examination - Primary     Pap guidelines reviewed  Pap with reflex and STD cultures done today  Relevant Orders    GP PAP/CT/GC (Reflex HPV PLUS when ASC-US)    PMS (premenstrual syndrome)     Reviewed increased mood swings as well as menorrhagia and dysmenorrhea on current pill  Will plan to switch to Nazanin OCP in attempt to improve  Reviewed when to start, what to do if misses pill  Recommend using condoms for the 1st month on the pill, if misses more than 2 pills in the pack, if on antibiotics and for STD prevention  Reviewed common side effects of the pill including nausea, vomiting, breast pain, bloating, fatigue, mood swings, weight gain, and increased acne  Reassured side effects typically diminish in the first month or two on the pill  Reviewed clotting risk and signs and symptoms of pulmonary embolism, DVT, myocardial infarction, stroke  Will RTO in 3 months for pill check            Relevant Medications    drospirenone-ethinyl estradiol (NAZANIN) 3-0 02 MG per tablet    Menorrhagia with regular cycle    Relevant Medications    drospirenone-ethinyl estradiol (NAZANIN) 3-0 02 MG per tablet      Other Visit Diagnoses     Screening examination for venereal disease        Relevant Orders    GP PAP/CT/GC (Reflex HPV PLUS when ASC-US)          Subjective:     Patient ID: David Garcia is a 26 y o  y o  female  HPI  27 yo seen for annual  Currently on Microgestin 1   Reports over the last 6 months or so has been having a lot more clotting with menses  Not terribly heavy just a lot of clots when she goes to the bathroom  Also reports cramping worsening  More mood swings in the last few months, feeling angry, crying for no reason, feels distant  "Not herself"  Denies thoughts to hurt self or others  Mostly with menses but lately has been occurring more often  Denies bowel or bladder issues  Having more pain the day after intercourse, feels like menstrual like cramping  No pain with intercourse or immediately after, but the next day has terrible cramping off and on  Thought she had a UTI at one point because she was having urinary frequency but resolved on own  Denies bowel or bladder issues  Has noticed some thin clear white discharge, denies odor, itching  Had a pelvic ultrasound done by PCP 2019 secondary to unexplained abdominal pain and vomiting  Pelvic ultrasound normal  Abdominal ultrasound revealed mild abnormality of kidneys  Vomiting resolved on own, was thought to possibly be secondary to stress  Last pap: 2018 NILM  The following portions of the patient's history were reviewed and updated as appropriate:   She  has a past medical history of Anxiety, Depression, Miscarriage, and Varicella    She   Patient Active Problem List    Diagnosis Date Noted    Routine gynecological examination 2019    PMS (premenstrual syndrome) 2019    Dysmenorrhea 2019    Menorrhagia with regular cycle 2019    Yeast vaginitis 2019    Pelvic pain 2019    Irregular bleeding 2019    Bacterial vaginosis 2018    Vaginal discharge 2018     (spontaneous vaginal delivery) 2018    41 weeks gestation of pregnancy 2018    Encounter for induction of labor 2018    Post-term pregnancy, 40-42 weeks of gestation 2018    Encounter for supervision of normal pregnancy, antepartum 2018    Family history of congenital anomaly 10/05/2017    Family history of genetic disease 10/05/2017     She  has no past surgical history on file  Her family history includes Breast cancer in her mother; Diabetes in her maternal aunt; Hypertension in her father; Ovarian cancer in her mother  She  reports that she quit smoking about 3 years ago  Her smoking use included cigarettes  She has never used smokeless tobacco  She reports that she has current or past drug history  Drug: Marijuana  She reports that she does not drink alcohol  Current Outpatient Medications   Medication Sig Dispense Refill    drospirenone-ethinyl estradiol (NAZANIN) 3-0 02 MG per tablet Take 1 tablet by mouth daily 28 tablet 3    norethindrone-ethinyl estradiol-iron (MICROGESTIN FE1 ) 1 5-30 MG-MCG tablet Take 1 tablet by mouth daily 84 tablet 3     No current facility-administered medications for this visit  She has No Known Allergies       Menstrual History:  OB History        2    Para   1    Term   1            AB   1    Living   1       SAB   1    TAB        Ectopic        Multiple   0    Live Births   1                Menarche age: 7  Patient's last menstrual period was 2019  Period Cycle (Days): 28  Period Duration (Days): 4-7  Period Pattern: Regular  Menstrual Flow: Moderate  Dysmenorrhea: None      Review of Systems   Constitutional: Negative for fatigue, fever and unexpected weight change  HENT: Negative for dental problem and sinus pressure  Eyes: Negative for visual disturbance  Respiratory: Negative for cough, shortness of breath and wheezing  Cardiovascular: Negative for chest pain  Gastrointestinal: Negative for abdominal pain, blood in stool, constipation, diarrhea, nausea and vomiting     Endocrine: Negative for polydipsia  Genitourinary: Negative for difficulty urinating, dyspareunia, dysuria, frequency, hematuria, pelvic pain and urgency  Musculoskeletal: Negative for arthralgias and back pain  Neurological: Negative for dizziness, seizures, light-headedness and headaches  Psychiatric/Behavioral: Negative for suicidal ideas  The patient is not nervous/anxious  Objective:  Vitals:    08/06/19 1722   BP: 120/76   BP Location: Left arm   Patient Position: Sitting   Cuff Size: Standard   Weight: 68 9 kg (152 lb)   Height: 5' 5" (1 651 m)      Physical Exam   Constitutional: She is oriented to person, place, and time  She appears well-developed and well-nourished  Genitourinary: Vagina normal and uterus normal  There is no rash, tenderness, lesion, injury or Bartholin's cyst on the right labia  There is no rash, tenderness, lesion, injury or Bartholin's cyst on the left labia  Vagina exhibits no lesion  No erythema, tenderness or bleeding in the vagina  No signs of injury around the vagina  No vaginal discharge found  Right adnexum does not display mass, does not display tenderness and does not display fullness  Left adnexum does not display mass, does not display tenderness and does not display fullness  Cervix does not exhibit motion tenderness, lesion or discharge  Uterus is not enlarged, tender, exhibiting a mass, irregular (is regular) or mobile  HENT:   Head: Normocephalic and atraumatic  Neck: No thyromegaly present  Cardiovascular: Normal rate, regular rhythm and normal heart sounds  Exam reveals no gallop and no friction rub  No murmur heard  Pulmonary/Chest: Effort normal and breath sounds normal  No respiratory distress  She has no wheezes  Right breast exhibits no inverted nipple, no mass, no nipple discharge, no skin change and no tenderness  Left breast exhibits no inverted nipple, no mass, no nipple discharge, no skin change and no tenderness  No breast swelling   Breasts are symmetrical    Abdominal: Soft  She exhibits no distension and no mass  There is no tenderness  There is no rebound and no guarding  No hernia  Lymphadenopathy:     She has no cervical adenopathy  Right: No inguinal adenopathy present  Left: No inguinal adenopathy present  Neurological: She is alert and oriented to person, place, and time  Skin: Skin is warm and dry  Psychiatric: She has a normal mood and affect   Her behavior is normal

## 2019-08-06 NOTE — ASSESSMENT & PLAN NOTE
Reviewed pelvic pain the day after intercourse with patient  Will get STD cultures to further evaluate  No abnormal findings on exam today  Possibly getting postcoital UTI's, can try starting cranberry tablet  May be having postcoital cramping secondary to ejaculant, can try withdrawal method or condoms to see if improves  Office will call with STD culture results and appropriate follow up

## 2019-08-13 LAB
DEPRECATED C TRACH RRNA XXX QL PRB: NOT DETECTED
N GONORRHOEA DNA UR QL NAA+PROBE: NOT DETECTED
T VAGINALIS RRNA SPEC QL NAA+PROBE: NOT DETECTED
THIN PREP CVX: ABNORMAL

## 2019-08-14 DIAGNOSIS — B37.3 VAGINAL YEAST INFECTION: Primary | ICD-10-CM

## 2019-08-14 RX ORDER — FLUCONAZOLE 150 MG/1
150 TABLET ORAL EVERY OTHER DAY
Qty: 2 TABLET | Refills: 0 | Status: SHIPPED | OUTPATIENT
Start: 2019-08-14 | End: 2019-08-17

## 2019-08-16 DIAGNOSIS — Z30.41 ENCOUNTER FOR SURVEILLANCE OF CONTRACEPTIVE PILLS: ICD-10-CM

## 2019-08-18 RX ORDER — NORETHINDRONE ACETATE AND ETHINYL ESTRADIOL AND FERROUS FUMARATE 1.5-30(21)
KIT ORAL
Qty: 28 TABLET | Refills: 8 | OUTPATIENT
Start: 2019-08-18

## 2019-10-22 ENCOUNTER — TELEPHONE (OUTPATIENT)
Dept: OBGYN CLINIC | Facility: CLINIC | Age: 24
End: 2019-10-22

## 2019-10-22 NOTE — TELEPHONE ENCOUNTER
Patient called with pain and burning in her vagina, no visible signs of cuts, swollen areas or discharge  Advised patient to try warm compresses and if still there in the morning to call to see if we have any cancellations

## 2019-11-05 ENCOUNTER — OFFICE VISIT (OUTPATIENT)
Dept: OBGYN CLINIC | Facility: CLINIC | Age: 24
End: 2019-11-05
Payer: COMMERCIAL

## 2019-11-05 VITALS
HEIGHT: 65 IN | SYSTOLIC BLOOD PRESSURE: 120 MMHG | BODY MASS INDEX: 25.16 KG/M2 | WEIGHT: 151 LBS | DIASTOLIC BLOOD PRESSURE: 78 MMHG

## 2019-11-05 DIAGNOSIS — N94.6 DYSMENORRHEA: ICD-10-CM

## 2019-11-05 DIAGNOSIS — N94.3 PMS (PREMENSTRUAL SYNDROME): ICD-10-CM

## 2019-11-05 DIAGNOSIS — N92.0 MENORRHAGIA WITH REGULAR CYCLE: ICD-10-CM

## 2019-11-05 PROCEDURE — 99212 OFFICE O/P EST SF 10 MIN: CPT | Performed by: PHYSICIAN ASSISTANT

## 2019-11-05 RX ORDER — DROSPIRENONE AND ETHINYL ESTRADIOL 0.02-3(28)
1 KIT ORAL DAILY
Qty: 84 TABLET | Refills: 3 | Status: SHIPPED | OUTPATIENT
Start: 2019-11-05 | End: 2020-09-10 | Stop reason: SDUPTHER

## 2019-11-05 NOTE — PROGRESS NOTES
Assessment/Plan:    PMS (premenstrual syndrome)  Improved on Nazanin OCP, will plan to continue  Call office if having any issues  Menorrhagia with regular cycle  Improved with Nazanin, will plan to continue  Problem List Items Addressed This Visit        Genitourinary    Dysmenorrhea    Relevant Medications    drospirenone-ethinyl estradiol (NAZANIN) 3-0 02 MG per tablet       Other    PMS (premenstrual syndrome)     Improved on Nazanin OCP, will plan to continue  Call office if having any issues  Relevant Medications    drospirenone-ethinyl estradiol (NAZANIN) 3-0 02 MG per tablet    Menorrhagia with regular cycle     Improved with Nazanin, will plan to continue  Relevant Medications    drospirenone-ethinyl estradiol (NAZANIN) 3-0 02 MG per tablet            Subjective:      Patient ID: Chirag Best is a 22 y o  female  HPI  21 yo seen for pill check  Patient was started on Nazanin OCP 2019 secondary to increased PMS  Reports moods are much better on this pill  Also noticing less clotting and light flow  Denies bowel or bladder issues  No acne, mood swings, nausea, vomiting, breast pain, bloating, or fatigue  The following portions of the patient's history were reviewed and updated as appropriate:   She  has a past medical history of Anxiety, Depression, Miscarriage, and Varicella    She   Patient Active Problem List    Diagnosis Date Noted    Routine gynecological examination 2019    PMS (premenstrual syndrome) 2019    Dysmenorrhea 2019    Menorrhagia with regular cycle 2019    Yeast vaginitis 2019    Pelvic pain 2019    Irregular bleeding 2019    Bacterial vaginosis 2018    Vaginal discharge 2018     (spontaneous vaginal delivery) 2018    41 weeks gestation of pregnancy 2018    Encounter for induction of labor 2018    Post-term pregnancy, 40-42 weeks of gestation 2018    Encounter for supervision of normal pregnancy, antepartum 03/30/2018    Family history of congenital anomaly 10/05/2017    Family history of genetic disease 10/05/2017     She  has no past surgical history on file  Her family history includes Breast cancer in her mother; Diabetes in her maternal aunt; Hypertension in her father; Ovarian cancer in her mother  She  reports that she quit smoking about 3 years ago  Her smoking use included cigarettes  She has never used smokeless tobacco  She reports that she has current or past drug history  Drug: Marijuana  She reports that she does not drink alcohol  Current Outpatient Medications   Medication Sig Dispense Refill    drospirenone-ethinyl estradiol (NAZANIN) 3-0 02 MG per tablet Take 1 tablet by mouth daily 84 tablet 3    metroNIDAZOLE (FLAGYL) 500 mg tablet Take 1 tablet (500 mg total) by mouth 2 (two) times a day for 7 days 14 tablet 0     No current facility-administered medications for this visit  She has No Known Allergies       Review of Systems   Constitutional: Negative for fatigue, fever and unexpected weight change  HENT: Negative for dental problem and sinus pressure  Eyes: Negative for visual disturbance  Respiratory: Negative for cough, shortness of breath and wheezing  Cardiovascular: Negative for chest pain  Gastrointestinal: Negative for abdominal pain, blood in stool, constipation, diarrhea, nausea and vomiting  Endocrine: Negative for polydipsia  Genitourinary: Negative for difficulty urinating, dyspareunia, dysuria, frequency, hematuria, pelvic pain and urgency  Musculoskeletal: Negative for arthralgias and back pain  Neurological: Negative for dizziness, seizures, light-headedness and headaches  Psychiatric/Behavioral: Negative for suicidal ideas  The patient is not nervous/anxious            Objective:      /78 (BP Location: Left arm, Patient Position: Sitting, Cuff Size: Standard)   Ht 5' 5" (1 651 m)   Wt 68 5 kg (151 lb)   LMP 10/26/2019 (Approximate)   BMI 25 13 kg/m²          Physical Exam   Constitutional: She is oriented to person, place, and time  She appears well-developed and well-nourished  Neck: No thyromegaly present  Cardiovascular: Normal rate, regular rhythm and normal heart sounds  Exam reveals no gallop and no friction rub  No murmur heard  Pulmonary/Chest: Effort normal and breath sounds normal  No respiratory distress  She has no wheezes  She has no rales  She exhibits no tenderness  Neurological: She is alert and oriented to person, place, and time  Skin: Skin is warm and dry  Psychiatric: She has a normal mood and affect   Her behavior is normal

## 2019-11-15 DIAGNOSIS — B96.89 BACTERIAL VAGINOSIS: Primary | ICD-10-CM

## 2019-11-15 DIAGNOSIS — N76.0 BACTERIAL VAGINOSIS: Primary | ICD-10-CM

## 2019-11-15 RX ORDER — METRONIDAZOLE 500 MG/1
500 TABLET ORAL 2 TIMES DAILY
Qty: 14 TABLET | Refills: 0 | Status: SHIPPED | OUTPATIENT
Start: 2019-11-15 | End: 2019-11-22

## 2020-01-13 DIAGNOSIS — B37.9 YEAST INFECTION: Primary | ICD-10-CM

## 2020-01-13 RX ORDER — FLUCONAZOLE 150 MG/1
150 TABLET ORAL EVERY OTHER DAY
Qty: 2 TABLET | Refills: 0 | Status: SHIPPED | OUTPATIENT
Start: 2020-01-13 | End: 2020-01-16

## 2020-01-31 ENCOUNTER — TELEPHONE (OUTPATIENT)
Dept: OBGYN CLINIC | Facility: CLINIC | Age: 25
End: 2020-01-31

## 2020-09-10 ENCOUNTER — ANNUAL EXAM (OUTPATIENT)
Dept: OBGYN CLINIC | Facility: CLINIC | Age: 25
End: 2020-09-10
Payer: COMMERCIAL

## 2020-09-10 VITALS
TEMPERATURE: 97.8 F | HEIGHT: 65 IN | DIASTOLIC BLOOD PRESSURE: 74 MMHG | BODY MASS INDEX: 25.66 KG/M2 | WEIGHT: 154 LBS | SYSTOLIC BLOOD PRESSURE: 112 MMHG

## 2020-09-10 DIAGNOSIS — N92.0 MENORRHAGIA WITH REGULAR CYCLE: ICD-10-CM

## 2020-09-10 DIAGNOSIS — N94.3 PMS (PREMENSTRUAL SYNDROME): ICD-10-CM

## 2020-09-10 DIAGNOSIS — Z01.419 GYNECOLOGIC EXAM NORMAL: Primary | ICD-10-CM

## 2020-09-10 DIAGNOSIS — N94.6 DYSMENORRHEA: ICD-10-CM

## 2020-09-10 PROCEDURE — S0612 ANNUAL GYNECOLOGICAL EXAMINA: HCPCS | Performed by: PHYSICIAN ASSISTANT

## 2020-09-10 PROCEDURE — G0145 SCR C/V CYTO,THINLAYER,RESCR: HCPCS | Performed by: PATHOLOGY

## 2020-09-10 PROCEDURE — G0124 SCREEN C/V THIN LAYER BY MD: HCPCS | Performed by: PATHOLOGY

## 2020-09-10 RX ORDER — DROSPIRENONE AND ETHINYL ESTRADIOL 0.02-3(28)
1 KIT ORAL DAILY
Qty: 84 TABLET | Refills: 3 | Status: SHIPPED | OUTPATIENT
Start: 2020-09-10 | End: 2021-10-11

## 2020-09-10 NOTE — PROGRESS NOTES
Assessment/Plan   Problem List Items Addressed This Visit        Genitourinary    Dysmenorrhea    Relevant Medications    drospirenone-ethinyl estradiol (NAZANIN) 3-0 02 MG per tablet       Other    PMS (premenstrual syndrome)    Relevant Medications    drospirenone-ethinyl estradiol (NAZANIN) 3-0 02 MG per tablet    Menorrhagia with regular cycle    Relevant Medications    drospirenone-ethinyl estradiol (NAZANIN) 3-0 02 MG per tablet    Gynecologic exam normal - Primary     Pap guidelines reviewed  Pap with reflex done today  Reviewed mother with breast cancer diagnosed in 42's and possible other GYN cancer currently unsure what type  Reviewed with patient to see if mother has had genetic testing if not can consider testing herself  Information for HOPE line given  Will plan to continue Nazanin OCP  Return to office for annual or as needed  Relevant Orders    Liquid-based pap, screening          Subjective:     Patient ID: Severiano Saltness is a 26 y o  y o  female  HPI  27 yo seen for annual exam  Currently on Nazanin OCP, tolerating well would like to continue  Denies bowel or bladder issues  Last pap: 2019 LGSIL, 2018 NILM   The following portions of the patient's history were reviewed and updated as appropriate:   She  has a past medical history of Anxiety, Depression, Migraines, Miscarriage, and Varicella    She   Patient Active Problem List    Diagnosis Date Noted    Gynecologic exam normal 09/10/2020    Routine gynecological examination 2019    PMS (premenstrual syndrome) 2019    Dysmenorrhea 2019    Menorrhagia with regular cycle 2019    Yeast vaginitis 2019    Pelvic pain 2019    Irregular bleeding 2019    Bacterial vaginosis 2018    Vaginal discharge 2018     (spontaneous vaginal delivery) 2018    41 weeks gestation of pregnancy 2018    Encounter for induction of labor 2018    Post-term pregnancy, 40-42 weeks of gestation 2018    Encounter for supervision of normal pregnancy, antepartum 2018    Family history of congenital anomaly 10/05/2017    Family history of genetic disease 10/05/2017     She  has no past surgical history on file  Her family history includes Anxiety disorder in her mother; Brain cancer in her other; Breast cancer (age of onset: 36) in her mother; Depression in her mother; Diabetes in her maternal aunt and mother; Hypertension in her father and maternal grandmother; Kidney cancer in her maternal grandfather, mother, and other; Other in her father; Ovarian cancer in her mother  She  reports that she quit smoking about 4 years ago  Her smoking use included cigarettes  She has never used smokeless tobacco  She reports previous drug use  She reports that she does not drink alcohol  Current Outpatient Medications   Medication Sig Dispense Refill    drospirenone-ethinyl estradiol (NAZANIN) 3-0 02 MG per tablet Take 1 tablet by mouth daily 84 tablet 3     No current facility-administered medications for this visit  She has No Known Allergies       Menstrual History:  OB History        2    Para   1    Term   1            AB   1    Living   1       SAB   1    TAB        Ectopic        Multiple   0    Live Births   1                  Patient's last menstrual period was 2020 (approximate)  Review of Systems   Constitutional: Negative for fatigue, fever and unexpected weight change  HENT: Negative for dental problem and sinus pressure  Eyes: Negative for visual disturbance  Respiratory: Negative for cough, shortness of breath and wheezing  Cardiovascular: Negative for chest pain  Gastrointestinal: Negative for abdominal pain, blood in stool, constipation, diarrhea, nausea and vomiting  Endocrine: Negative for polydipsia  Genitourinary: Negative for difficulty urinating, dyspareunia, dysuria, frequency, hematuria, pelvic pain and urgency  Musculoskeletal: Negative for arthralgias and back pain  Neurological: Negative for dizziness, seizures, light-headedness and headaches  Psychiatric/Behavioral: Negative for suicidal ideas  The patient is not nervous/anxious  Objective:  Vitals:    09/10/20 0853 09/10/20 0917   BP:  112/74   BP Location:  Left arm   Patient Position:  Sitting   Cuff Size:  Standard   Temp:  97 8 °F (36 6 °C)   Weight:  69 9 kg (154 lb)   Height: 5' 5" (1 651 m) 5' 5" (1 651 m)      Physical Exam  Constitutional:       Appearance: Normal appearance  She is well-developed  Genitourinary:      Vulva, urethra, bladder, vagina and uterus normal       No vulval condylomata, lesion, tenderness, ulcerations, Bartholin's cyst or rash noted  No signs of labial injury  No labial fusion  No inguinal adenopathy present in the right or left side  No urethral prolapse, pain, swelling, tenderness, caruncle, mass or diverticulum present  Bladder is not distended or tender  No signs of injury or lesions in the vagina  No vaginal discharge, erythema, tenderness or bleeding  No cervical motion tenderness, discharge or lesion  Uterus is not enlarged, tender, irregular or mobile  No uterine mass detected  No right or left adnexal mass present  Right adnexa not tender or full  Left adnexa not tender or full  HENT:      Head: Normocephalic and atraumatic  Neck:      Thyroid: No thyromegaly  Cardiovascular:      Rate and Rhythm: Normal rate and regular rhythm  Heart sounds: Normal heart sounds  No murmur  No friction rub  No gallop  Pulmonary:      Effort: Pulmonary effort is normal  No respiratory distress  Breath sounds: Normal breath sounds  No wheezing  Chest:      Breasts: Breasts are symmetrical          Right: Normal  No swelling, bleeding, inverted nipple, mass, nipple discharge, skin change or tenderness           Left: Normal  No swelling, bleeding, inverted nipple, mass, nipple discharge, skin change or tenderness  Abdominal:      General: There is no distension  Palpations: Abdomen is soft  There is no mass  Tenderness: There is no abdominal tenderness  There is no guarding or rebound  Hernia: No hernia is present  Lymphadenopathy:      Cervical: No cervical adenopathy  Upper Body:      Right upper body: No supraclavicular, axillary or pectoral adenopathy  Left upper body: No supraclavicular, axillary or pectoral adenopathy  Lower Body: No right inguinal adenopathy  No left inguinal adenopathy  Neurological:      Mental Status: She is alert and oriented to person, place, and time  Skin:     General: Skin is warm and dry     Psychiatric:         Behavior: Behavior normal

## 2020-09-10 NOTE — ASSESSMENT & PLAN NOTE
Pap guidelines reviewed  Pap with reflex done today  Reviewed mother with breast cancer diagnosed in 42's and possible other GYN cancer currently unsure what type  Reviewed with patient to see if mother has had genetic testing if not can consider testing herself  Information for HOPE line given  Will plan to continue Nesha OCP  Return to office for annual or as needed

## 2020-09-16 LAB
LAB AP GYN PRIMARY INTERPRETATION: ABNORMAL
LAB AP LMP: ABNORMAL
Lab: ABNORMAL
PATH INTERP SPEC-IMP: ABNORMAL

## 2020-11-19 ENCOUNTER — TELEPHONE (OUTPATIENT)
Dept: FAMILY MEDICINE CLINIC | Facility: CLINIC | Age: 25
End: 2020-11-19

## 2020-11-19 ENCOUNTER — TELEMEDICINE (OUTPATIENT)
Dept: FAMILY MEDICINE CLINIC | Facility: CLINIC | Age: 25
End: 2020-11-19
Payer: COMMERCIAL

## 2020-11-19 DIAGNOSIS — Z20.822 CLOSE EXPOSURE TO COVID-19 VIRUS: ICD-10-CM

## 2020-11-19 DIAGNOSIS — Z20.822 CLOSE EXPOSURE TO COVID-19 VIRUS: Primary | ICD-10-CM

## 2020-11-19 PROCEDURE — 87637 SARSCOV2&INF A&B&RSV AMP PRB: CPT | Performed by: FAMILY MEDICINE

## 2020-11-19 PROCEDURE — 99213 OFFICE O/P EST LOW 20 MIN: CPT | Performed by: FAMILY MEDICINE

## 2020-11-20 ENCOUNTER — TELEPHONE (OUTPATIENT)
Dept: FAMILY MEDICINE CLINIC | Facility: CLINIC | Age: 25
End: 2020-11-20

## 2020-11-21 LAB
FLUAV RNA NPH QL NAA+PROBE: NOT DETECTED
FLUBV RNA NPH QL NAA+PROBE: NOT DETECTED
RSV RNA NPH QL NAA+PROBE: NOT DETECTED
SARS-COV-2 RNA NPH QL NAA+PROBE: NOT DETECTED

## 2020-12-03 ENCOUNTER — TELEMEDICINE (OUTPATIENT)
Dept: FAMILY MEDICINE CLINIC | Facility: CLINIC | Age: 25
End: 2020-12-03
Payer: COMMERCIAL

## 2020-12-03 VITALS — WEIGHT: 154 LBS | BODY MASS INDEX: 25.63 KG/M2

## 2020-12-03 DIAGNOSIS — Z71.89 ADVICE GIVEN ABOUT COVID-19 VIRUS INFECTION: Primary | ICD-10-CM

## 2020-12-03 DIAGNOSIS — Z03.818 ENCOUNTER FOR OBSERVATION FOR SUSPECTED EXPOSURE TO OTHER BIOLOGICAL AGENTS RULED OUT: ICD-10-CM

## 2020-12-03 PROCEDURE — 3725F SCREEN DEPRESSION PERFORMED: CPT | Performed by: FAMILY MEDICINE

## 2020-12-03 PROCEDURE — 1036F TOBACCO NON-USER: CPT | Performed by: FAMILY MEDICINE

## 2020-12-03 PROCEDURE — 99213 OFFICE O/P EST LOW 20 MIN: CPT | Performed by: FAMILY MEDICINE

## 2021-02-23 ENCOUNTER — OFFICE VISIT (OUTPATIENT)
Dept: FAMILY MEDICINE CLINIC | Facility: CLINIC | Age: 26
End: 2021-02-23
Payer: COMMERCIAL

## 2021-02-23 ENCOUNTER — LAB (OUTPATIENT)
Dept: LAB | Facility: CLINIC | Age: 26
End: 2021-02-23
Payer: COMMERCIAL

## 2021-02-23 VITALS — TEMPERATURE: 98.4 F | HEIGHT: 65 IN | BODY MASS INDEX: 25.99 KG/M2 | WEIGHT: 156 LBS | OXYGEN SATURATION: 99 %

## 2021-02-23 DIAGNOSIS — Z00.00 ANNUAL PHYSICAL EXAM: ICD-10-CM

## 2021-02-23 DIAGNOSIS — G43.009 MIGRAINE WITHOUT AURA AND WITHOUT STATUS MIGRAINOSUS, NOT INTRACTABLE: ICD-10-CM

## 2021-02-23 DIAGNOSIS — R45.89 SYMPTOMS OF DEPRESSION: ICD-10-CM

## 2021-02-23 DIAGNOSIS — M54.2 NECK DISCOMFORT: ICD-10-CM

## 2021-02-23 DIAGNOSIS — Z00.00 ANNUAL PHYSICAL EXAM: Primary | ICD-10-CM

## 2021-02-23 PROBLEM — Z34.90 ENCOUNTER FOR SUPERVISION OF NORMAL PREGNANCY, ANTEPARTUM: Status: RESOLVED | Noted: 2018-03-30 | Resolved: 2021-02-23

## 2021-02-23 PROBLEM — O48.0 POST-TERM PREGNANCY, 40-42 WEEKS OF GESTATION: Status: RESOLVED | Noted: 2018-04-25 | Resolved: 2021-02-23

## 2021-02-23 LAB
ALBUMIN SERPL BCP-MCNC: 4.3 G/DL (ref 3.5–5)
ALP SERPL-CCNC: 69 U/L (ref 46–116)
ALT SERPL W P-5'-P-CCNC: 22 U/L (ref 12–78)
ANION GAP SERPL CALCULATED.3IONS-SCNC: 5 MMOL/L (ref 4–13)
AST SERPL W P-5'-P-CCNC: 12 U/L (ref 5–45)
BASOPHILS # BLD AUTO: 0.05 THOUSANDS/ΜL (ref 0–0.1)
BASOPHILS NFR BLD AUTO: 0 % (ref 0–1)
BILIRUB SERPL-MCNC: 0.4 MG/DL (ref 0.2–1)
BUN SERPL-MCNC: 14 MG/DL (ref 5–25)
CALCIUM SERPL-MCNC: 9.6 MG/DL (ref 8.3–10.1)
CHLORIDE SERPL-SCNC: 107 MMOL/L (ref 100–108)
CHOLEST SERPL-MCNC: 159 MG/DL (ref 50–200)
CO2 SERPL-SCNC: 28 MMOL/L (ref 21–32)
CREAT SERPL-MCNC: 0.79 MG/DL (ref 0.6–1.3)
EOSINOPHIL # BLD AUTO: 0.12 THOUSAND/ΜL (ref 0–0.61)
EOSINOPHIL NFR BLD AUTO: 1 % (ref 0–6)
ERYTHROCYTE [DISTWIDTH] IN BLOOD BY AUTOMATED COUNT: 12.3 % (ref 11.6–15.1)
GFR SERPL CREATININE-BSD FRML MDRD: 105 ML/MIN/1.73SQ M
GLUCOSE P FAST SERPL-MCNC: 88 MG/DL (ref 65–99)
HCT VFR BLD AUTO: 38.1 % (ref 34.8–46.1)
HDLC SERPL-MCNC: 69 MG/DL
HGB BLD-MCNC: 12.4 G/DL (ref 11.5–15.4)
IMM GRANULOCYTES # BLD AUTO: 0.04 THOUSAND/UL (ref 0–0.2)
IMM GRANULOCYTES NFR BLD AUTO: 0 % (ref 0–2)
LDLC SERPL CALC-MCNC: 77 MG/DL (ref 0–100)
LYMPHOCYTES # BLD AUTO: 2.35 THOUSANDS/ΜL (ref 0.6–4.47)
LYMPHOCYTES NFR BLD AUTO: 21 % (ref 14–44)
MCH RBC QN AUTO: 30.2 PG (ref 26.8–34.3)
MCHC RBC AUTO-ENTMCNC: 32.5 G/DL (ref 31.4–37.4)
MCV RBC AUTO: 93 FL (ref 82–98)
MONOCYTES # BLD AUTO: 0.93 THOUSAND/ΜL (ref 0.17–1.22)
MONOCYTES NFR BLD AUTO: 8 % (ref 4–12)
NEUTROPHILS # BLD AUTO: 7.89 THOUSANDS/ΜL (ref 1.85–7.62)
NEUTS SEG NFR BLD AUTO: 70 % (ref 43–75)
NONHDLC SERPL-MCNC: 90 MG/DL
NRBC BLD AUTO-RTO: 0 /100 WBCS
PLATELET # BLD AUTO: 253 THOUSANDS/UL (ref 149–390)
PMV BLD AUTO: 11.6 FL (ref 8.9–12.7)
POTASSIUM SERPL-SCNC: 3.8 MMOL/L (ref 3.5–5.3)
PROT SERPL-MCNC: 8.5 G/DL (ref 6.4–8.2)
RBC # BLD AUTO: 4.1 MILLION/UL (ref 3.81–5.12)
SODIUM SERPL-SCNC: 140 MMOL/L (ref 136–145)
TRIGL SERPL-MCNC: 65 MG/DL
TSH SERPL DL<=0.05 MIU/L-ACNC: 1.65 UIU/ML (ref 0.36–3.74)
WBC # BLD AUTO: 11.38 THOUSAND/UL (ref 4.31–10.16)

## 2021-02-23 PROCEDURE — 80061 LIPID PANEL: CPT

## 2021-02-23 PROCEDURE — 85025 COMPLETE CBC W/AUTO DIFF WBC: CPT

## 2021-02-23 PROCEDURE — 1036F TOBACCO NON-USER: CPT | Performed by: FAMILY MEDICINE

## 2021-02-23 PROCEDURE — 80053 COMPREHEN METABOLIC PANEL: CPT

## 2021-02-23 PROCEDURE — 36415 COLL VENOUS BLD VENIPUNCTURE: CPT

## 2021-02-23 PROCEDURE — 84443 ASSAY THYROID STIM HORMONE: CPT

## 2021-02-23 PROCEDURE — 99395 PREV VISIT EST AGE 18-39: CPT | Performed by: FAMILY MEDICINE

## 2021-02-23 PROCEDURE — 3008F BODY MASS INDEX DOCD: CPT | Performed by: FAMILY MEDICINE

## 2021-02-23 PROCEDURE — 3725F SCREEN DEPRESSION PERFORMED: CPT | Performed by: FAMILY MEDICINE

## 2021-02-23 RX ORDER — SUMATRIPTAN 100 MG/1
100 TABLET, FILM COATED ORAL ONCE AS NEEDED
Qty: 9 TABLET | Refills: 3 | Status: SHIPPED | OUTPATIENT
Start: 2021-02-23 | End: 2021-07-20

## 2021-02-23 NOTE — PROGRESS NOTES
Assessment/Plan:         Problem List Items Addressed This Visit        Cardiovascular and Mediastinum    Migraine without aura and without status migrainosus, not intractable     Has had for many years use excedrin x1 if no response imitrex x 2 side effects explained  gabriel were worsened with BCP         Relevant Medications    SUMAtriptan (IMITREX) 100 mg tablet       Other    Annual physical exam - Primary     Pt would like routine labs         Relevant Orders    CBC and differential    Comprehensive metabolic panel    Lipid panel    Symptoms of depression     Pt states mild and will be getting therapy  Declines meds         Relevant Orders    TSH, 3rd generation    Neck discomfort     TMJ and sternocleidomastoid muscle tenderness gabriel due to clenching of her teeth                 Subjective:  Pt here for annual physical pt has had some discomfort both sides of anterior neck on off also pain in TMJ area bilaterally on and off  Does clench her teeth  Pt with some anxiety and ,mild depression  Has had depression in the past only feels it is mild and better on BCP which she stopped pt is setting up counseling does not want meds pt also with migraines uses several excedrin  Daily except past 2 weeks only used 1 since has not had headache also coincides with stopping BCP       Patient ID: Alessandra Velazquez is a 26 y o  female  HPI    The following portions of the patient's history were reviewed and updated as appropriate:   Past Medical History:  She has a past medical history of Anxiety, Depression, Migraines, Miscarriage, and Varicella  ,  _______________________________________________________________________  Medical Problems:  does not have any pertinent problems on file ,  _______________________________________________________________________  Past Surgical History:   has no past surgical history on file ,  _______________________________________________________________________  Family History:  family history includes Anxiety disorder in her mother; Brain cancer in her other; Breast cancer (age of onset: 36) in her mother; Depression in her mother; Diabetes in her maternal aunt and mother; Hypertension in her father and maternal grandmother; Kidney cancer in her maternal grandfather, mother, and other; Other in her father; Ovarian cancer in her mother ,  _______________________________________________________________________  Social History:   reports that she quit smoking about 5 years ago  Her smoking use included cigarettes  She has never used smokeless tobacco  She reports previous drug use  She reports that she does not drink alcohol ,  _______________________________________________________________________  Allergies:  has No Known Allergies     _______________________________________________________________________  Current Outpatient Medications   Medication Sig Dispense Refill    drospirenone-ethinyl estradiol (NAZANIN) 3-0 02 MG per tablet Take 1 tablet by mouth daily 84 tablet 3    SUMAtriptan (IMITREX) 100 mg tablet Take 1 tablet (100 mg total) by mouth once as needed for migraine for up to 1 dose 9 tablet 3     No current facility-administered medications for this visit       _______________________________________________________________________  Review of Systems   Constitutional: Negative for appetite change, chills, fatigue and fever  Respiratory: Negative for cough, chest tightness and shortness of breath  Cardiovascular: Negative for chest pain, palpitations and leg swelling  Gastrointestinal: Negative for abdominal pain, constipation, diarrhea, nausea and vomiting  Endocrine: Negative  Genitourinary: Negative for difficulty urinating and frequency  Musculoskeletal: Negative for arthralgias, back pain and neck pain  TMJ and anterior neck discom)fort on off   Skin: Negative for rash  Neurological: Positive for headaches   Negative for dizziness, weakness, light-headedness and numbness  Hematological: Does not bruise/bleed easily  Psychiatric/Behavioral: Negative for dysphoric mood (mild depression), self-injury, sleep disturbance and suicidal ideas  The patient is not nervous/anxious  Objective:  Vitals:    02/23/21 0859   Temp: 98 4 °F (36 9 °C)   SpO2: 99%   Weight: 70 8 kg (156 lb)   Height: 5' 5" (1 651 m)     Body mass index is 25 96 kg/m²  Physical Exam  Vitals signs reviewed  Constitutional:       General: She is not in acute distress  Appearance: Normal appearance  She is well-developed  She is not ill-appearing  HENT:      Head: Normocephalic  Right Ear: Tympanic membrane, ear canal and external ear normal       Left Ear: Tympanic membrane, ear canal and external ear normal       Nose: Nose normal       Mouth/Throat:      Mouth: Mucous membranes are moist       Pharynx: No oropharyngeal exudate  Eyes:      General: Lids are normal  No scleral icterus  Extraocular Movements: Extraocular movements intact  Conjunctiva/sclera: Conjunctivae normal       Pupils: Pupils are equal, round, and reactive to light  Neck:      Musculoskeletal: Normal range of motion and neck supple  Thyroid: No thyromegaly  Vascular: No carotid bruit  Cardiovascular:      Rate and Rhythm: Normal rate and regular rhythm  Pulses: Normal pulses  Heart sounds: Normal heart sounds  No murmur  No friction rub  Pulmonary:      Effort: Pulmonary effort is normal       Breath sounds: Normal breath sounds  No wheezing, rhonchi or rales  Abdominal:      General: Bowel sounds are normal  There is no distension  Palpations: Abdomen is soft  There is no mass  Tenderness: There is no abdominal tenderness  There is no guarding  Hernia: No hernia is present  Musculoskeletal: Normal range of motion  General: Tenderness (mild tendernbess sternocleidomastoid and TMJ) present  Lymphadenopathy:      Cervical: No cervical adenopathy  Skin:     General: Skin is warm and dry  Findings: No rash  Comments: No abnormal appearing moles  Many tattoos   Neurological:      General: No focal deficit present  Mental Status: She is alert and oriented to person, place, and time  Mental status is at baseline  Cranial Nerves: No cranial nerve deficit  Sensory: No sensory deficit  Motor: No weakness, tremor or abnormal muscle tone  Coordination: Coordination normal       Gait: Gait normal       Deep Tendon Reflexes: Reflexes normal    Psychiatric:         Mood and Affect: Mood normal          Speech: Speech normal          Behavior: Behavior normal        BMI Counseling: Body mass index is 25 96 kg/m²  The BMI is above normal  Nutrition recommendations include 3-5 servings of fruits/vegetables daily, reducing fast food intake, consuming healthier snacks, decreasing soda and/or juice intake, moderation in carbohydrate intake and increasing intake of lean protein  Exercise recommendations include exercising 3-5 times per week and strength training exercises

## 2021-02-23 NOTE — ASSESSMENT & PLAN NOTE
Has had for many years use excedrin x1 if no response imitrex x 2 side effects explained   likley were worsened with BCP

## 2021-02-25 ENCOUNTER — TELEPHONE (OUTPATIENT)
Dept: FAMILY MEDICINE CLINIC | Facility: CLINIC | Age: 26
End: 2021-02-25

## 2021-02-25 DIAGNOSIS — D72.9 ABNORMAL WBC COUNT: Primary | ICD-10-CM

## 2021-03-25 ENCOUNTER — APPOINTMENT (OUTPATIENT)
Dept: LAB | Facility: CLINIC | Age: 26
End: 2021-03-25
Payer: COMMERCIAL

## 2021-03-25 DIAGNOSIS — D72.9 ABNORMAL WBC COUNT: ICD-10-CM

## 2021-03-25 LAB
BASOPHILS # BLD AUTO: 0.02 THOUSANDS/ΜL (ref 0–0.1)
BASOPHILS NFR BLD AUTO: 0 % (ref 0–1)
EOSINOPHIL # BLD AUTO: 0.24 THOUSAND/ΜL (ref 0–0.61)
EOSINOPHIL NFR BLD AUTO: 5 % (ref 0–6)
ERYTHROCYTE [DISTWIDTH] IN BLOOD BY AUTOMATED COUNT: 13 % (ref 11.6–15.1)
HCT VFR BLD AUTO: 37 % (ref 34.8–46.1)
HGB BLD-MCNC: 11.8 G/DL (ref 11.5–15.4)
IMM GRANULOCYTES # BLD AUTO: 0.02 THOUSAND/UL (ref 0–0.2)
IMM GRANULOCYTES NFR BLD AUTO: 0 % (ref 0–2)
LYMPHOCYTES # BLD AUTO: 0.96 THOUSANDS/ΜL (ref 0.6–4.47)
LYMPHOCYTES NFR BLD AUTO: 21 % (ref 14–44)
MCH RBC QN AUTO: 30.6 PG (ref 26.8–34.3)
MCHC RBC AUTO-ENTMCNC: 31.9 G/DL (ref 31.4–37.4)
MCV RBC AUTO: 96 FL (ref 82–98)
MONOCYTES # BLD AUTO: 0.72 THOUSAND/ΜL (ref 0.17–1.22)
MONOCYTES NFR BLD AUTO: 16 % (ref 4–12)
NEUTROPHILS # BLD AUTO: 2.63 THOUSANDS/ΜL (ref 1.85–7.62)
NEUTS SEG NFR BLD AUTO: 58 % (ref 43–75)
NRBC BLD AUTO-RTO: 0 /100 WBCS
PLATELET # BLD AUTO: 189 THOUSANDS/UL (ref 149–390)
PMV BLD AUTO: 11.5 FL (ref 8.9–12.7)
RBC # BLD AUTO: 3.86 MILLION/UL (ref 3.81–5.12)
WBC # BLD AUTO: 4.59 THOUSAND/UL (ref 4.31–10.16)

## 2021-03-25 PROCEDURE — 85025 COMPLETE CBC W/AUTO DIFF WBC: CPT

## 2021-03-25 PROCEDURE — 36415 COLL VENOUS BLD VENIPUNCTURE: CPT

## 2021-04-12 DIAGNOSIS — B37.3 VAGINAL YEAST INFECTION: Primary | ICD-10-CM

## 2021-04-13 RX ORDER — FLUCONAZOLE 150 MG/1
150 TABLET ORAL EVERY OTHER DAY
Qty: 2 TABLET | Refills: 0 | Status: SHIPPED | OUTPATIENT
Start: 2021-04-13 | End: 2021-04-16

## 2021-07-20 DIAGNOSIS — G43.009 MIGRAINE WITHOUT AURA AND WITHOUT STATUS MIGRAINOSUS, NOT INTRACTABLE: ICD-10-CM

## 2021-07-20 RX ORDER — SUMATRIPTAN 100 MG/1
100 TABLET, FILM COATED ORAL ONCE AS NEEDED
Qty: 9 TABLET | Refills: 3 | Status: SHIPPED | OUTPATIENT
Start: 2021-07-20 | End: 2022-03-02 | Stop reason: ALTCHOICE

## 2021-09-15 ENCOUNTER — ANNUAL EXAM (OUTPATIENT)
Dept: OBGYN CLINIC | Facility: CLINIC | Age: 26
End: 2021-09-15
Payer: COMMERCIAL

## 2021-09-15 VITALS
HEIGHT: 65 IN | DIASTOLIC BLOOD PRESSURE: 80 MMHG | BODY MASS INDEX: 26.66 KG/M2 | WEIGHT: 160 LBS | SYSTOLIC BLOOD PRESSURE: 126 MMHG

## 2021-09-15 DIAGNOSIS — L70.0 ACNE VULGARIS: ICD-10-CM

## 2021-09-15 DIAGNOSIS — R53.83 FATIGUE, UNSPECIFIED TYPE: ICD-10-CM

## 2021-09-15 DIAGNOSIS — Z01.419 ROUTINE GYNECOLOGICAL EXAMINATION: Primary | ICD-10-CM

## 2021-09-15 DIAGNOSIS — Z11.3 SCREENING EXAMINATION FOR VENEREAL DISEASE: ICD-10-CM

## 2021-09-15 PROCEDURE — G0124 SCREEN C/V THIN LAYER BY MD: HCPCS | Performed by: PATHOLOGY

## 2021-09-15 PROCEDURE — G0145 SCR C/V CYTO,THINLAYER,RESCR: HCPCS | Performed by: PATHOLOGY

## 2021-09-15 PROCEDURE — 87491 CHLMYD TRACH DNA AMP PROBE: CPT | Performed by: PHYSICIAN ASSISTANT

## 2021-09-15 PROCEDURE — 87624 HPV HI-RISK TYP POOLED RSLT: CPT | Performed by: PHYSICIAN ASSISTANT

## 2021-09-15 PROCEDURE — 87591 N.GONORRHOEAE DNA AMP PROB: CPT | Performed by: PHYSICIAN ASSISTANT

## 2021-09-15 PROCEDURE — S0612 ANNUAL GYNECOLOGICAL EXAMINA: HCPCS | Performed by: PHYSICIAN ASSISTANT

## 2021-09-15 NOTE — PROGRESS NOTES
Assessment/Plan   Problem List Items Addressed This Visit        Other    Routine gynecological examination - Primary     Pap guidelines reviewed  Pap with reflex done today  Will plan to continue [facility] OCP  Script renewed  Reviewed symptoms with patient  Will plan blood work to further evaluate, office will call with results and appropriate follow up  Reviewed breast pain with patient, reassured normal exam today  Recommend avoiding excessive salt and caffeine intake as well as trying Vitamin E or Evening Primrose oil to help with pain  May improve the longer she is on the OCP  Reviewed with patient is blood work normal and symptoms persisting can try to change to another OCP to see if helps  Return to office for annual or as needed  Relevant Orders    Liquid-based pap, screening      Other Visit Diagnoses     Fatigue, unspecified type        Relevant Orders    TSH, 3rd generation    T4, free    CBC and differential    Iron Panel (Includes Ferritin, Iron Sat%, Iron, and TIBC)    Acne vulgaris        Relevant Orders    TSH, 3rd generation    T4, free    Screening examination for venereal disease        Relevant Orders    Chlamydia/GC amplified DNA by PCR          Subjective:     Patient ID: Uvaldo Marie is a 23 y o  y o  female  HPI  22 yo seen for annual exam  Currently on [facility] OCP  Reports increased fatigue, bloating, acne mostly on chin, and breast tenderness  Stopped birth control for a few months to see if that was a contributor and felt worse  Has been back on birth control for the past 4 months  This past month reports brown discharge most of the month, no itching or irritation, denies pelvic pain, fever, chills  Would like STD cultures done today  Denies chance of pregnancy  Last pap: 9/10/2020 [facility], 8/6/2019 [facility], 8/2/2018 [facility]     The following portions of the patient's history were reviewed and updated as appropriate:   She  has a past medical history of Anxiety, Depression, Migraines, Miscarriage, and Varicella  She   Patient Active Problem List    Diagnosis Date Noted    Annual physical exam 2021    Symptoms of depression 2021    Neck discomfort 2021    Migraine without aura and without status migrainosus, not intractable 2021    Gynecologic exam normal 09/10/2020    Routine gynecological examination 2019    PMS (premenstrual syndrome) 2019    Dysmenorrhea 2019    Menorrhagia with regular cycle 2019    Yeast vaginitis 2019    Pelvic pain 2019    Irregular bleeding 2019    Bacterial vaginosis 2018    Vaginal discharge 2018    41 weeks gestation of pregnancy 2018    Encounter for induction of labor 2018    Family history of congenital anomaly 10/05/2017    Family history of genetic disease 10/05/2017     She  has no past surgical history on file  Her family history includes Anxiety disorder in her mother; Brain cancer in her other; Breast cancer (age of onset: 36) in her mother; Depression in her mother; Diabetes in her maternal aunt and mother; Hypertension in her father and maternal grandmother; Kidney cancer in her maternal grandfather, mother, and other; Other in her father; Ovarian cancer in her mother  She  reports that she quit smoking about 5 years ago  Her smoking use included cigarettes  She has never used smokeless tobacco  She reports previous drug use  She reports that she does not drink alcohol  Current Outpatient Medications   Medication Sig Dispense Refill    drospirenone-ethinyl estradiol (NAZANIN) 3-0 02 MG per tablet Take 1 tablet by mouth daily 84 tablet 3    SUMAtriptan (IMITREX) 100 mg tablet TAKE 1 TABLET (100 MG TOTAL) BY MOUTH ONCE AS NEEDED FOR MIGRAINE FOR UP TO 1 DOSE 9 tablet 3     No current facility-administered medications for this visit  She has No Known Allergies       Menstrual History:  OB History        2    Para   1 Term   1            AB   1    Living   1       SAB   1    TAB        Ectopic        Multiple   0    Live Births   1                  Patient's last menstrual period was 2021 (exact date)  Review of Systems   Constitutional: Positive for fatigue  Negative for fever and unexpected weight change  HENT: Negative for dental problem and sinus pressure  Eyes: Negative for visual disturbance  Respiratory: Negative for cough, shortness of breath and wheezing  Cardiovascular: Negative for chest pain  Gastrointestinal: Positive for abdominal distention  Negative for abdominal pain, blood in stool, constipation, diarrhea, nausea and vomiting  Endocrine: Negative for polydipsia  Genitourinary: Positive for vaginal discharge  Negative for difficulty urinating, dyspareunia, dysuria, frequency, hematuria, pelvic pain and urgency  Musculoskeletal: Negative for arthralgias and back pain  Neurological: Negative for dizziness, seizures, light-headedness and headaches  Psychiatric/Behavioral: Negative for suicidal ideas  The patient is not nervous/anxious  Objective:  Vitals:    09/15/21 1051   BP: 126/80   BP Location: Left arm   Patient Position: Sitting   Cuff Size: Standard   Weight: 72 6 kg (160 lb)   Height: 5' 5" (1 651 m)      Physical Exam  Constitutional:       Appearance: Normal appearance  She is well-developed  Genitourinary:      Vulva, urethra, bladder, vagina and uterus normal       No vulval condylomata, lesion, tenderness, ulcerations, Bartholin's cyst or rash noted  No signs of labial injury  No labial fusion  No inguinal adenopathy present in the right or left side  No urethral prolapse, pain, swelling, tenderness, caruncle, mass or diverticulum present  Bladder is not distended or tender  No signs of injury or lesions in the vagina  No vaginal discharge, erythema, tenderness or bleeding        No cervical motion tenderness, discharge or lesion  Uterus is not enlarged, tender, irregular or mobile  No uterine mass detected  No right or left adnexal mass present  Right adnexa not tender or full  Left adnexa not tender or full  HENT:      Head: Normocephalic and atraumatic  Neck:      Thyroid: No thyromegaly  Cardiovascular:      Rate and Rhythm: Normal rate and regular rhythm  Heart sounds: Normal heart sounds  No murmur heard  No friction rub  No gallop  Pulmonary:      Effort: Pulmonary effort is normal  No respiratory distress  Breath sounds: Normal breath sounds  No wheezing  Chest:      Breasts: Breasts are symmetrical          Right: Normal  No swelling, bleeding, inverted nipple, mass, nipple discharge, skin change or tenderness  Left: Normal  No swelling, bleeding, inverted nipple, mass, nipple discharge, skin change or tenderness  Abdominal:      General: There is no distension  Palpations: Abdomen is soft  There is no mass  Tenderness: There is no abdominal tenderness  There is no guarding or rebound  Hernia: No hernia is present  Lymphadenopathy:      Cervical: No cervical adenopathy  Upper Body:      Right upper body: No supraclavicular, axillary or pectoral adenopathy  Left upper body: No supraclavicular, axillary or pectoral adenopathy  Lower Body: No right inguinal adenopathy  No left inguinal adenopathy  Neurological:      Mental Status: She is alert and oriented to person, place, and time  Skin:     General: Skin is warm and dry     Psychiatric:         Behavior: Behavior normal

## 2021-09-15 NOTE — PATIENT INSTRUCTIONS
Reviewed avoiding excessive salt and caffeine intake as well as trying Vitamin E, Evening Primrose oil to help with pain

## 2021-09-15 NOTE — ASSESSMENT & PLAN NOTE
Pap guidelines reviewed  Pap with reflex done today  Will plan to continue [facility] OCP  Script renewed  Reviewed symptoms with patient  Will plan blood work to further evaluate, office will call with results and appropriate follow up  Reviewed breast pain with patient, reassured normal exam today  Recommend avoiding excessive salt and caffeine intake as well as trying Vitamin E or Evening Primrose oil to help with pain  May improve the longer she is on the OCP  Reviewed with patient is blood work normal and symptoms persisting can try to change to another OCP to see if helps  Return to office for annual or as needed

## 2021-09-17 LAB
C TRACH DNA SPEC QL NAA+PROBE: NEGATIVE
N GONORRHOEA DNA SPEC QL NAA+PROBE: NEGATIVE

## 2021-09-28 LAB
HPV HR 12 DNA CVX QL NAA+PROBE: POSITIVE
HPV16 DNA CVX QL NAA+PROBE: NEGATIVE
HPV18 DNA CVX QL NAA+PROBE: NEGATIVE

## 2021-10-10 DIAGNOSIS — N94.3 PMS (PREMENSTRUAL SYNDROME): ICD-10-CM

## 2021-10-10 DIAGNOSIS — N94.6 DYSMENORRHEA: ICD-10-CM

## 2021-10-10 DIAGNOSIS — N92.0 MENORRHAGIA WITH REGULAR CYCLE: ICD-10-CM

## 2021-10-26 ENCOUNTER — PROCEDURE VISIT (OUTPATIENT)
Dept: OBGYN CLINIC | Facility: CLINIC | Age: 26
End: 2021-10-26
Payer: COMMERCIAL

## 2021-10-26 VITALS
WEIGHT: 158 LBS | BODY MASS INDEX: 26.33 KG/M2 | SYSTOLIC BLOOD PRESSURE: 124 MMHG | DIASTOLIC BLOOD PRESSURE: 82 MMHG | HEIGHT: 65 IN

## 2021-10-26 DIAGNOSIS — R87.810 ASCUS WITH POSITIVE HIGH RISK HPV CERVICAL: Primary | ICD-10-CM

## 2021-10-26 DIAGNOSIS — R87.610 ASCUS WITH POSITIVE HIGH RISK HPV CERVICAL: Primary | ICD-10-CM

## 2021-10-26 PROCEDURE — 88305 TISSUE EXAM BY PATHOLOGIST: CPT | Performed by: SPECIALIST

## 2021-10-26 PROCEDURE — 57456 ENDOCERV CURETTAGE W/SCOPE: CPT | Performed by: PHYSICIAN ASSISTANT

## 2021-10-29 ENCOUNTER — TELEPHONE (OUTPATIENT)
Dept: OBGYN CLINIC | Facility: CLINIC | Age: 26
End: 2021-10-29

## 2022-02-18 ENCOUNTER — OFFICE VISIT (OUTPATIENT)
Dept: URGENT CARE | Facility: CLINIC | Age: 27
End: 2022-02-18
Payer: COMMERCIAL

## 2022-02-18 ENCOUNTER — HOSPITAL ENCOUNTER (EMERGENCY)
Facility: HOSPITAL | Age: 27
Discharge: HOME | End: 2022-02-18
Attending: EMERGENCY MEDICINE
Payer: COMMERCIAL

## 2022-02-18 VITALS
TEMPERATURE: 98.3 F | OXYGEN SATURATION: 99 % | WEIGHT: 160 LBS | DIASTOLIC BLOOD PRESSURE: 71 MMHG | RESPIRATION RATE: 18 BRPM | BODY MASS INDEX: 26.63 KG/M2 | HEART RATE: 76 BPM | SYSTOLIC BLOOD PRESSURE: 120 MMHG

## 2022-02-18 VITALS
TEMPERATURE: 97.9 F | HEART RATE: 83 BPM | BODY MASS INDEX: 26.66 KG/M2 | SYSTOLIC BLOOD PRESSURE: 113 MMHG | OXYGEN SATURATION: 98 % | HEIGHT: 65 IN | WEIGHT: 160 LBS | RESPIRATION RATE: 18 BRPM | DIASTOLIC BLOOD PRESSURE: 64 MMHG

## 2022-02-18 DIAGNOSIS — R10.11 RIGHT UPPER QUADRANT PAIN: ICD-10-CM

## 2022-02-18 DIAGNOSIS — R10.13 ABDOMINAL PAIN, ACUTE, EPIGASTRIC: ICD-10-CM

## 2022-02-18 DIAGNOSIS — K29.00 ACUTE SUPERFICIAL GASTRITIS WITHOUT HEMORRHAGE: Primary | ICD-10-CM

## 2022-02-18 DIAGNOSIS — R10.13 EPIGASTRIC PAIN: Primary | ICD-10-CM

## 2022-02-18 LAB
ALBUMIN SERPL BCP-MCNC: 4 G/DL (ref 3.5–5)
ALP SERPL-CCNC: 62 U/L (ref 46–116)
ALT SERPL W P-5'-P-CCNC: 18 U/L (ref 12–78)
ANION GAP SERPL CALCULATED.3IONS-SCNC: 8 MMOL/L (ref 4–13)
AST SERPL W P-5'-P-CCNC: 13 U/L (ref 5–45)
BASOPHILS # BLD AUTO: 0.03 THOUSANDS/ΜL (ref 0–0.1)
BASOPHILS NFR BLD AUTO: 1 % (ref 0–1)
BILIRUB SERPL-MCNC: 0.21 MG/DL (ref 0.2–1)
BUN SERPL-MCNC: 8 MG/DL (ref 5–25)
CALCIUM SERPL-MCNC: 9 MG/DL (ref 8.3–10.1)
CHLORIDE SERPL-SCNC: 104 MMOL/L (ref 100–108)
CO2 SERPL-SCNC: 28 MMOL/L (ref 21–32)
CREAT SERPL-MCNC: 0.77 MG/DL (ref 0.6–1.3)
EOSINOPHIL # BLD AUTO: 0.09 THOUSAND/ΜL (ref 0–0.61)
EOSINOPHIL NFR BLD AUTO: 1 % (ref 0–6)
ERYTHROCYTE [DISTWIDTH] IN BLOOD BY AUTOMATED COUNT: 12.8 % (ref 11.6–15.1)
GFR SERPL CREATININE-BSD FRML MDRD: 107 ML/MIN/1.73SQ M
GLUCOSE SERPL-MCNC: 94 MG/DL (ref 65–140)
HCT VFR BLD AUTO: 37 % (ref 34.8–46.1)
HGB BLD-MCNC: 12.3 G/DL (ref 11.5–15.4)
IMM GRANULOCYTES # BLD AUTO: 0.02 THOUSAND/UL (ref 0–0.2)
IMM GRANULOCYTES NFR BLD AUTO: 0 % (ref 0–2)
LIPASE SERPL-CCNC: 115 U/L (ref 73–393)
LYMPHOCYTES # BLD AUTO: 2.45 THOUSANDS/ΜL (ref 0.6–4.47)
LYMPHOCYTES NFR BLD AUTO: 38 % (ref 14–44)
MCH RBC QN AUTO: 31.2 PG (ref 26.8–34.3)
MCHC RBC AUTO-ENTMCNC: 33.2 G/DL (ref 31.4–37.4)
MCV RBC AUTO: 94 FL (ref 82–98)
MONOCYTES # BLD AUTO: 0.64 THOUSAND/ΜL (ref 0.17–1.22)
MONOCYTES NFR BLD AUTO: 10 % (ref 4–12)
NEUTROPHILS # BLD AUTO: 3.25 THOUSANDS/ΜL (ref 1.85–7.62)
NEUTS SEG NFR BLD AUTO: 50 % (ref 43–75)
NRBC BLD AUTO-RTO: 0 /100 WBCS
PLATELET # BLD AUTO: 234 THOUSANDS/UL (ref 149–390)
PMV BLD AUTO: 11.2 FL (ref 8.9–12.7)
POTASSIUM SERPL-SCNC: 3.2 MMOL/L (ref 3.5–5.3)
PROT SERPL-MCNC: 8 G/DL (ref 6.4–8.2)
RBC # BLD AUTO: 3.94 MILLION/UL (ref 3.81–5.12)
SODIUM SERPL-SCNC: 140 MMOL/L (ref 136–145)
WBC # BLD AUTO: 6.48 THOUSAND/UL (ref 4.31–10.16)

## 2022-02-18 PROCEDURE — 99283 EMERGENCY DEPT VISIT LOW MDM: CPT

## 2022-02-18 PROCEDURE — 99213 OFFICE O/P EST LOW 20 MIN: CPT | Performed by: PHYSICIAN ASSISTANT

## 2022-02-18 PROCEDURE — 83690 ASSAY OF LIPASE: CPT | Performed by: EMERGENCY MEDICINE

## 2022-02-18 PROCEDURE — 80053 COMPREHEN METABOLIC PANEL: CPT | Performed by: EMERGENCY MEDICINE

## 2022-02-18 PROCEDURE — 99284 EMERGENCY DEPT VISIT MOD MDM: CPT | Performed by: EMERGENCY MEDICINE

## 2022-02-18 PROCEDURE — 36415 COLL VENOUS BLD VENIPUNCTURE: CPT

## 2022-02-18 PROCEDURE — 85025 COMPLETE CBC W/AUTO DIFF WBC: CPT | Performed by: EMERGENCY MEDICINE

## 2022-02-18 RX ORDER — MAGNESIUM HYDROXIDE/ALUMINUM HYDROXICE/SIMETHICONE 120; 1200; 1200 MG/30ML; MG/30ML; MG/30ML
30 SUSPENSION ORAL ONCE
Status: COMPLETED | OUTPATIENT
Start: 2022-02-18 | End: 2022-02-18

## 2022-02-18 RX ORDER — PANTOPRAZOLE SODIUM 40 MG/1
40 TABLET, DELAYED RELEASE ORAL DAILY
Qty: 30 TABLET | Refills: 0 | Status: SHIPPED | OUTPATIENT
Start: 2022-02-18 | End: 2022-03-18 | Stop reason: SDUPTHER

## 2022-02-18 RX ORDER — LIDOCAINE HYDROCHLORIDE 20 MG/ML
15 SOLUTION OROPHARYNGEAL ONCE
Status: COMPLETED | OUTPATIENT
Start: 2022-02-18 | End: 2022-02-18

## 2022-02-18 RX ADMIN — LIDOCAINE HYDROCHLORIDE 15 ML: 20 SOLUTION ORAL; TOPICAL at 21:12

## 2022-02-18 RX ADMIN — ALUMINA, MAGNESIA, AND SIMETHICONE ORAL SUSPENSION REGULAR STRENGTH 30 ML: 1200; 1200; 120 SUSPENSION ORAL at 21:12

## 2022-02-18 NOTE — PROGRESS NOTES
[facility]        NAME: Gillian Gross is a 23 y o  female  : 1995    MRN: [id number]  DATE: 2022  TIME: 5:59 PM    Assessment and Plan   Epigastric pain [R10 13]  1  Epigastric pain  Transfer to other facility   2  Right upper quadrant pain  Transfer to other facility     Epigastric/RUQ abdominal sharp constant pain since yesterday  Denies any nausea/vomiting/diarrhea  States pain did get worse after eating  Denies any history of acid reflux  States she has been trying over-the-counter medications such as Pepto-Bismol with little improvement  Denies any urinary symptoms  Denies any history of abdominal surgeries or gallbladder issues  States she has never had this pain before  Patient Instructions     Patient would like to go via private vehicle to [facility] Department for further evaluation, workup and treatment    Chief Complaint     Chief Complaint   Patient presents with    Abdominal Pain     pt presents with mid epigastric pain since 1300 yesterday, denies n/v/d, no urinary complaints, reports tried Pepto Bismol without relief          History of Present Illness       71-year-old female presents with worsening epigastric/right upper quadrant/periumbilical abdominal pain since yesterday  She states it started after she ate yesterday  States it is constant, sometimes achy but then gets very sharp  States she has been trying over-the-counter Pepto-Bismol with little improvement  She denies any history of acid reflux  States he has never had this kind of pain before  States she had a normal bowel movement, denies any blood in the stool  She denies any nausea, vomiting, diarrhea  Denies any bad foods, new restaurants or sick contacts  She denies any urinary symptoms  She denies any chest pain, shortness of breath  States she is currently on her menstrual cycle, denies any pregnancy risk    Denies any history of abdominal surgeries or abdominal problems  Review of Systems   Review of Systems   Constitutional: Negative for activity change, appetite change, chills, fatigue and fever  HENT: Negative for congestion, ear pain, facial swelling, postnasal drip, rhinorrhea, sinus pressure, sore throat, trouble swallowing and voice change  Eyes: Negative for discharge, itching and visual disturbance  Respiratory: Negative for cough, chest tightness, shortness of breath and wheezing  Cardiovascular: Negative for chest pain  Gastrointestinal: Positive for abdominal pain  Negative for blood in stool, constipation, diarrhea, nausea and vomiting  Genitourinary: Negative for decreased urine volume, dysuria, flank pain, frequency, genital sores, hematuria, pelvic pain, urgency, vaginal bleeding, vaginal discharge and vaginal pain  Musculoskeletal: Negative for back pain, myalgias and neck pain  Skin: Negative for rash  Neurological: Negative for dizziness, syncope, weakness, light-headedness, numbness and headaches  All other systems reviewed and are negative  Current Medications       Current Outpatient Medications:     SUMAtriptan (IMITREX) 100 mg tablet, TAKE 1 TABLET (100 MG TOTAL) BY MOUTH ONCE AS NEEDED FOR MIGRAINE FOR UP TO 1 DOSE, Disp: 9 tablet, Rfl: 3    Vestura 3-0 02 MG per tablet, TAKE 1 TABLET BY MOUTH EVERY DAY, Disp: 84 tablet, Rfl: 3    Current Allergies     Allergies as of 02/18/2022    (No Known Allergies)            The following portions of the patient's history were reviewed and updated as appropriate: allergies, current medications, past family history, past medical history, past social history, past surgical history and problem list      Past Medical History:   Diagnosis Date    Anxiety     no meds during pregnancy    Depression     no meds during pregnancy    Migraines     Miscarriage     Varicella     IMMUNE       History reviewed  No pertinent surgical history      Family History   Problem Relation Age of Onset   Guille Candelaria Breast cancer Mother 36    Ovarian cancer Mother     Depression Mother     Diabetes Mother     Kidney cancer Mother     Anxiety disorder Mother     Hypertension Father     Other Father         glycogen storage disease due to acid maltase deficiency -infusions weekly    Diabetes Maternal Aunt     Hypertension Maternal Grandmother     Kidney cancer Maternal Grandfather     Kidney cancer Other     Brain cancer Other          Medications have been verified  Objective   /64   Pulse 83   Temp 97 9 °F (36 6 °C)   Resp 18   Ht 5' 5" (1 651 m)   Wt 72 6 kg (160 lb)   SpO2 98%   BMI 26 63 kg/m²        Physical Exam     Physical Exam  Vitals and nursing note reviewed  Constitutional:       Appearance: She is well-developed  Cardiovascular:      Rate and Rhythm: Normal rate and regular rhythm  Heart sounds: Normal heart sounds  Pulmonary:      Effort: Pulmonary effort is normal       Breath sounds: Normal breath sounds  No wheezing  Abdominal:      General: Bowel sounds are normal       Palpations: Abdomen is soft  Tenderness: There is abdominal tenderness in the right upper quadrant, epigastric area and periumbilical area  There is no right CVA tenderness, left CVA tenderness, guarding or rebound  Skin:     Capillary Refill: Capillary refill takes less than 2 seconds  Neurological:      Mental Status: She is alert and oriented to person, place, and time     Psychiatric:         Behavior: Behavior normal

## 2022-02-18 NOTE — Clinical Note
Aleshia Gonzales was seen and treated in our emergency department on 2/18/2022  No restrictions            Diagnosis:     Josias Bingham  may return to work on return date  She may return on this date: 02/19/2022    Please excuse Aleshia Gonzales from work on 2/18 due to workup in the ED  If you have any questions or concerns, please don't hesitate to call        Roxy Bravo MD    ______________________________           _______________          _______________  Hospital Representative                              Date                                Time

## 2022-02-18 NOTE — PATIENT INSTRUCTIONS
Patient would like to go via private vehicle to [facility] Emergency Department for further evaluation, workup and treatment

## 2022-02-19 NOTE — ED PROVIDER NOTES
History  Chief Complaint   Patient presents with    Epigastric Pain     patient c/o epigastric pain since yesterday  eating makes pain worse  pt took midol, ibuprofen, and pepto without relief  was seen at urgent care and instructed to come to ER for US of gallbladder  pain increases with palpation and also slightly radiates to R side  71-year-old female with past medical history of anxiety, depression, dysmenorrhea presents for evaluation of epigastric abdominal pain that started around 1:00 p m  Yesterday after eating lunch  Patient reports eating as well as lying flat exacerbates pain which she describes with initially constant yesterday but evolved into a waxing/waning pain throughout today  Patient describes pain itself as a cramping/adán pain with associated nausea however denies any vomiting, diarrhea, fevers/chills, chest pain, dysuria/hematuria, vaginal discharge or any other symptoms  Patient reports she is sexually active however profusely denies chance of pregnancy as she reports she is currently on her menstrual cycle which is normal for her  Denies any recent sick contacts and denies any previous history of similar abdominal pain  No other concerns  Prior to Admission Medications   Prescriptions Last Dose Informant Patient Reported? Taking? SUMAtriptan (IMITREX) 100 mg tablet   No No   Sig: TAKE 1 TABLET (100 MG TOTAL) BY MOUTH ONCE AS NEEDED FOR MIGRAINE FOR UP TO 1 DOSE   Vestura 3-0 02 MG per tablet   No No   Sig: TAKE 1 TABLET BY MOUTH EVERY DAY      Facility-Administered Medications: None       Past Medical History:   Diagnosis Date    Anxiety     no meds during pregnancy    Depression     no meds during pregnancy    Migraines     Miscarriage     Varicella     IMMUNE       History reviewed  No pertinent surgical history      Family History   Problem Relation Age of Onset    Breast cancer Mother 36    Ovarian cancer Mother     Depression Mother     Diabetes Mother     Kidney cancer Mother     Anxiety disorder Mother     Hypertension Father     Other Father         glycogen storage disease due to acid maltase deficiency -infusions weekly    Diabetes Maternal Aunt     Hypertension Maternal Grandmother     Kidney cancer Maternal Grandfather     Kidney cancer Other     Brain cancer Other      I have reviewed and agree with the history as documented  E-Cigarette/Vaping    E-Cigarette Use Current Every Day User      E-Cigarette/Vaping Substances    THC Yes     CBD Yes      Social History     Tobacco Use    Smoking status: Former Smoker     Types: Cigarettes     Quit date:      Years since quittin 1    Smokeless tobacco: Never Used   Vaping Use    Vaping Use: Every day    Substances: THC, CBD   Substance Use Topics    Alcohol use: Yes     Comment: social- Rare    Drug use: Yes     Types: Marijuana     Comment: Vape        Review of Systems   Constitutional: Negative for chills and fever  HENT: Negative  Eyes: Negative  Respiratory: Negative  Cardiovascular: Negative  Gastrointestinal: Positive for abdominal pain and nausea  Negative for blood in stool, diarrhea and vomiting  Endocrine: Negative  Genitourinary: Negative for dysuria and vaginal discharge  Musculoskeletal: Negative  Skin: Negative  Allergic/Immunologic: Negative  Neurological: Negative  Hematological: Negative  Psychiatric/Behavioral: Negative  All other systems reviewed and are negative        Physical Exam  ED Triage Vitals   Temperature Pulse Respirations Blood Pressure SpO2   22 1904 22 19022 1904 22 190   98 3 °F (36 8 °C) 76 18 120/71 99 %      Temp Source Heart Rate Source Patient Position - Orthostatic VS BP Location FiO2 (%)   22 -- -- -- --   Oral          Pain Score       22       7             Orthostatic Vital Signs  Vitals:    22   BP: 120/71   Pulse: 76 Physical Exam  Vitals and nursing note reviewed  Constitutional:       General: She is not in acute distress  Appearance: Normal appearance  She is not ill-appearing, toxic-appearing or diaphoretic  HENT:      Head: Normocephalic and atraumatic  Right Ear: External ear normal       Left Ear: External ear normal       Nose: Nose normal       Mouth/Throat:      Mouth: Mucous membranes are moist    Eyes:      Extraocular Movements: Extraocular movements intact  Conjunctiva/sclera: Conjunctivae normal       Pupils: Pupils are equal, round, and reactive to light  Cardiovascular:      Rate and Rhythm: Normal rate and regular rhythm  Pulses: Normal pulses  Heart sounds: Normal heart sounds  Pulmonary:      Effort: Pulmonary effort is normal  No respiratory distress  Breath sounds: Normal breath sounds  No wheezing or rales  Abdominal:      General: Abdomen is flat  Bowel sounds are normal       Palpations: Abdomen is soft  Tenderness: There is abdominal tenderness  There is no right CVA tenderness, left CVA tenderness, guarding or rebound  Comments: Negative Mai's point tenderness, negative migraines point tenderness, negative Rovsing sign  Musculoskeletal:         General: Normal range of motion  Cervical back: Normal range of motion  Right lower leg: No edema  Left lower leg: No edema  Skin:     General: Skin is warm and dry  Capillary Refill: Capillary refill takes less than 2 seconds  Neurological:      General: No focal deficit present  Mental Status: She is alert and oriented to person, place, and time     Psychiatric:         Mood and Affect: Mood normal          Behavior: Behavior normal          ED Medications  Medications   aluminum-magnesium hydroxide-simethicone (MYLANTA) oral suspension 30 mL (30 mL Oral Given 2/18/22 2112)   Lidocaine Viscous HCl (XYLOCAINE) 2 % mucosal solution 15 mL (15 mL Swish & Spit Given 2/18/22 2112)       Diagnostic Studies  Results Reviewed     Procedure Component Value Units Date/Time    Comprehensive metabolic panel [[id number]]  (Abnormal) Collected: 02/18/22 1908    Lab Status: Final result Specimen: Blood from Arm, Right Updated: 02/18/22 1950     Sodium 140 mmol/L      Potassium 3 2 mmol/L      Chloride 104 mmol/L      CO2 28 mmol/L      ANION GAP 8 mmol/L      BUN 8 mg/dL      Creatinine 0 77 mg/dL      Glucose 94 mg/dL      Calcium 9 0 mg/dL      AST 13 U/L      ALT 18 U/L      Alkaline Phosphatase 62 U/L      Total Protein 8 0 g/dL      Albumin 4 0 g/dL      Total Bilirubin 0 21 mg/dL      eGFR 107 ml/min/1 73sq m     Narrative:      National Kidney Disease Foundation guidelines for Chronic Kidney Disease (CKD):     Stage 1 with normal or high GFR (GFR > 90 mL/min/1 73 square meters)    Stage 2 Mild CKD (GFR = 60-89 mL/min/1 73 square meters)    Stage 3A Moderate CKD (GFR = 45-59 mL/min/1 73 square meters)    Stage 3B Moderate CKD (GFR = 30-44 mL/min/1 73 square meters)    Stage 4 Severe CKD (GFR = 15-29 mL/min/1 73 square meters)    Stage 5 End Stage CKD (GFR <15 mL/min/1 73 square meters)  Note: GFR calculation is accurate only with a steady state creatinine    Lipase [[id number]]  (Normal) Collected: 02/18/22 1908    Lab Status: Final result Specimen: Blood from Arm, Right Updated: 02/18/22 1950     Lipase 115 u/L     CBC and differential [[id number]] Collected: 02/18/22 1908    Lab Status: Final result Specimen: Blood from Arm, Right Updated: 02/18/22 1935     WBC 6 48 Thousand/uL      RBC 3 94 Million/uL      Hemoglobin 12 3 g/dL      Hematocrit 37 0 %      MCV 94 fL      MCH 31 2 pg      MCHC 33 2 g/dL      RDW 12 8 %      MPV 11 2 fL      Platelets 302 Thousands/uL      nRBC 0 /100 WBCs      Neutrophils Relative 50 %      Immat GRANS % 0 %      Lymphocytes Relative 38 %      Monocytes Relative 10 %      Eosinophils Relative 1 %      Basophils Relative 1 %      Neutrophils Absolute 3 25 Thousands/µL      Immature Grans Absolute 0 02 Thousand/uL      Lymphocytes Absolute 2 45 Thousands/µL      Monocytes Absolute 0 64 Thousand/µL      Eosinophils Absolute 0 09 Thousand/µL      Basophils Absolute 0 03 Thousands/µL                  No orders to display         Procedures  Procedures      ED Course                                       MDM  Number of Diagnoses or Management Options  Abdominal pain, acute, epigastric  Acute superficial gastritis without hemorrhage  Diagnosis management comments: 69-year-old female with past medical history of anxiety, depression, dysmenorrhea presents for evaluation of epigastric abdominal pain that started around 1:00 p m  Yesterday after eating lunch  Patient remained stable throughout ED course  She was offered pain medicine as well as Zofran however patient declined  Ultimately patient was given Mylanta and viscous lidocaine for symptomatic control which patient accepted  CBC, CMP, lipase all within normal limits and reassuring  Physical exam showed very mild epigastric tenderness without rebound or guarding and plan ED attending Dr Germania Vidal examined patient, patient reported that palpation did not worsen abdominal pain whatsoever  Patient appeared comfortable at rest overall was discharged home with Rx Protonix as this is likely due to gastritis  Patient was instructed to present/return to ER for any worsening concerns with follow-up with PCP as needed  All questions answered  No other concerns         Amount and/or Complexity of Data Reviewed  Tests in the radiology section of CPT®: ordered and reviewed    Risk of Complications, Morbidity, and/or Mortality  Presenting problems: moderate  Diagnostic procedures: moderate  Management options: moderate    Patient Progress  Patient progress: stable      Disposition  Final diagnoses:   Acute superficial gastritis without hemorrhage   Abdominal pain, acute, epigastric     Time reflects when diagnosis was documented in both MDM as applicable and the Disposition within this note     Time User Action Codes Description Comment    2/18/2022  8:38 PM Sarai ZABALA Add [K29 00] Acute superficial gastritis without hemorrhage     2/18/2022  8:38 PM Eric Byrnemeredithadolfoon Add [R10 13] Abdominal pain, acute, epigastric       ED Disposition     ED Disposition Condition Date/Time Comment    Discharge Stable Fri Feb 18, 2022  8:37 PM Nayeli Jon discharge to home/self care  Follow-up Information     Follow up With Specialties Details Why Contact Info    Bryce Garcia MD Family Medicine Call in 1 day  [address]  [phone] [address]  [phone]            Discharge Medication List as of 2/18/2022  9:00 PM      START taking these medications    Details   pantoprazole (PROTONIX) 40 mg tablet Take 1 tablet (40 mg total) by mouth daily, Starting Fri 2/18/2022, Normal         CONTINUE these medications which have NOT CHANGED    Details   SUMAtriptan (IMITREX) 100 mg tablet TAKE 1 TABLET (100 MG TOTAL) BY MOUTH ONCE AS NEEDED FOR MIGRAINE FOR UP TO 1 DOSE, Starting Tue 7/20/2021, Normal      Vestura 3-0 02 MG per tablet TAKE 1 TABLET BY MOUTH EVERY DAY, Normal           No discharge procedures on file  PDMP Review     None           ED Provider  Attending physically available and evaluated Shannan Hidalgojose HO managed the patient along with the ED Attending      Electronically Signed by         Clayton Bunch MD  02/18/22 1688

## 2022-02-19 NOTE — ED ATTENDING ATTESTATION
2/18/2022  IWinnie DO, saw and evaluated the patient  I have discussed the patient with the resident/non-physician practitioner and agree with the resident's/non-physician practitioner's findings, Plan of Care, and MDM as documented in the resident's/non-physician practitioner's note, except where noted  All available labs and Radiology studies were reviewed  I was present for key portions of any procedure(s) performed by the resident/non-physician practitioner and I was immediately available to provide assistance  At this point I agree with the current assessment done in the Emergency Department  I have conducted an independent evaluation of this patient a history and physical is as follows:        21-year-old female, upper abdominal pain, crampy, mainly in the epigastric region, comes in waves  , worse after eating and when lying flat  Currently it is a 1/10 at its worst an 8/10 , has had similar pains the past but no exact etiology was found  , patient gets her menses regularly is currently on her menstrual cycle    Review of Systems   Constitutional: Negative for activity change, chills, diaphoresis and fever  HENT: Negative for congestion, sinus pressure and sore throat  Eyes: Negative for pain and visual disturbance  Respiratory: Negative for cough, chest tightness, shortness of breath, wheezing and stridor  Cardiovascular: Negative for chest pain and palpitations  Gastrointestinal: Negative for abdominal distention,  constipation, diarrhea, nausea and vomiting  Genitourinary: Negative for dysuria and frequency  Musculoskeletal: Negative for neck pain and neck stiffness  Skin: Negative for rash  Neurological: Negative for dizziness, speech difficulty, light-headedness, numbness and headaches  Physical Exam  Vitals reviewed  Constitutional:       General: She is not in acute distress  Appearance: She is well-developed  She is not diaphoretic     HENT:      Head: Normocephalic and atraumatic  Right Ear: External ear normal       Left Ear: External ear normal       Nose: Nose normal    Eyes:      General:         Right eye: No discharge  Left eye: No discharge  Pupils: Pupils are equal, round, and reactive to light  Neck:      Trachea: No tracheal deviation  Cardiovascular:      Rate and Rhythm: Normal rate and regular rhythm  Heart sounds: Normal heart sounds  No murmur heard  Pulmonary:      Effort: Pulmonary effort is normal  No respiratory distress  Breath sounds: Normal breath sounds  No stridor  Abdominal:      General: There is no distension  Palpations: Abdomen is soft  Tenderness: There is abdominal tenderness (epigastric)  There is no guarding or rebound  Musculoskeletal:         General: Normal range of motion  Cervical back: Normal range of motion and neck supple  Skin:     General: Skin is warm and dry  Coloration: Skin is not pale  Findings: No erythema  Neurological:      General: No focal deficit present  Mental Status: She is alert and oriented to person, place, and time  ED Course         Critical Care Time  Procedures          Labs Reviewed   COMPREHENSIVE METABOLIC PANEL - Abnormal       Result Value Ref Range Status    Sodium 140  136 - 145 mmol/L Final    Potassium 3 2 (*) 3 5 - 5 3 mmol/L Final    Chloride 104  100 - 108 mmol/L Final    CO2 28  21 - 32 mmol/L Final    ANION GAP 8  4 - 13 mmol/L Final    BUN 8  5 - 25 mg/dL Final    Creatinine 0 77  0 60 - 1 30 mg/dL Final    Comment: Standardized to IDMS reference method    Glucose 94  65 - 140 mg/dL Final    Comment: If the patient is fasting, the ADA then defines impaired fasting glucose as > 100 mg/dL and diabetes as > or equal to 123 mg/dL  Specimen collection should occur prior to Sulfasalazine administration due to the potential for falsely depressed results   Specimen collection should occur prior to Sulfapyridine administration due to the potential for falsely elevated results  Calcium 9 0  8 3 - 10 1 mg/dL Final    AST 13  5 - 45 U/L Final    Comment: Specimen collection should occur prior to Sulfasalazine administration due to the potential for falsely depressed results  ALT 18  12 - 78 U/L Final    Comment: Specimen collection should occur prior to Sulfasalazine administration due to the potential for falsely depressed results  Alkaline Phosphatase 62  46 - 116 U/L Final    Total Protein 8 0  6 4 - 8 2 g/dL Final    Albumin 4 0  3 5 - 5 0 g/dL Final    Total Bilirubin 0 21  0 20 - 1 00 mg/dL Final    Comment: Use of this assay is not recommended for patients undergoing treatment with eltrombopag due to the potential for falsely elevated results      eGFR 107  ml/min/1 73sq m Final    Narrative:     National Kidney Disease Foundation guidelines for Chronic Kidney Disease (CKD):     Stage 1 with normal or high GFR (GFR > 90 mL/min/1 73 square meters)    Stage 2 Mild CKD (GFR = 60-89 mL/min/1 73 square meters)    Stage 3A Moderate CKD (GFR = 45-59 mL/min/1 73 square meters)    Stage 3B Moderate CKD (GFR = 30-44 mL/min/1 73 square meters)    Stage 4 Severe CKD (GFR = 15-29 mL/min/1 73 square meters)    Stage 5 End Stage CKD (GFR <15 mL/min/1 73 square meters)  Note: GFR calculation is accurate only with a steady state creatinine   LIPASE - Normal    Lipase 115  73 - 393 u/L Final   CBC AND DIFFERENTIAL    WBC 6 48  4 31 - 10 16 Thousand/uL Final    RBC 3 94  3 81 - 5 12 Million/uL Final    Hemoglobin 12 3  11 5 - 15 4 g/dL Final    Hematocrit 37 0  34 8 - 46 1 % Final    MCV 94  82 - 98 fL Final    MCH 31 2  26 8 - 34 3 pg Final    MCHC 33 2  31 4 - 37 4 g/dL Final    RDW 12 8  11 6 - 15 1 % Final    MPV 11 2  8 9 - 12 7 fL Final    Platelets 014  175 - 390 Thousands/uL Final    nRBC 0  /100 WBCs Final    Neutrophils Relative 50  43 - 75 % Final    Immat GRANS % 0  0 - 2 % Final    Lymphocytes Relative 38  14 - 44 % Final    Monocytes Relative 10  4 - 12 % Final    Eosinophils Relative 1  0 - 6 % Final    Basophils Relative 1  0 - 1 % Final    Neutrophils Absolute 3 25  1 85 - 7 62 Thousands/µL Final    Immature Grans Absolute 0 02  0 00 - 0 20 Thousand/uL Final    Lymphocytes Absolute 2 45  0 60 - 4 47 Thousands/µL Final    Monocytes Absolute 0 64  0 17 - 1 22 Thousand/µL Final    Eosinophils Absolute 0 09  0 00 - 0 61 Thousand/µL Final    Basophils Absolute 0 03  0 00 - 0 10 Thousands/µL Final         No orders to display         MDM  Number of Diagnoses or Management Options  Abdominal pain, acute, epigastric: new, needed workup  Acute superficial gastritis without hemorrhage: new, needed workup  Diagnosis management comments: Nontender abdomen on examination  , possibly gastritis, normal blood work normal bedside ultrasound  , will discharge, if symptoms worsen she agrees return to ED       Amount and/or Complexity of Data Reviewed  Clinical lab tests: ordered and reviewed

## 2022-03-02 ENCOUNTER — OFFICE VISIT (OUTPATIENT)
Dept: FAMILY MEDICINE CLINIC | Facility: CLINIC | Age: 27
End: 2022-03-02
Payer: COMMERCIAL

## 2022-03-02 VITALS
TEMPERATURE: 99 F | BODY MASS INDEX: 26.82 KG/M2 | SYSTOLIC BLOOD PRESSURE: 104 MMHG | HEART RATE: 70 BPM | DIASTOLIC BLOOD PRESSURE: 66 MMHG | WEIGHT: 161 LBS | OXYGEN SATURATION: 97 % | HEIGHT: 65 IN | RESPIRATION RATE: 16 BRPM

## 2022-03-02 DIAGNOSIS — F31.81 BIPOLAR 2 DISORDER (HCC): ICD-10-CM

## 2022-03-02 DIAGNOSIS — Z11.59 NEED FOR HEPATITIS C SCREENING TEST: ICD-10-CM

## 2022-03-02 DIAGNOSIS — R53.83 OTHER FATIGUE: Primary | ICD-10-CM

## 2022-03-02 DIAGNOSIS — Z00.00 ANNUAL PHYSICAL EXAM: ICD-10-CM

## 2022-03-02 DIAGNOSIS — M54.2 NECK DISCOMFORT: ICD-10-CM

## 2022-03-02 PROCEDURE — 1036F TOBACCO NON-USER: CPT | Performed by: FAMILY MEDICINE

## 2022-03-02 PROCEDURE — 99395 PREV VISIT EST AGE 18-39: CPT | Performed by: FAMILY MEDICINE

## 2022-03-02 PROCEDURE — 3008F BODY MASS INDEX DOCD: CPT | Performed by: FAMILY MEDICINE

## 2022-03-02 PROCEDURE — 3725F SCREEN DEPRESSION PERFORMED: CPT | Performed by: FAMILY MEDICINE

## 2022-03-02 NOTE — PROGRESS NOTES
Assessment/Plan:         Problem List Items Addressed This Visit        Other    Annual physical exam     Check routine labs           Neck discomfort     Check xray of neck if ok PT         Other fatigue - Primary     Check labs         Bipolar 2 disorder ([address] )     On no meds feels ok now will be looking for new psychiatrist           Other Visit Diagnoses     Need for hepatitis C screening test                Subjective: pt here for physical pt   Had been seeing  a chiropracter  for neck pain feels like the muscles are tight   Also has fatigue  No depression although states she was diagnosed by psychiatrist with bipolar 2     Patient ID: Shaji Xiong is a 23 y o  female  HPI    The following portions of the patient's history were reviewed and updated as appropriate:   Past Medical History:  She has a past medical history of Anxiety, Depression, Migraines, Miscarriage, and Varicella  ,  _______________________________________________________________________  Medical Problems:  does not have any pertinent problems on file ,  _______________________________________________________________________  Past Surgical History:   has no past surgical history on file ,  _______________________________________________________________________  Family History:  family history includes Anxiety disorder in her brother and mother; Brain cancer in her other; Breast cancer (age of onset: 36) in her mother; Depression in her mother; Diabetes in her maternal aunt and mother; Hypertension in her father and maternal grandmother; Kidney cancer in her maternal grandfather, mother, and other; No Known Problems in her son; Other in her father; Ovarian cancer in her mother ,  _______________________________________________________________________  Social History:   reports that she quit smoking about 6 years ago  Her smoking use included cigarettes  She has never used smokeless tobacco  She reports current alcohol use   She reports current drug use  Drug: Marijuana  ,  _______________________________________________________________________  Allergies:  has No Known Allergies     _______________________________________________________________________  Current Outpatient Medications   Medication Sig Dispense Refill    pantoprazole (PROTONIX) 40 mg tablet Take 1 tablet (40 mg total) by mouth daily 30 tablet 0     No current facility-administered medications for this visit      _______________________________________________________________________  Review of Systems   Constitutional: Positive for fatigue  Negative for appetite change, chills and fever  Respiratory: Negative for cough, chest tightness and shortness of breath  Cardiovascular: Negative for chest pain, palpitations and leg swelling  Gastrointestinal: Negative for abdominal pain, constipation, diarrhea, nausea and vomiting  Genitourinary: Negative for difficulty urinating and frequency  Musculoskeletal: Positive for neck pain  Negative for arthralgias and back pain  Skin: Negative for rash  acne   Neurological: Negative for dizziness, weakness, light-headedness, numbness and headaches  Hematological: Does not bruise/bleed easily  Psychiatric/Behavioral: Negative for dysphoric mood and sleep disturbance  The patient is not nervous/anxious  Objective:  Vitals:    03/02/22 0933   BP: 104/66   BP Location: Left arm   Patient Position: Sitting   Cuff Size: Standard   Pulse: 70   Resp: 16   Temp: 99 °F (37 2 °C)   TempSrc: Temporal   SpO2: 97%   Weight: 73 kg (161 lb)   Height: 5' 5" (1 651 m)     Body mass index is 26 79 kg/m²  Physical Exam  Vitals reviewed  Constitutional:       General: She is not in acute distress  Appearance: Normal appearance  She is well-developed  She is not ill-appearing  HENT:      Head: Normocephalic        Right Ear: Tympanic membrane, ear canal and external ear normal       Left Ear: Tympanic membrane, ear canal and external ear normal       Nose: Nose normal       Mouth/Throat:      Mouth: Mucous membranes are moist       Pharynx: No oropharyngeal exudate  Eyes:      General: Lids are normal  No scleral icterus  Extraocular Movements: Extraocular movements intact  Conjunctiva/sclera: Conjunctivae normal       Pupils: Pupils are equal, round, and reactive to light  Neck:      Thyroid: No thyromegaly  Vascular: No carotid bruit  Cardiovascular:      Rate and Rhythm: Normal rate and regular rhythm  Pulses: Normal pulses  Heart sounds: Normal heart sounds  No murmur heard  No friction rub  Pulmonary:      Effort: Pulmonary effort is normal       Breath sounds: Normal breath sounds  No wheezing, rhonchi or rales  Abdominal:      General: Bowel sounds are normal  There is no distension  Palpations: Abdomen is soft  There is no mass  Tenderness: There is no abdominal tenderness  There is no guarding  Hernia: No hernia is present  Musculoskeletal:         General: Tenderness (cervical paraspinal muscles tender to palpation no spinous tenderness) present  Normal range of motion  Cervical back: Normal range of motion and neck supple  Lymphadenopathy:      Cervical: No cervical adenopathy  Skin:     General: Skin is warm and dry  Findings: No rash  Comments: No abnormal appearing moles   Neurological:      General: No focal deficit present  Mental Status: She is alert and oriented to person, place, and time  Mental status is at baseline  Cranial Nerves: No cranial nerve deficit  Sensory: No sensory deficit  Motor: No weakness, tremor or abnormal muscle tone  Coordination: Coordination normal       Gait: Gait normal       Deep Tendon Reflexes: Reflexes normal    Psychiatric:         Mood and Affect: Mood normal          Speech: Speech normal          Behavior: Behavior normal        BMI Counseling: Body mass index is 26 79 kg/m²   The BMI is above normal  Nutrition recommendations include 3-5 servings of fruits/vegetables daily, reducing fast food intake, consuming healthier snacks, decreasing soda and/or juice intake, moderation in carbohydrate intake and increasing intake of lean protein  Exercise recommendations include exercising 3-5 times per week and strength training exercises

## 2022-03-07 DIAGNOSIS — B37.9 YEAST INFECTION: Primary | ICD-10-CM

## 2022-03-07 RX ORDER — FLUCONAZOLE 150 MG/1
150 TABLET ORAL EVERY OTHER DAY
Qty: 2 TABLET | Refills: 0 | Status: SHIPPED | OUTPATIENT
Start: 2022-03-07 | End: 2022-03-10

## 2022-03-07 NOTE — TELEPHONE ENCOUNTER
Patient called in c/o heavy white discharge, irritation and itching  Requesting medication to be sent to pharmacy  Aware will send and should be there later today

## 2022-03-09 ENCOUNTER — EVALUATION (OUTPATIENT)
Dept: PHYSICAL THERAPY | Facility: CLINIC | Age: 27
End: 2022-03-09
Payer: COMMERCIAL

## 2022-03-09 ENCOUNTER — APPOINTMENT (OUTPATIENT)
Dept: LAB | Facility: CLINIC | Age: 27
End: 2022-03-09
Payer: COMMERCIAL

## 2022-03-09 DIAGNOSIS — G89.29 CHRONIC INTRACTABLE HEADACHE, UNSPECIFIED HEADACHE TYPE: ICD-10-CM

## 2022-03-09 DIAGNOSIS — M54.2 NECK DISCOMFORT: ICD-10-CM

## 2022-03-09 DIAGNOSIS — G43.109 MIGRAINE WITH AURA AND WITHOUT STATUS MIGRAINOSUS, NOT INTRACTABLE: Primary | ICD-10-CM

## 2022-03-09 DIAGNOSIS — R53.83 FATIGUE, UNSPECIFIED TYPE: ICD-10-CM

## 2022-03-09 DIAGNOSIS — L70.0 ACNE VULGARIS: ICD-10-CM

## 2022-03-09 DIAGNOSIS — Z00.00 ANNUAL PHYSICAL EXAM: ICD-10-CM

## 2022-03-09 DIAGNOSIS — R53.83 OTHER FATIGUE: ICD-10-CM

## 2022-03-09 DIAGNOSIS — R51.9 CHRONIC INTRACTABLE HEADACHE, UNSPECIFIED HEADACHE TYPE: ICD-10-CM

## 2022-03-09 DIAGNOSIS — Z11.59 NEED FOR HEPATITIS C SCREENING TEST: ICD-10-CM

## 2022-03-09 LAB
25(OH)D3 SERPL-MCNC: 26.9 NG/ML (ref 30–100)
ALBUMIN SERPL BCP-MCNC: 4.2 G/DL (ref 3.5–5)
ALP SERPL-CCNC: 69 U/L (ref 46–116)
ALT SERPL W P-5'-P-CCNC: 33 U/L (ref 12–78)
ANION GAP SERPL CALCULATED.3IONS-SCNC: 5 MMOL/L (ref 4–13)
AST SERPL W P-5'-P-CCNC: 17 U/L (ref 5–45)
BASOPHILS # BLD AUTO: 0.03 THOUSANDS/ΜL (ref 0–0.1)
BASOPHILS NFR BLD AUTO: 1 % (ref 0–1)
BILIRUB SERPL-MCNC: 0.62 MG/DL (ref 0.2–1)
BUN SERPL-MCNC: 12 MG/DL (ref 5–25)
CALCIUM SERPL-MCNC: 10 MG/DL (ref 8.3–10.1)
CHLORIDE SERPL-SCNC: 105 MMOL/L (ref 100–108)
CHOLEST SERPL-MCNC: 180 MG/DL
CO2 SERPL-SCNC: 28 MMOL/L (ref 21–32)
CREAT SERPL-MCNC: 0.81 MG/DL (ref 0.6–1.3)
EOSINOPHIL # BLD AUTO: 0.1 THOUSAND/ΜL (ref 0–0.61)
EOSINOPHIL NFR BLD AUTO: 2 % (ref 0–6)
ERYTHROCYTE [DISTWIDTH] IN BLOOD BY AUTOMATED COUNT: 12.6 % (ref 11.6–15.1)
FERRITIN SERPL-MCNC: 11 NG/ML (ref 8–388)
GFR SERPL CREATININE-BSD FRML MDRD: 101 ML/MIN/1.73SQ M
GLUCOSE P FAST SERPL-MCNC: 88 MG/DL (ref 65–99)
HCT VFR BLD AUTO: 38 % (ref 34.8–46.1)
HCV AB SER QL: NORMAL
HDLC SERPL-MCNC: 73 MG/DL
HGB BLD-MCNC: 12.3 G/DL (ref 11.5–15.4)
IMM GRANULOCYTES # BLD AUTO: 0.01 THOUSAND/UL (ref 0–0.2)
IMM GRANULOCYTES NFR BLD AUTO: 0 % (ref 0–2)
IRON SATN MFR SERPL: 45 % (ref 15–50)
IRON SERPL-MCNC: 185 UG/DL (ref 50–170)
LDLC SERPL CALC-MCNC: 98 MG/DL (ref 0–100)
LYMPHOCYTES # BLD AUTO: 1.51 THOUSANDS/ΜL (ref 0.6–4.47)
LYMPHOCYTES NFR BLD AUTO: 29 % (ref 14–44)
MCH RBC QN AUTO: 30.5 PG (ref 26.8–34.3)
MCHC RBC AUTO-ENTMCNC: 32.4 G/DL (ref 31.4–37.4)
MCV RBC AUTO: 94 FL (ref 82–98)
MONOCYTES # BLD AUTO: 0.49 THOUSAND/ΜL (ref 0.17–1.22)
MONOCYTES NFR BLD AUTO: 10 % (ref 4–12)
NEUTROPHILS # BLD AUTO: 3.02 THOUSANDS/ΜL (ref 1.85–7.62)
NEUTS SEG NFR BLD AUTO: 58 % (ref 43–75)
NONHDLC SERPL-MCNC: 107 MG/DL
NRBC BLD AUTO-RTO: 0 /100 WBCS
PLATELET # BLD AUTO: 240 THOUSANDS/UL (ref 149–390)
PMV BLD AUTO: 11.2 FL (ref 8.9–12.7)
POTASSIUM SERPL-SCNC: 4.1 MMOL/L (ref 3.5–5.3)
PROT SERPL-MCNC: 8.6 G/DL (ref 6.4–8.2)
RBC # BLD AUTO: 4.03 MILLION/UL (ref 3.81–5.12)
SODIUM SERPL-SCNC: 138 MMOL/L (ref 136–145)
T4 FREE SERPL-MCNC: 0.89 NG/DL (ref 0.76–1.46)
TIBC SERPL-MCNC: 409 UG/DL (ref 250–450)
TRIGL SERPL-MCNC: 46 MG/DL
TSH SERPL DL<=0.05 MIU/L-ACNC: 1.73 UIU/ML (ref 0.36–3.74)
WBC # BLD AUTO: 5.16 THOUSAND/UL (ref 4.31–10.16)

## 2022-03-09 PROCEDURE — 97110 THERAPEUTIC EXERCISES: CPT | Performed by: PHYSICAL THERAPIST

## 2022-03-09 PROCEDURE — 83550 IRON BINDING TEST: CPT

## 2022-03-09 PROCEDURE — 84439 ASSAY OF FREE THYROXINE: CPT

## 2022-03-09 PROCEDURE — 84443 ASSAY THYROID STIM HORMONE: CPT

## 2022-03-09 PROCEDURE — 83540 ASSAY OF IRON: CPT

## 2022-03-09 PROCEDURE — 85025 COMPLETE CBC W/AUTO DIFF WBC: CPT

## 2022-03-09 PROCEDURE — 86803 HEPATITIS C AB TEST: CPT

## 2022-03-09 PROCEDURE — 36415 COLL VENOUS BLD VENIPUNCTURE: CPT

## 2022-03-09 PROCEDURE — 82728 ASSAY OF FERRITIN: CPT

## 2022-03-09 PROCEDURE — 97162 PT EVAL MOD COMPLEX 30 MIN: CPT | Performed by: PHYSICAL THERAPIST

## 2022-03-09 PROCEDURE — 80053 COMPREHEN METABOLIC PANEL: CPT

## 2022-03-09 PROCEDURE — 82306 VITAMIN D 25 HYDROXY: CPT

## 2022-03-09 PROCEDURE — 80061 LIPID PANEL: CPT
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
## 2022-03-15 ENCOUNTER — OFFICE VISIT (OUTPATIENT)
Dept: PHYSICAL THERAPY | Facility: CLINIC | Age: 27
End: 2022-03-15
Payer: COMMERCIAL

## 2022-03-15 DIAGNOSIS — M54.2 NECK DISCOMFORT: Primary | ICD-10-CM

## 2022-03-15 DIAGNOSIS — G43.109 MIGRAINE WITH AURA AND WITHOUT STATUS MIGRAINOSUS, NOT INTRACTABLE: ICD-10-CM

## 2022-03-15 DIAGNOSIS — G89.29 CHRONIC INTRACTABLE HEADACHE, UNSPECIFIED HEADACHE TYPE: ICD-10-CM

## 2022-03-15 DIAGNOSIS — R51.9 CHRONIC INTRACTABLE HEADACHE, UNSPECIFIED HEADACHE TYPE: ICD-10-CM

## 2022-03-15 PROCEDURE — 97530 THERAPEUTIC ACTIVITIES: CPT | Performed by: PHYSICAL THERAPIST

## 2022-03-15 PROCEDURE — 97140 MANUAL THERAPY 1/> REGIONS: CPT | Performed by: PHYSICAL THERAPIST

## 2022-03-15 PROCEDURE — 97112 NEUROMUSCULAR REEDUCATION: CPT | Performed by: PHYSICAL THERAPIST

## 2022-03-15 NOTE — PROGRESS NOTES
Daily Note     Today's date: 3/15/2022  Patient name: Janna Guzman  : 1995  MRN: [id number]  Referring provider: Matti Epperson MD  Dx:   Encounter Diagnosis     ICD-10-CM    1  Neck discomfort  M54 2    2  Migraine with aura and without status migrainosus, not intractable  G43 109    3  Chronic intractable headache, unspecified headache type  R51 9     G89 29                   Subjective: Pt reports she was tender after her appt though it felt good  She states she has been compliant with HEP  Objective: See treatment diary below      Assessment: Tolerated treatment well  Patient exhibited good technique with therapeutic exercises and would benefit from continued PT  She reports increased stretch on R>L side with added c/s stretching  No pain noted with manuals or added exercises today  Plan: Progress treatment as tolerated  Precautions: None       Discussed use of hot pack for improving muscle tension-review NV?   Manuals 3/9 3/15           C/s stretching NV 5'           UT/levator STM 4' 5'           Sub occipital release 3' 5'           Right side c/s down glides, MWM rotation              Prone t/s PA mobromán   NV                                     Neuro Re-Ed             Chin tucks  NV with stabilizor           Sustained c/s head lifts  NV           TB rows NV G Tb 2x10           TB extensions  NV G Tb 2x10           TB horizontal abd  NV           ankita single arm retraction holds                          Ther Activity             C/S towel Rotation  10x10"            C/s towel extensions  10x10"            Seated t/s extensions supine  Over maroon roll NV 10x5"                                                                             Ther Ex             UBE  2'/2'           Db shoulder flexion             DB shoulder abd              UT stretch NV 10" x5 bl           Levator stretch  NV 10" x5           Door pec stretch  NV 20" x3 Modalities

## 2022-03-17 ENCOUNTER — OFFICE VISIT (OUTPATIENT)
Dept: PHYSICAL THERAPY | Facility: CLINIC | Age: 27
End: 2022-03-17
Payer: COMMERCIAL

## 2022-03-17 DIAGNOSIS — M54.2 NECK DISCOMFORT: Primary | ICD-10-CM

## 2022-03-17 DIAGNOSIS — G89.29 CHRONIC INTRACTABLE HEADACHE, UNSPECIFIED HEADACHE TYPE: ICD-10-CM

## 2022-03-17 DIAGNOSIS — G43.109 MIGRAINE WITH AURA AND WITHOUT STATUS MIGRAINOSUS, NOT INTRACTABLE: ICD-10-CM

## 2022-03-17 DIAGNOSIS — R51.9 CHRONIC INTRACTABLE HEADACHE, UNSPECIFIED HEADACHE TYPE: ICD-10-CM

## 2022-03-17 PROCEDURE — 97530 THERAPEUTIC ACTIVITIES: CPT

## 2022-03-17 PROCEDURE — 97112 NEUROMUSCULAR REEDUCATION: CPT

## 2022-03-17 PROCEDURE — 97140 MANUAL THERAPY 1/> REGIONS: CPT

## 2022-03-17 NOTE — PROGRESS NOTES
Daily Note     Today's date: 3/17/2022  Patient name: Jt Mcpherson  : 1995  MRN: [id number]  Referring provider: Beryl Duckworth MD  Dx:   Encounter Diagnosis     ICD-10-CM    1  Neck discomfort  M54 2    2  Migraine with aura and without status migrainosus, not intractable  G43 109    3  Chronic intractable headache, unspecified headache type  R51 9     G89 29                   Subjective: Pt reports she was sore after last visit  Pt states compliance w/ current HEP, noting she performs stretches 3x/day  Pt reports intensity and frequency of HA  Objective: See treatment diary below      Assessment: Tolerated treatment well  Patient exhibited good technique with therapeutic exercises  TTP R cervical paraspinals, suboccipitals  BL UT tightness persists  Min increase in discomfort noted post tx session  Held to 5 reps w/ supine thoracic extension due to discomfort  Plan: Progress treatment as tolerated  Precautions: None       Discussed use of hot pack for improving muscle tension-review NV?   Manuals 3/9 3/15 3/17          C/s stretching NV 5' 5'          UT/levator STM 4' 5' 5'          Sub occipital release 3' 5' 5'          Right side c/s down glides, MWM rotation              Prone t/s PA mobes   NV NV                                    Neuro Re-Ed             Chin tucks  NV with stabilizor NV          Sustained c/s head lifts  NV NV          TB rows NV G Tb 2x10 GTB 2x10          TB extensions  NV G Tb 2x10 GTB 2x10          TB horizontal abd  NV RTB 2x10          ankita single arm retraction holds                          Ther Activity             C/S towel Rotation  10x10"            C/s towel extensions  10x10"            Seated t/s extensions supine  Over maroon roll NV 10x5"  5x5"                                                                           Ther Ex             UBE  2'/2' 2'/2'          Db shoulder flexion             DB shoulder abd              UT stretch NV 10" x5 bl 5x10" ea          Levator stretch  NV 10" x5 5x10" ea          Door pec stretch  NV 20" x3  20"x3                                                                           Modalities

## 2022-03-18 ENCOUNTER — TELEPHONE (OUTPATIENT)
Dept: FAMILY MEDICINE CLINIC | Facility: CLINIC | Age: 27
End: 2022-03-18

## 2022-03-18 DIAGNOSIS — K29.00 ACUTE SUPERFICIAL GASTRITIS WITHOUT HEMORRHAGE: ICD-10-CM

## 2022-03-18 RX ORDER — PANTOPRAZOLE SODIUM 40 MG/1
40 TABLET, DELAYED RELEASE ORAL DAILY
Qty: 30 TABLET | Refills: 1 | Status: SHIPPED | OUTPATIENT
Start: 2022-03-18 | End: 2022-04-25

## 2022-03-18 NOTE — TELEPHONE ENCOUNTER
Patient was in the ED about a month ago for stomach pain and was given Pantoprazole  She was going great with it and now that she ran out all the problems she is having are back again  She stated that she ate lunch today and she feels like she could vomit and double over in pain  She wanted to know if you could fill this medication  She has an appointment with you on 03/29 for the issue but would like to get the medication in the mean time    Please call when completed

## 2022-03-22 ENCOUNTER — OFFICE VISIT (OUTPATIENT)
Dept: PHYSICAL THERAPY | Facility: CLINIC | Age: 27
End: 2022-03-22
Payer: COMMERCIAL

## 2022-03-22 DIAGNOSIS — G43.109 MIGRAINE WITH AURA AND WITHOUT STATUS MIGRAINOSUS, NOT INTRACTABLE: Primary | ICD-10-CM

## 2022-03-22 DIAGNOSIS — R51.9 CHRONIC INTRACTABLE HEADACHE, UNSPECIFIED HEADACHE TYPE: ICD-10-CM

## 2022-03-22 DIAGNOSIS — G89.29 CHRONIC INTRACTABLE HEADACHE, UNSPECIFIED HEADACHE TYPE: ICD-10-CM

## 2022-03-22 DIAGNOSIS — M54.2 NECK DISCOMFORT: ICD-10-CM

## 2022-03-22 PROCEDURE — 97140 MANUAL THERAPY 1/> REGIONS: CPT | Performed by: PHYSICAL THERAPIST

## 2022-03-22 PROCEDURE — 97112 NEUROMUSCULAR REEDUCATION: CPT | Performed by: PHYSICAL THERAPIST

## 2022-03-22 PROCEDURE — 97530 THERAPEUTIC ACTIVITIES: CPT | Performed by: PHYSICAL THERAPIST

## 2022-03-22 NOTE — PROGRESS NOTES
Daily Note     Today's date: 3/22/2022  Patient name: Shannan Cervantes  : 1995  MRN: [id number]  Referring provider: Paco Nash MD  Dx:   Encounter Diagnosis     ICD-10-CM    1  Migraine with aura and without status migrainosus, not intractable  G43 109    2  Neck discomfort  M54 2    3  Chronic intractable headache, unspecified headache type  R51 9     G89 29                   Subjective: Pt reports migraines have been decreasing in intensity  She gets sore and headaches the day after treatment though feels good when she leaves PT sessions  Objective: See treatment diary below      Assessment: Tolerated treatment well  Patient exhibited good technique with therapeutic exercises  Patient tolerates exercises well without complaints today  She has tenderness with manuals though tolerates well  Focused on increased pressure with suboccipital release which pt finds relief from  No pain with added chin tucks or head lifts today though pt finds them challenging  Plan: Progress treatment as tolerated  Precautions: None       Discussed use of hot pack for improving muscle tension-review NV?   Manuals 3/9 3/15 3/17 3/22         C/s stretching NV 5' 5' 5'         UT/levator STM 4' 5' 5' 5'         Sub occipital release 3' 5' 5' 5'         Right side c/s down glides, MWM rotation              Prone t/s PA mobes   NV NV 5'                                   Neuro Re-Ed             Chin tucks  NV with stabilizor NV 10x5"   With stabilizor       Sustained c/s head lifts  NV NV 10x5"          TB rows NV G Tb 2x10 GTB 2x10 G TB 2x10         TB extensions  NV G Tb 2x10 GTB 2x10 G TB 2x10         TB horizontal abd  NV RTB 2x10 R Tb 2x10          ankita single arm retraction holds             scap depressions with half rolls     NV                                                Ther Activity             C/S towel Rotation  10x10"            C/s towel extensions  10x10"            Seated t/s extensions supine Over maroon roll NV 10x5"  5x5" 10x5"                                                                          Ther Ex             UBE  2'/2' 2'/2' 2'/2'          Db shoulder flexion             DB shoulder abd              UT stretch NV 10" x5 bl 5x10" ea dc         Levator stretch  NV 10" x5 5x10" ea dc         Door pec stretch  NV 20" x3  20"x3 dc                                                                          Modalities

## 2022-03-24 ENCOUNTER — OFFICE VISIT (OUTPATIENT)
Dept: PHYSICAL THERAPY | Facility: CLINIC | Age: 27
End: 2022-03-24
Payer: COMMERCIAL

## 2022-03-24 DIAGNOSIS — G43.109 MIGRAINE WITH AURA AND WITHOUT STATUS MIGRAINOSUS, NOT INTRACTABLE: Primary | ICD-10-CM

## 2022-03-24 DIAGNOSIS — G89.29 CHRONIC INTRACTABLE HEADACHE, UNSPECIFIED HEADACHE TYPE: ICD-10-CM

## 2022-03-24 DIAGNOSIS — R51.9 CHRONIC INTRACTABLE HEADACHE, UNSPECIFIED HEADACHE TYPE: ICD-10-CM

## 2022-03-24 DIAGNOSIS — M54.2 NECK DISCOMFORT: ICD-10-CM

## 2022-03-24 PROCEDURE — 97110 THERAPEUTIC EXERCISES: CPT

## 2022-03-24 PROCEDURE — 97140 MANUAL THERAPY 1/> REGIONS: CPT

## 2022-03-24 PROCEDURE — 97112 NEUROMUSCULAR REEDUCATION: CPT

## 2022-03-24 NOTE — PROGRESS NOTES
Daily Note     Today's date: 3/24/2022  Patient name: Madeline Hassan  : 1995  MRN: [id number]  Referring provider: Sandie Owen MD  Dx:   Encounter Diagnosis     ICD-10-CM    1  Migraine with aura and without status migrainosus, not intractable  G43 109    2  Neck discomfort  M54 2    3  Chronic intractable headache, unspecified headache type  R51 9     G89 29                   Subjective: Pt reports no increased soreness following last visit  Objective: See treatment diary below      Assessment: Tolerated treatment well  Patient would benefit from continued PT  Decreased TTP of BL UT, levators noted  No increase sx noted w/ manual cervical stretching  Few cues needed for proper technique w/ TE  Plan: Progress treatment as tolerated  Precautions: None       Discussed use of hot pack for improving muscle tension-review NV?   Manuals 3/9 3/15 3/17 3/22 3/24        C/s stretching NV 5' 5' 5' 5'        UT/levator STM 4' 5' 5' 5' 5'        Sub occipital release 3' 5' 5' 5' 5'        Right side c/s down glides, MWM rotation              Prone t/s PA mobes   NV NV 5' NV                                  Neuro Re-Ed             Chin tucks  NV with stabilizor NV 10x5"  10x5" With stabilizor       Sustained c/s head lifts  NV NV 10x5"  10x5"        TB rows NV G Tb 2x10 GTB 2x10 G TB 2x10 GTb 2x10        TB extensions  NV G Tb 2x10 GTB 2x10 G TB 2x10 GTB 2x10        TB horizontal abd  NV RTB 2x10 R Tb 2x10  RTB 2x10        ankita single arm retraction holds             scap depressions with half rolls     NV                                                Ther Activity             C/S towel Rotation  10x10"            C/s towel extensions  10x10"            Seated t/s extensions supine  Over maroon roll NV 10x5"  5x5" 10x5" 10x5" supine                                                                         Ther Ex             UBE  2'/2' 2'/2' 2'/2'  2'/2'        Db shoulder flexion             DB shoulder abd              UT stretch NV 10" x5 bl 5x10" ea dc         Levator stretch  NV 10" x5 5x10" ea dc         Door pec stretch  NV 20" x3  20"x3 dc                                                                          Modalities

## 2022-03-29 ENCOUNTER — OFFICE VISIT (OUTPATIENT)
Dept: PHYSICAL THERAPY | Facility: CLINIC | Age: 27
End: 2022-03-29
Payer: COMMERCIAL

## 2022-03-29 DIAGNOSIS — G89.29 CHRONIC INTRACTABLE HEADACHE, UNSPECIFIED HEADACHE TYPE: ICD-10-CM

## 2022-03-29 DIAGNOSIS — M54.2 NECK DISCOMFORT: Primary | ICD-10-CM

## 2022-03-29 DIAGNOSIS — R51.9 CHRONIC INTRACTABLE HEADACHE, UNSPECIFIED HEADACHE TYPE: ICD-10-CM

## 2022-03-29 DIAGNOSIS — G43.109 MIGRAINE WITH AURA AND WITHOUT STATUS MIGRAINOSUS, NOT INTRACTABLE: ICD-10-CM

## 2022-03-29 PROCEDURE — 97112 NEUROMUSCULAR REEDUCATION: CPT | Performed by: PHYSICAL THERAPIST

## 2022-03-29 PROCEDURE — 97110 THERAPEUTIC EXERCISES: CPT | Performed by: PHYSICAL THERAPIST

## 2022-03-29 PROCEDURE — 97140 MANUAL THERAPY 1/> REGIONS: CPT | Performed by: PHYSICAL THERAPIST

## 2022-03-29 NOTE — PROGRESS NOTES
Daily Note     Today's date: 3/29/2022  Patient name: Betsy Osler  : 1995  MRN: [id number]  Referring provider: Yuri Fang MD  Dx:   Encounter Diagnosis     ICD-10-CM    1  Neck discomfort  M54 2    2  Chronic intractable headache, unspecified headache type  R51 9     G89 29    3  Migraine with aura and without status migrainosus, not intractable  G43  109                   Subjective: Patient states she has only had 1-2 headaches since beginning PT which is the longest stretch she has had without having headaches and she is very happy  Objective: See treatment diary below      Assessment: Tolerated treatment well  Patient would benefit from continued PT  Patient with no complaints with cervical mobes  Small amount of tenderness with t/s mobes at upper t/s today  She tolerates all exercises with increased reps/sets well without complaints  Patient continues to improve  Plan: Progress treatment as tolerated  Precautions: None       Discussed use of hot pack for improving muscle tension-review NV?   Manuals 3/9 3/15 3/17 3/22 3/24 3/29       C/s stretching NV 5' 5' 5' 5'        UT/levator STM 4' 5' 5' 5' 5' 5'       Sub occipital release 3' 5' 5' 5' 5' 5'       Right side c/s down glides, MWM rotation              Prone t/s PA mobes   NV NV 5' NV 5'                                 Neuro Re-Ed             Chin tucks  NV with stabilizor NV 10x5"  10x5" With stabilizor       Sustained c/s head lifts  NV NV 10x5"  10x5" 15x5"       TB rows NV G Tb 2x10 GTB 2x10 G TB 2x10 GTb 2x10 G TB 2x12       TB extensions  NV G Tb 2x10 GTB 2x10 G TB 2x10 GTB 2x10 G TB 2x12       TB horizontal abd  NV RTB 2x10 R Tb 2x10  RTB 2x10 R TB 2x12       ankita single arm retraction holds             scap depressions with half rolls     NV                                                Ther Activity             C/S towel Rotation  10x10"            C/s towel extensions  10x10"            Seated t/s extensions supine  Over maroon roll NV 10x5"  5x5" 10x5" 10x5" supine 10x5"                                                                        Ther Ex             UBE  2'/2' 2'/2' 2'/2'  2'/2' 2'/2'       Db shoulder flexion             DB shoulder abd              UT stretch NV 10" x5 bl 5x10" ea dc         Levator stretch  NV 10" x5 5x10" ea dc         Door pec stretch  NV 20" x3  20"x3 dc                                                                          Modalities

## 2022-03-30 ENCOUNTER — HOSPITAL ENCOUNTER (EMERGENCY)
Facility: HOSPITAL | Age: 27
Discharge: HOME | End: 2022-03-30
Attending: EMERGENCY MEDICINE | Admitting: EMERGENCY MEDICINE
Payer: COMMERCIAL

## 2022-03-30 ENCOUNTER — APPOINTMENT (EMERGENCY)
Dept: CT IMAGING | Facility: HOSPITAL | Age: 27
End: 2022-03-30
Payer: COMMERCIAL

## 2022-03-30 VITALS
SYSTOLIC BLOOD PRESSURE: 105 MMHG | OXYGEN SATURATION: 99 % | HEIGHT: 65 IN | TEMPERATURE: 97.9 F | RESPIRATION RATE: 18 BRPM | BODY MASS INDEX: 26.79 KG/M2 | HEART RATE: 88 BPM | DIASTOLIC BLOOD PRESSURE: 58 MMHG

## 2022-03-30 DIAGNOSIS — A08.4 VIRAL GASTROENTERITIS: ICD-10-CM

## 2022-03-30 DIAGNOSIS — R11.2 NAUSEA AND VOMITING: Primary | ICD-10-CM

## 2022-03-30 LAB
ALBUMIN SERPL BCP-MCNC: 3.9 G/DL (ref 3.5–5)
ALP SERPL-CCNC: 71 U/L (ref 46–116)
ALT SERPL W P-5'-P-CCNC: 23 U/L (ref 12–78)
ANION GAP SERPL CALCULATED.3IONS-SCNC: 8 MMOL/L (ref 4–13)
AST SERPL W P-5'-P-CCNC: 17 U/L (ref 5–45)
BACTERIA UR QL AUTO: NORMAL /HPF
BASOPHILS # BLD AUTO: 0.01 THOUSANDS/ΜL (ref 0–0.1)
BASOPHILS NFR BLD AUTO: 0 % (ref 0–1)
BILIRUB SERPL-MCNC: 0.47 MG/DL (ref 0.2–1)
BILIRUB UR QL STRIP: NEGATIVE
BUN SERPL-MCNC: 12 MG/DL (ref 5–25)
CALCIUM SERPL-MCNC: 8.6 MG/DL (ref 8.3–10.1)
CHLORIDE SERPL-SCNC: 101 MMOL/L (ref 100–108)
CLARITY UR: CLEAR
CO2 SERPL-SCNC: 27 MMOL/L (ref 21–32)
COLOR UR: YELLOW
CREAT SERPL-MCNC: 0.72 MG/DL (ref 0.6–1.3)
EOSINOPHIL # BLD AUTO: 0.02 THOUSAND/ΜL (ref 0–0.61)
EOSINOPHIL NFR BLD AUTO: 0 % (ref 0–6)
ERYTHROCYTE [DISTWIDTH] IN BLOOD BY AUTOMATED COUNT: 12.7 % (ref 11.6–15.1)
EXT PREG TEST URINE: NEGATIVE
EXT. CONTROL ED NAV: NORMAL
GFR SERPL CREATININE-BSD FRML MDRD: 116 ML/MIN/1.73SQ M
GLUCOSE SERPL-MCNC: 108 MG/DL (ref 65–140)
GLUCOSE UR STRIP-MCNC: NEGATIVE MG/DL
HCT VFR BLD AUTO: 38.4 % (ref 34.8–46.1)
HGB BLD-MCNC: 12.6 G/DL (ref 11.5–15.4)
HGB UR QL STRIP.AUTO: ABNORMAL
IMM GRANULOCYTES # BLD AUTO: 0.04 THOUSAND/UL (ref 0–0.2)
IMM GRANULOCYTES NFR BLD AUTO: 0 % (ref 0–2)
KETONES UR STRIP-MCNC: NEGATIVE MG/DL
LEUKOCYTE ESTERASE UR QL STRIP: NEGATIVE
LIPASE SERPL-CCNC: 92 U/L (ref 73–393)
LYMPHOCYTES # BLD AUTO: 0.36 THOUSANDS/ΜL (ref 0.6–4.47)
LYMPHOCYTES NFR BLD AUTO: 4 % (ref 14–44)
MCH RBC QN AUTO: 30.5 PG (ref 26.8–34.3)
MCHC RBC AUTO-ENTMCNC: 32.8 G/DL (ref 31.4–37.4)
MCV RBC AUTO: 93 FL (ref 82–98)
MONOCYTES # BLD AUTO: 0.43 THOUSAND/ΜL (ref 0.17–1.22)
MONOCYTES NFR BLD AUTO: 4 % (ref 4–12)
NEUTROPHILS # BLD AUTO: 9.37 THOUSANDS/ΜL (ref 1.85–7.62)
NEUTS SEG NFR BLD AUTO: 92 % (ref 43–75)
NITRITE UR QL STRIP: NEGATIVE
NON-SQ EPI CELLS URNS QL MICRO: NORMAL /HPF
NRBC BLD AUTO-RTO: 0 /100 WBCS
PH UR STRIP.AUTO: 7 [PH]
PLATELET # BLD AUTO: 253 THOUSANDS/UL (ref 149–390)
PMV BLD AUTO: 10.7 FL (ref 8.9–12.7)
POTASSIUM SERPL-SCNC: 3.5 MMOL/L (ref 3.5–5.3)
PROT SERPL-MCNC: 8.1 G/DL (ref 6.4–8.2)
PROT UR STRIP-MCNC: ABNORMAL MG/DL
RBC # BLD AUTO: 4.13 MILLION/UL (ref 3.81–5.12)
RBC #/AREA URNS AUTO: NORMAL /HPF
SODIUM SERPL-SCNC: 136 MMOL/L (ref 136–145)
SP GR UR STRIP.AUTO: 1.02 (ref 1–1.03)
UROBILINOGEN UR QL STRIP.AUTO: 0.2 E.U./DL
WBC # BLD AUTO: 10.23 THOUSAND/UL (ref 4.31–10.16)
WBC #/AREA URNS AUTO: NORMAL /HPF

## 2022-03-30 PROCEDURE — 36415 COLL VENOUS BLD VENIPUNCTURE: CPT

## 2022-03-30 PROCEDURE — 99284 EMERGENCY DEPT VISIT MOD MDM: CPT

## 2022-03-30 PROCEDURE — 83690 ASSAY OF LIPASE: CPT | Performed by: EMERGENCY MEDICINE

## 2022-03-30 PROCEDURE — 96361 HYDRATE IV INFUSION ADD-ON: CPT

## 2022-03-30 PROCEDURE — 81025 URINE PREGNANCY TEST: CPT | Performed by: EMERGENCY MEDICINE

## 2022-03-30 PROCEDURE — 74176 CT ABD & PELVIS W/O CONTRAST: CPT

## 2022-03-30 PROCEDURE — 81001 URINALYSIS AUTO W/SCOPE: CPT | Performed by: EMERGENCY MEDICINE

## 2022-03-30 PROCEDURE — 80053 COMPREHEN METABOLIC PANEL: CPT | Performed by: EMERGENCY MEDICINE

## 2022-03-30 PROCEDURE — 85025 COMPLETE CBC W/AUTO DIFF WBC: CPT | Performed by: EMERGENCY MEDICINE

## 2022-03-30 PROCEDURE — 99284 EMERGENCY DEPT VISIT MOD MDM: CPT | Performed by: EMERGENCY MEDICINE

## 2022-03-30 PROCEDURE — 96374 THER/PROPH/DIAG INJ IV PUSH: CPT

## 2022-03-30 RX ORDER — ONDANSETRON 4 MG/1
4 TABLET, ORALLY DISINTEGRATING ORAL EVERY 6 HOURS PRN
Qty: 10 TABLET | Refills: 0 | Status: SHIPPED | OUTPATIENT
Start: 2022-03-30

## 2022-03-30 RX ORDER — KETOROLAC TROMETHAMINE 30 MG/ML
15 INJECTION, SOLUTION INTRAMUSCULAR; INTRAVENOUS ONCE
Status: COMPLETED | OUTPATIENT
Start: 2022-03-30 | End: 2022-03-30

## 2022-03-30 RX ORDER — ONDANSETRON 4 MG/1
4 TABLET, ORALLY DISINTEGRATING ORAL ONCE
Status: COMPLETED | OUTPATIENT
Start: 2022-03-30 | End: 2022-03-30

## 2022-03-30 RX ORDER — ACETAMINOPHEN 325 MG/1
975 TABLET ORAL ONCE
Status: COMPLETED | OUTPATIENT
Start: 2022-03-30 | End: 2022-03-30

## 2022-03-30 RX ORDER — NAPROXEN 500 MG/1
500 TABLET ORAL 2 TIMES DAILY WITH MEALS
Qty: 10 TABLET | Refills: 0 | Status: SHIPPED | OUTPATIENT
Start: 2022-03-30 | End: 2022-04-04

## 2022-03-30 RX ADMIN — ONDANSETRON 4 MG: 4 TABLET, ORALLY DISINTEGRATING ORAL at 13:46

## 2022-03-30 RX ADMIN — ACETAMINOPHEN 975 MG: 325 TABLET ORAL at 15:34

## 2022-03-30 RX ADMIN — SODIUM CHLORIDE 1000 ML: 0.9 INJECTION, SOLUTION INTRAVENOUS at 15:34

## 2022-03-30 RX ADMIN — KETOROLAC TROMETHAMINE 15 MG: 30 INJECTION, SOLUTION INTRAMUSCULAR at 15:34

## 2022-03-30 NOTE — Clinical Note
Annamarie Nicolas was seen and treated in our emergency department on 3/30/2022  No restrictions            Diagnosis: vomiting    Vesta Orellana  may return to work on return date  She may return on this date: 04/01/2022         If you have any questions or concerns, please don't hesitate to call        Cass Cerna DO    ______________________________           _______________          _______________  Hospital Representative                              Date                                Time

## 2022-04-02 NOTE — ED PROVIDER NOTES
History  Chief Complaint   Patient presents with    Vomiting     Pt woke up around 2am with nausea/vomiting/diarrhea  States she cannot keep fluids/food down, also c/o worsening low back pain and headache  Denies  sx  Denies CP/SOB  24 yo F coming into the ED for eval of vomiting, diarrhea, fever, lower abdominal and back pain that started around 2am  Persistent throughout the day, no urinary symptoms, not eating or drinking  Pain comes and goes  History provided by:  Patient   used: No    Vomiting  Associated symptoms: abdominal pain, diarrhea and fever        Prior to Admission Medications   Prescriptions Last Dose Informant Patient Reported? Taking? pantoprazole (PROTONIX) 40 mg tablet Past Week at Unknown time  No Yes   Sig: Take 1 tablet (40 mg total) by mouth daily      Facility-Administered Medications: None       Past Medical History:   Diagnosis Date    Anxiety     no meds during pregnancy    Depression     no meds during pregnancy    Migraines     Miscarriage     Varicella     IMMUNE       History reviewed  No pertinent surgical history  Family History   Problem Relation Age of Onset   Rakan Bones Breast cancer Mother 36    Ovarian cancer Mother     Depression Mother     Diabetes Mother     Kidney cancer Mother     Anxiety disorder Mother     Hypertension Father     Other Father         glycogen storage disease due to acid maltase deficiency -infusions weekly    Diabetes Maternal Aunt     Hypertension Maternal Grandmother     Kidney cancer Maternal Grandfather     Kidney cancer Other     Brain cancer Other     Anxiety disorder Brother     No Known Problems Son      I have reviewed and agree with the history as documented      E-Cigarette/Vaping    E-Cigarette Use Former User      E-Cigarette/Vaping Substances    THC Yes     CBD Yes      Social History     Tobacco Use    Smoking status: Former Smoker     Types: Cigarettes     Quit date: 2016     Years since quittin 2    Smokeless tobacco: Never Used   Vaping Use    Vaping Use: Former    Substances: THC, CBD   Substance Use Topics    Alcohol use: Not Currently     Comment: social- Rare    Drug use: Yes     Types: Marijuana     Comment: Vape, daily        Review of Systems   Constitutional: Positive for fever  Gastrointestinal: Positive for abdominal pain, diarrhea, nausea and vomiting  All other systems reviewed and are negative  Physical Exam  Physical Exam  Vitals and nursing note reviewed  Constitutional:       General: She is not in acute distress  Appearance: Normal appearance  She is well-developed and normal weight  She is not ill-appearing, toxic-appearing or diaphoretic  HENT:      Head: Normocephalic and atraumatic  Right Ear: External ear normal       Left Ear: External ear normal       Nose: Nose normal       Mouth/Throat:      Mouth: Mucous membranes are moist       Pharynx: Oropharynx is clear  Eyes:      Extraocular Movements: Extraocular movements intact  Conjunctiva/sclera: Conjunctivae normal    Cardiovascular:      Rate and Rhythm: Normal rate and regular rhythm  Pulses: Normal pulses  Heart sounds: Normal heart sounds  Pulmonary:      Effort: Pulmonary effort is normal       Breath sounds: Normal breath sounds  Abdominal:      General: Abdomen is flat  Bowel sounds are normal  There is no distension or abdominal bruit  There are no signs of injury  Palpations: Abdomen is soft  There is no shifting dullness  Tenderness: There is abdominal tenderness in the left lower quadrant  There is no right CVA tenderness, left CVA tenderness, guarding or rebound  Negative signs include Mai's sign, Rovsing's sign, McBurney's sign, psoas sign and obturator sign  Hernia: No hernia is present  Genitourinary:     Adnexa: Right adnexa normal and left adnexa normal    Musculoskeletal:         General: Normal range of motion        Cervical back: Normal range of motion and neck supple  Skin:     General: Skin is warm and dry  Capillary Refill: Capillary refill takes less than 2 seconds  Neurological:      General: No focal deficit present  Mental Status: She is alert     Psychiatric:         Mood and Affect: Mood normal          Behavior: Behavior normal          Vital Signs  ED Triage Vitals [03/30/22 1336]   Temperature Pulse Respirations Blood Pressure SpO2   97 9 °F (36 6 °C) 105 20 128/59 98 %      Temp Source Heart Rate Source Patient Position - Orthostatic VS BP Location FiO2 (%)   Oral Monitor Sitting Left arm --      Pain Score       8           Vitals:    03/30/22 1336 03/30/22 1653   BP: 128/59 105/58   Pulse: 105 88   Patient Position - Orthostatic VS: Sitting Lying         Visual Acuity      ED Medications  Medications   ondansetron (ZOFRAN-ODT) dispersible tablet 4 mg (4 mg Oral Given 3/30/22 1346)   sodium chloride 0 9 % bolus 1,000 mL (0 mL Intravenous Stopped 3/30/22 1634)   ketorolac (TORADOL) injection 15 mg (15 mg Intravenous Given 3/30/22 1534)   acetaminophen (TYLENOL) tablet 975 mg (975 mg Oral Given 3/30/22 1534)       Diagnostic Studies  Results Reviewed     Procedure Component Value Units Date/Time    Urine Microscopic [[id number]]  (Normal) Collected: 03/30/22 1525    Lab Status: Final result Specimen: Urine, Clean Catch Updated: 03/30/22 1558     RBC, UA 2-4 /hpf      WBC, UA 0-1 /hpf      Epithelial Cells Occasional /hpf      Bacteria, UA Occasional /hpf     UA w Reflex to Microscopic w Reflex to Culture [[id number]]  (Abnormal) Collected: 03/30/22 1525    Lab Status: Final result Specimen: Urine, Clean Catch Updated: 03/30/22 1539     Color, UA Yellow     Clarity, UA Clear     Specific [place], UA 1 020     pH, UA 7 0     Leukocytes, UA Negative     Nitrite, UA Negative     Protein, UA 30 (1+) mg/dl      Glucose, UA Negative mg/dl      Ketones, UA Negative mg/dl      Urobilinogen, UA 0 2 E U /dl      Bilirubin, UA Negative     Blood, UA Small    POCT pregnancy, urine [[id number]]  (Normal) Resulted: 03/30/22 1528    Lab Status: Final result Updated: 03/30/22 1528     EXT PREG TEST UR (Ref: Negative) Negative     Control Valid    CBC and differential [[id number]]  (Abnormal) Collected: 03/30/22 1345    Lab Status: Final result Specimen: Blood from Arm, Right Updated: 03/30/22 1421     WBC 10 23 Thousand/uL      RBC 4 13 Million/uL      Hemoglobin 12 6 g/dL      Hematocrit 38 4 %      MCV 93 fL      MCH 30 5 pg      MCHC 32 8 g/dL      RDW 12 7 %      MPV 10 7 fL      Platelets 819 Thousands/uL      nRBC 0 /100 WBCs      Neutrophils Relative 92 %      Immat GRANS % 0 %      Lymphocytes Relative 4 %      Monocytes Relative 4 %      Eosinophils Relative 0 %      Basophils Relative 0 %      Neutrophils Absolute 9 37 Thousands/µL      Immature Grans Absolute 0 04 Thousand/uL      Lymphocytes Absolute 0 36 Thousands/µL      Monocytes Absolute 0 43 Thousand/µL      Eosinophils Absolute 0 02 Thousand/µL      Basophils Absolute 0 01 Thousands/µL     Narrative: This is an appended report  These results have been appended to a previously verified report      Comprehensive metabolic panel [[id number]] Collected: 03/30/22 1345    Lab Status: Final result Specimen: Blood from Arm, Right Updated: 03/30/22 1412     Sodium 136 mmol/L      Potassium 3 5 mmol/L      Chloride 101 mmol/L      CO2 27 mmol/L      ANION GAP 8 mmol/L      BUN 12 mg/dL      Creatinine 0 72 mg/dL      Glucose 108 mg/dL      Calcium 8 6 mg/dL      AST 17 U/L      ALT 23 U/L      Alkaline Phosphatase 71 U/L      Total Protein 8 1 g/dL      Albumin 3 9 g/dL      Total Bilirubin 0 47 mg/dL      eGFR 116 ml/min/1 73sq m     Narrative:      [place] guidelines for Chronic Kidney Disease (CKD):     Stage 1 with normal or high GFR (GFR > 90 mL/min/1 73 square meters)    Stage 2 Mild CKD (GFR = 60-89 mL/min/1 73 square meters)    Stage 3A Moderate CKD (GFR = 45-59 mL/min/1 73 square meters)    Stage 3B Moderate CKD (GFR = 30-44 mL/min/1 73 square meters)    Stage 4 Severe CKD (GFR = 15-29 mL/min/1 73 square meters)    Stage 5 End Stage CKD (GFR <15 mL/min/1 73 square meters)  Note: GFR calculation is accurate only with a steady state creatinine    Lipase [[id number]]  (Normal) Collected: 03/30/22 1345    Lab Status: Final result Specimen: Blood from Arm, Right Updated: 03/30/22 1406     Lipase 92 u/L                  CT renal stone study abdomen pelvis without contrast   Final Result by Yaneth Crowe MD (03/30 1631)      No acute CT findings  Workstation performed: [id number]                    Procedures  Procedures         ED Course  ED Course as of 04/02/22 0721   Wed Mar 30, 2022   1648 Pt states feeling much better  Labs, urine unremarkable  Negative pregnancy  CT negative for any acute findings  Will d/c home, likely viral syndrome, gastroenteritis  SBIRT 20yo+      Most Recent Value   SBIRT (22 yo +)    In order to provide better care to our patients, we are screening all of our patients for alcohol and drug use  Would it be okay to ask you these screening questions? Yes Filed at: 03/30/2022 1537   Initial Alcohol Screen: US AUDIT-C     1  How often do you have a drink containing alcohol? 3 Filed at: 03/30/2022 1537   2  How many drinks containing alcohol do you have on a typical day you are drinking? 1 Filed at: 03/30/2022 1537   3b  FEMALE Any Age, or MALE 65+: How often do you have 4 or more drinks on one occassion? 0 Filed at: 03/30/2022 1537   Audit-C Score 4 Filed at: 03/30/2022 1537   ROBE: How many times in the past year have you    Used an illegal drug or used a prescription medication for non-medical reasons?  Never Filed at: 03/30/2022 1537                    MDM  Number of Diagnoses or Management Options  Nausea and vomiting: new and requires workup  Viral gastroenteritis: new and requires workup Amount and/or Complexity of Data Reviewed  Clinical lab tests: ordered and reviewed  Tests in the radiology section of CPT®: ordered and reviewed    Risk of Complications, Morbidity, and/or Mortality  Presenting problems: moderate  Diagnostic procedures: moderate  Management options: moderate    Patient Progress  Patient progress: stable      Disposition  Final diagnoses:   Nausea and vomiting   Viral gastroenteritis     Time reflects when diagnosis was documented in both MDM as applicable and the Disposition within this note     Time User Action Codes Description Comment    3/30/2022  4:51 PM Igor Lackey Add [R11 2] Nausea and vomiting     3/30/2022  4:51 PM Igor Lackey Add [A08 4] Viral gastroenteritis       ED Disposition     ED Disposition Condition Date/Time Comment    Discharge Stable Wed Mar 30, 2022  4:51 PM Nayeli Jon discharge to home/self care              Follow-up Information     Follow up With Specialties Details Why Contact Info Additional Information    Emily Glez MD Family Medicine In 3 days As needed [address]  [address]  [id number] [place] [address] Emergency Department Emergency Medicine  As needed, If symptoms worsen [address] Emergency Department,  [address], [place], [address]          Discharge Medication List as of 3/30/2022  4:55 PM      START taking these medications    Details   naproxen (EC NAPROSYN) 500 MG EC tablet Take 1 tablet (500 mg total) by mouth 2 (two) times a day with meals for 5 days, Starting Wed 3/30/2022, Until Mon 4/4/2022, Normal      ondansetron (ZOFRAN-ODT) 4 mg disintegrating tablet Take 1 tablet (4 mg total) by mouth every 6 (six) hours as needed for nausea or vomiting for up to 10 doses, Starting Wed 3/30/2022, Normal         CONTINUE these medications which have NOT CHANGED    Details pantoprazole (PROTONIX) 40 mg tablet Take 1 tablet (40 mg total) by mouth daily, Starting Fri 3/18/2022, Normal             No discharge procedures on file      PDMP Review     None          ED Provider  Electronically Signed by           Grant Cardona DO  04/02/22 5089

## 2022-04-05 ENCOUNTER — OFFICE VISIT (OUTPATIENT)
Dept: PHYSICAL THERAPY | Facility: CLINIC | Age: 27
End: 2022-04-05
Payer: COMMERCIAL

## 2022-04-05 DIAGNOSIS — G43.109 MIGRAINE WITH AURA AND WITHOUT STATUS MIGRAINOSUS, NOT INTRACTABLE: ICD-10-CM

## 2022-04-05 DIAGNOSIS — M54.2 NECK DISCOMFORT: Primary | ICD-10-CM

## 2022-04-05 DIAGNOSIS — G89.29 CHRONIC INTRACTABLE HEADACHE, UNSPECIFIED HEADACHE TYPE: ICD-10-CM

## 2022-04-05 DIAGNOSIS — R51.9 CHRONIC INTRACTABLE HEADACHE, UNSPECIFIED HEADACHE TYPE: ICD-10-CM

## 2022-04-05 PROCEDURE — 97112 NEUROMUSCULAR REEDUCATION: CPT | Performed by: PHYSICAL THERAPIST

## 2022-04-05 PROCEDURE — 97140 MANUAL THERAPY 1/> REGIONS: CPT | Performed by: PHYSICAL THERAPIST

## 2022-04-05 NOTE — PROGRESS NOTES
Daily Note     Today's date: 2022  Patient name: Dana Gallegos  : 1995  MRN: [id number]  Referring provider: Lisa Wong MD  Dx:   Encounter Diagnosis     ICD-10-CM    1  Neck discomfort  M54 2    2  Chronic intractable headache, unspecified headache type  R51 9     G89 29    3  Migraine with aura and without status migrainosus, not intractable  G43  109                   Subjective: Patient states she has had a headache for the last 24 hours  She notes she was in the hospital last week due to 48 hours virus with N/V and likely increased tension in neck  Denies pain after last session  Objective: See treatment diary below      Assessment: Tolerated treatment well  Patient would benefit from continued PT  Held on c/s stability exercises today 2/2 increased occipital pain  She tolerates all TB exercises well  Tenderness with SOR though reports improved pain and headache post session  Progress NV based on symptoms  Plan: Progress treatment as tolerated  Precautions: None       Discussed use of hot pack for improving muscle tension-review NV?   Manuals 3/9 3/15 3/17 3/22 3/24 3/29 4/5      C/s stretching NV 5' 5' 5' 5'  3'      UT/levator STM 4' 5' 5' 5' 5' 5' 5'      Sub occipital release 3' 5' 5' 5' 5' 5' 8'      Right side c/s down glides, MWM rotation              Prone t/s PA mobes   NV NV 5' NV 5' 5'                                Neuro Re-Ed             Chin tucks  NV with stabilizor NV 10x5"  10x5" With stabilizor NV      Sustained c/s head lifts  NV NV 10x5"  10x5" 15x5" NV      TB rows NV G Tb 2x10 GTB 2x10 G TB 2x10 GTb 2x10 G TB 2x12 G TB 2x12      TB extensions  NV G Tb 2x10 GTB 2x10 G TB 2x10 GTB 2x10 G TB 2x12 G TB 2x12      TB horizontal abd  NV RTB 2x10 R Tb 2x10  RTB 2x10 R TB 2x12 G TB 2x12      ankita single arm retraction holds             scap depressions with half rolls     NV   NV                                             Ther Activity             C/S towel Rotation  10x10"            C/s towel extensions  10x10"            Seated t/s extensions supine  Over maroon roll NV 10x5"  5x5" 10x5" 10x5" supine 10x5" 10x5"                                                                        Ther Ex             UBE  2'/2' 2'/2' 2'/2'  2'/2' 2'/2' 2'/2' 3'/'3     Db shoulder flexion             DB shoulder abd              UT stretch NV 10" x5 bl 5x10" ea dc         Levator stretch  NV 10" x5 5x10" ea dc         Door pec stretch  NV 20" x3  20"x3 dc                                                                          Modalities

## 2022-04-07 ENCOUNTER — OFFICE VISIT (OUTPATIENT)
Dept: PHYSICAL THERAPY | Facility: CLINIC | Age: 27
End: 2022-04-07
Payer: COMMERCIAL

## 2022-04-07 DIAGNOSIS — G89.29 CHRONIC INTRACTABLE HEADACHE, UNSPECIFIED HEADACHE TYPE: ICD-10-CM

## 2022-04-07 DIAGNOSIS — G43.109 MIGRAINE WITH AURA AND WITHOUT STATUS MIGRAINOSUS, NOT INTRACTABLE: ICD-10-CM

## 2022-04-07 DIAGNOSIS — R51.9 CHRONIC INTRACTABLE HEADACHE, UNSPECIFIED HEADACHE TYPE: ICD-10-CM

## 2022-04-07 DIAGNOSIS — M54.2 NECK DISCOMFORT: Primary | ICD-10-CM

## 2022-04-07 PROCEDURE — 97112 NEUROMUSCULAR REEDUCATION: CPT

## 2022-04-07 PROCEDURE — 97530 THERAPEUTIC ACTIVITIES: CPT

## 2022-04-07 PROCEDURE — 97140 MANUAL THERAPY 1/> REGIONS: CPT

## 2022-04-07 NOTE — PROGRESS NOTES
Daily Note     Today's date: 2022  Patient name: Dana Gallegos  : 1995  MRN: [id number]  Referring provider: Lisa Wong MD  Dx:   Encounter Diagnosis     ICD-10-CM    1  Neck discomfort  M54 2    2  Chronic intractable headache, unspecified headache type  R51 9     G89 29    3  Migraine with aura and without status migrainosus, not intractable  G43  109                   Subjective: Pt states her headache persists, but is less intense than Monday  Objective: See treatment diary below      Assessment: Tolerated treatment fair  Patient would benefit from continued PT   Mild discomfort noted w/ cervical rotation to the R; moderate tenderness of suboccipitals persist   BL UT/levator tightness noted; consider [place] nv? Plan: Progress treatment as tolerated  Precautions: None       Discussed use of hot pack for improving muscle tension-review NV?   Manuals 3/9 3/15 3/17 3/22 3/24 3/29 4/5 4/7     C/s stretching NV 5' 5' 5' 5'  3' 5'     UT/levator STM 4' 5' 5' 5' 5' 5' 5' 5'     Sub occipital release 3' 5' 5' 5' 5' 5' 8' 7'     Right side c/s down glides, MWM rotation              Prone t/s PA mobes   NV NV 5' NV 5' 5' NV                               Neuro Re-Ed             Chin tucks  NV with stabilizor NV 10x5"  10x5" With stabilizor NV NV     Sustained c/s head lifts  NV NV 10x5"  10x5" 15x5" NV NV     TB rows NV G Tb 2x10 GTB 2x10 G TB 2x10 GTb 2x10 G TB 2x12 G TB 2x12 GTB 2x10     TB extensions  NV G Tb 2x10 GTB 2x10 G TB 2x10 GTB 2x10 G TB 2x12 G TB 2x12 GTB 2x10     TB horizontal abd  NV RTB 2x10 R Tb 2x10  RTB 2x10 R TB 2x12 G TB 2x12 GTB 2x10     ankita single arm retraction holds             scap depressions with half rolls     NV   NV NV                                            Ther Activity             C/S towel Rotation  10x10"            C/s towel extensions  10x10"            Seated t/s extensions supine  Over maroon roll NV 10x5"  5x5" 10x5" 10x5" supine 10x5" 10x5" 10x5"                                                                      Ther Ex             UBE  2'/2' 2'/2' 2'/2'  2'/2' 2'/2' 2'/2' 2'/2'     Db shoulder flexion             DB shoulder abd              UT stretch NV 10" x5 bl 5x10" ea dc         Levator stretch  NV 10" x5 5x10" ea dc         Door pec stretch  NV 20" x3  20"x3 dc                                                                          Modalities

## 2022-04-12 ENCOUNTER — OFFICE VISIT (OUTPATIENT)
Dept: PHYSICAL THERAPY | Facility: CLINIC | Age: 27
End: 2022-04-12
Payer: COMMERCIAL

## 2022-04-12 DIAGNOSIS — R51.9 CHRONIC INTRACTABLE HEADACHE, UNSPECIFIED HEADACHE TYPE: ICD-10-CM

## 2022-04-12 DIAGNOSIS — G43.109 MIGRAINE WITH AURA AND WITHOUT STATUS MIGRAINOSUS, NOT INTRACTABLE: ICD-10-CM

## 2022-04-12 DIAGNOSIS — G89.29 CHRONIC INTRACTABLE HEADACHE, UNSPECIFIED HEADACHE TYPE: ICD-10-CM

## 2022-04-12 DIAGNOSIS — M54.2 NECK DISCOMFORT: Primary | ICD-10-CM

## 2022-04-12 PROCEDURE — 97110 THERAPEUTIC EXERCISES: CPT | Performed by: PHYSICAL THERAPIST

## 2022-04-12 PROCEDURE — 97140 MANUAL THERAPY 1/> REGIONS: CPT | Performed by: PHYSICAL THERAPIST

## 2022-04-12 PROCEDURE — 97112 NEUROMUSCULAR REEDUCATION: CPT | Performed by: PHYSICAL THERAPIST

## 2022-04-12 NOTE — PROGRESS NOTES
Daily Note     Today's date: 2022  Patient name: Clare Damon  : 1995  MRN: [id number]  Referring provider: Jaquan Caraballo MD  Dx:   Encounter Diagnosis     ICD-10-CM    1  Neck discomfort  M54 2    2  Chronic intractable headache, unspecified headache type  R51 9     G89 29    3  Migraine with aura and without status migrainosus, not intractable  G43  109                   Subjective: Patient reports she has been feeling much better with her headaches since last week  She has not had any migraines and head and neck pain has decreased substantially  Objective: See treatment diary below      Assessment: Tolerated treatment well  Patient would benefit from continued PT   Patient tolerates session well  She has no difficulty with progressions to blue TB and was updated for HEP  She continues to have increased tightness into bilateral UT and levator  She has good tolerance to stretching, and increased fatigue noted with prone YTWs  Plan: Progress treatment as tolerated  Precautions: None       Discussed use of hot pack for improving muscle tension-review NV?   Manuals 3/9 3/15 3/17 3/22 3/24 3/29 4/5 4/7 4/12    C/s stretching NV 5' 5' 5' 5'  3' 5' 5'    UT/levator STM 4' 5' 5' 5' 5' 5' 5' 5' 5'    Sub occipital release 3' 5' 5' 5' 5' 5' 8' 7' 7'                 Prone t/s PA mobes   NV NV 5' NV 5' 5' NV 5'                              Neuro Re-Ed             Chin tucks  NV with stabilizor NV 10x5"  10x5" With stabilizor NV NV     Sustained c/s head lifts  NV NV 10x5"  10x5" 15x5" NV NV     TB rows NV G Tb 2x10 GTB 2x10 G TB 2x10 GTb 2x10 G TB 2x12 G TB 2x12 GTB 2x10 B TB 2x10 HEP   TB extensions  NV G Tb 2x10 GTB 2x10 G TB 2x10 GTB 2x10 G TB 2x12 G TB 2x12 GTB 2x10 B TB 2x10  HEP   TB horizontal abd  NV RTB 2x10 R Tb 2x10  RTB 2x10 R TB 2x12 G TB 2x12 GTB 2x10     ankita single arm retraction holds             scap depressions with half rolls     NV     NV    Prone YTWs at bench 10x ea seated                               Ther Activity             C/S towel Rotation  10x10"            C/s towel extensions  10x10"            Seated t/s extensions supine  Over maroon roll NV 10x5"  5x5" 10x5" 10x5" supine 10x5" 10x5"  10x5" 10x5"                                                                      Ther Ex             UBE  2'/2' 2'/2' 2'/2'  2'/2' 2'/2' 2'/2' 2'/2' 2'/2'    Db shoulder flexion             DB shoulder abd              UT stretch NV 10" x5 bl 5x10" ea dc         Levator stretch  NV 10" x5 5x10" ea dc         Door pec stretch  NV 20" x3  20"x3 dc                                                                          Modalities

## 2022-04-14 ENCOUNTER — OFFICE VISIT (OUTPATIENT)
Dept: PHYSICAL THERAPY | Facility: CLINIC | Age: 27
End: 2022-04-14
Payer: COMMERCIAL

## 2022-04-14 DIAGNOSIS — G89.29 CHRONIC INTRACTABLE HEADACHE, UNSPECIFIED HEADACHE TYPE: ICD-10-CM

## 2022-04-14 DIAGNOSIS — R51.9 CHRONIC INTRACTABLE HEADACHE, UNSPECIFIED HEADACHE TYPE: ICD-10-CM

## 2022-04-14 DIAGNOSIS — G43.109 MIGRAINE WITH AURA AND WITHOUT STATUS MIGRAINOSUS, NOT INTRACTABLE: ICD-10-CM

## 2022-04-14 DIAGNOSIS — M54.2 NECK DISCOMFORT: Primary | ICD-10-CM

## 2022-04-14 PROCEDURE — 97140 MANUAL THERAPY 1/> REGIONS: CPT

## 2022-04-14 PROCEDURE — 97112 NEUROMUSCULAR REEDUCATION: CPT

## 2022-04-14 NOTE — PROGRESS NOTES
Daily Note     Today's date: 2022  Patient name: Aparna Cabrera  : 1995  MRN: [id number]  Referring provider: Danisha Choudhury MD  Dx:   Encounter Diagnosis     ICD-10-CM    1  Neck discomfort  M54 2    2  Chronic intractable headache, unspecified headache type  R51 9     G89 29    3  Migraine with aura and without status migrainosus, not intractable  G43  109                   Subjective: Pt c/o mild (posterior) HA this morning  Objective: See treatment diary below      Assessment: Tolerated treatment fair  Patient would benefit from continued PT   TTP of suboccipitals persists  Tolerates addition of seated scap depression fairly  Well; greater stretch felt on R side  Prone YTW fatiguing for pt  Decrease in sx noted post tx  Plan: Continue per plan of care  Precautions: None       Discussed use of hot pack for improving muscle tension-review NV?   Manuals 3/9 3/15 3/17 3/22 3/24 3/29 4   C/s stretching NV 5' 5' 5' 5'  3' 5' 5' 5'   UT/levator STM 4' 5' 5' 5' 5' 5' 5' 5' 5' 5'   Sub occipital release 3' 5' 5' 5' 5' 5' 8' 7' 7' 5'                Prone t/s PA mobes   NV NV 5' NV 5' 5' NV 5' NV                             Neuro Re-Ed             Chin tucks  NV with stabilizor NV 10x5"  10x5" With stabilizor NV NV     Sustained c/s head lifts  NV NV 10x5"  10x5" 15x5" NV NV     TB rows NV G Tb 2x10 GTB 2x10 G TB 2x10 GTb 2x10 G TB 2x12 G TB 2x12 GTB 2x10 B TB 2x10 HEP   TB extensions  NV G Tb 2x10 GTB 2x10 G TB 2x10 GTB 2x10 G TB 2x12 G TB 2x12 GTB 2x10 B TB 2x10  HEP   TB horizontal abd  NV RTB 2x10 R Tb 2x10  RTB 2x10 R TB 2x12 G TB 2x12 GTB 2x10     ankita single arm retraction holds             scap depressions with half rolls     NV     NV 10   Prone YTWs at bench          10x ea seated  10 ea                             Ther Activity             C/S towel Rotation  10x10"            C/s towel extensions  10x10"            Seated t/s extensions supine  Over maroon roll NV 10x5"  5x5" 10x5" 10x5" supine 10x5" 10x5"  10x5" 10x5"  10x5"                                                                    Ther Ex             UBE  2'/2' 2'/2' 2'/2'  2'/2' 2'/2' 2'/2' 2'/2' 2'/2' 2'/2'   Db shoulder flexion             DB shoulder abd              UT stretch NV 10" x5 bl 5x10" ea dc         Levator stretch  NV 10" x5 5x10" ea dc         Door pec stretch  NV 20" x3  20"x3 dc                                                                          Modalities                                          1:1 [id number]

## 2022-04-18 ENCOUNTER — OFFICE VISIT (OUTPATIENT)
Dept: PHYSICAL THERAPY | Facility: CLINIC | Age: 27
End: 2022-04-18
Payer: COMMERCIAL

## 2022-04-18 DIAGNOSIS — G89.29 CHRONIC INTRACTABLE HEADACHE, UNSPECIFIED HEADACHE TYPE: ICD-10-CM

## 2022-04-18 DIAGNOSIS — G43.109 MIGRAINE WITH AURA AND WITHOUT STATUS MIGRAINOSUS, NOT INTRACTABLE: ICD-10-CM

## 2022-04-18 DIAGNOSIS — M54.2 NECK DISCOMFORT: Primary | ICD-10-CM

## 2022-04-18 DIAGNOSIS — R51.9 CHRONIC INTRACTABLE HEADACHE, UNSPECIFIED HEADACHE TYPE: ICD-10-CM

## 2022-04-18 PROCEDURE — 97112 NEUROMUSCULAR REEDUCATION: CPT

## 2022-04-18 PROCEDURE — 97110 THERAPEUTIC EXERCISES: CPT

## 2022-04-18 PROCEDURE — 97140 MANUAL THERAPY 1/> REGIONS: CPT

## 2022-04-18 NOTE — PROGRESS NOTES
Daily Note     Today's date: 2022  Patient name: Oskar Street  : 1995  MRN: [id number]  Referring provider: Aaron Bailey MD  Dx:   Encounter Diagnosis     ICD-10-CM    1  Neck discomfort  M54 2    2  Chronic intractable headache, unspecified headache type  R51 9     G89 29    3  Migraine with aura and without status migrainosus, not intractable  G43  109                   Subjective: Pt states he felt a bit tired/sore between her shoulder blades after her last PT session  Pt state she feels pretty good over all today  Objective: See treatment diary below      Assessment: Tolerated treatment fair as she continues to fatigue with TE  Pt had decreased muscle restricion and increased ROM post manual PT  Pt reuqired VC with tband shoulder ext to decrease rounded shoulder compensation for posterior muscle weakness  Patient would benefit from continued PT   Plan: Continue per plan of care  Precautions: None       Discussed use of hot pack for improving muscle tension-review NV?     Manuals 3/24 3/29 4    C/s stretching 5'  3' 5' 5' 5' CF   UT/levator STM 5' 5' 5' 5' 5' 5' CF   Sub occipital release 5' 5' 8' 7' 7' 5' CF             Prone t/s PA mobes  NV 5' 5' NV 5' NV np                        Neuro Re-Ed          Chin tucks 10x5" With stabilizor NV NV      Sustained c/s head lifts 10x5" 15x5" NV NV      TB rows GTb 2x10 G TB 2x12 G TB 2x12 GTB 2x10 B TB 2x10 HEP BTB 2 x 10    TB extensions  GTB 2x10 G TB 2x12 G TB 2x12 GTB 2x10 B TB 2x10  HEP BTB 2 x 10    TB horizontal abd RTB 2x10 R TB 2x12 G TB 2x12 GTB 2x10      ankita single arm retraction holds          scap depressions with half rolls      NV 10 15x    Prone YTWs at bench      10x ea seated  10 ea 10x ea                        Ther Activity          C/S towel Rotation           C/s towel extensions           Seated t/s extensions supine  Over maroon roll 10x5" supine 10x5" 10x5"  10x5" 10x5"  10x5" 10 x 5" Ther Ex          UBE 2'/2' 2'/2' 2'/2' 2'/2' 2'/2' 2'/2' 2'/2'    Db shoulder flexion          DB shoulder abd           UT stretch          Levator stretch           Door pec stretch                                                             Modalities

## 2022-04-21 ENCOUNTER — APPOINTMENT (OUTPATIENT)
Dept: PHYSICAL THERAPY | Facility: CLINIC | Age: 27
End: 2022-04-21
Payer: COMMERCIAL

## 2022-04-23 DIAGNOSIS — K29.00 ACUTE SUPERFICIAL GASTRITIS WITHOUT HEMORRHAGE: ICD-10-CM

## 2022-04-25 RX ORDER — PANTOPRAZOLE SODIUM 40 MG/1
TABLET, DELAYED RELEASE ORAL
Qty: 30 TABLET | Refills: 1 | Status: SHIPPED | OUTPATIENT
Start: 2022-04-25 | End: 2022-05-09

## 2022-04-26 ENCOUNTER — OFFICE VISIT (OUTPATIENT)
Dept: PHYSICAL THERAPY | Facility: CLINIC | Age: 27
End: 2022-04-26
Payer: COMMERCIAL

## 2022-04-26 DIAGNOSIS — M54.2 NECK DISCOMFORT: Primary | ICD-10-CM

## 2022-04-26 DIAGNOSIS — G89.29 CHRONIC INTRACTABLE HEADACHE, UNSPECIFIED HEADACHE TYPE: ICD-10-CM

## 2022-04-26 DIAGNOSIS — R51.9 CHRONIC INTRACTABLE HEADACHE, UNSPECIFIED HEADACHE TYPE: ICD-10-CM

## 2022-04-26 DIAGNOSIS — G43.109 MIGRAINE WITH AURA AND WITHOUT STATUS MIGRAINOSUS, NOT INTRACTABLE: ICD-10-CM

## 2022-04-26 PROCEDURE — 97140 MANUAL THERAPY 1/> REGIONS: CPT

## 2022-04-26 PROCEDURE — 97112 NEUROMUSCULAR REEDUCATION: CPT

## 2022-04-26 PROCEDURE — 97110 THERAPEUTIC EXERCISES: CPT

## 2022-04-26 NOTE — PROGRESS NOTES
Daily Note     Today's date: 2022  Patient name: Elmo Boggs  : 1995  MRN: [id number]  Referring provider: Flip Cardona MD  Dx:   Encounter Diagnosis     ICD-10-CM    1  Neck discomfort  M54 2    2  Chronic intractable headache, unspecified headache type  R51 9     G89 29    3  Migraine with aura and without status migrainosus, not intractable  G43  109                   Subjective: Pt states her neck has been feeling better but notes her mid back continues to bother her  Objective: See treatment diary below      Assessment: Tolerated treatment fair  Patient would benefit from continued PT  Mild discomfort/fatigue noted w/ prone bench UE series  Mild discomfort noted w/ addition of shdr flexion/abd  Min TTP of L cervical paraspinals; moderate discomfort at BL thoracic paraspinals  Plan: Progress treatment as tolerated  Precautions: None       Discussed use of hot pack for improving muscle tension-review NV?     Manuals     C/s stretching 5'     5' CF   UT/levator STM 5'     5' CF   Sub occipital release 3'     5' CF             Prone t/s PA mobes  STM  5'     NV np                        Neuro Re-Ed          Chin tucks          Sustained c/s head lifts          TB rows BTB 2x10     HEP BTB 2 x 10    TB extensions  BTB 2x10     HEP BTB 2 x 10    TB horizontal abd          ankita single arm retraction holds          scap depressions with half rolls  15     10 15x    Prone YTWs at bench  10 ea     10 ea 10x ea                        Ther Activity          C/S towel Rotation           C/s towel extensions           Seated t/s extensions supine  Over maroon roll 10x5" supine     10x5" 10 x 5"                                                      Ther Ex          UBE 2'/2'     2'/2' 2'/2'    Db shoulder flexion 1# x10         DB shoulder abd  1# x10         UT stretch          Levator stretch           Door pec stretch Modalities

## 2022-05-03 ENCOUNTER — OFFICE VISIT (OUTPATIENT)
Dept: PHYSICAL THERAPY | Facility: CLINIC | Age: 27
End: 2022-05-03
Payer: COMMERCIAL

## 2022-05-03 DIAGNOSIS — G89.29 CHRONIC INTRACTABLE HEADACHE, UNSPECIFIED HEADACHE TYPE: ICD-10-CM

## 2022-05-03 DIAGNOSIS — R51.9 CHRONIC INTRACTABLE HEADACHE, UNSPECIFIED HEADACHE TYPE: ICD-10-CM

## 2022-05-03 DIAGNOSIS — M54.2 NECK DISCOMFORT: Primary | ICD-10-CM

## 2022-05-03 DIAGNOSIS — G43.109 MIGRAINE WITH AURA AND WITHOUT STATUS MIGRAINOSUS, NOT INTRACTABLE: ICD-10-CM

## 2022-05-03 PROCEDURE — 97530 THERAPEUTIC ACTIVITIES: CPT | Performed by: PHYSICAL THERAPIST

## 2022-05-03 PROCEDURE — 97112 NEUROMUSCULAR REEDUCATION: CPT | Performed by: PHYSICAL THERAPIST

## 2022-05-03 PROCEDURE — 97140 MANUAL THERAPY 1/> REGIONS: CPT | Performed by: PHYSICAL THERAPIST

## 2022-05-03 NOTE — PROGRESS NOTES
Daily Note     Today's date: 5/3/2022  Patient name: Vicki Salazar  : 1995  MRN: [id number]  Referring provider: Aleida Cedeno MD  Dx:   Encounter Diagnosis     ICD-10-CM    1  Neck discomfort  M54 2    2  Chronic intractable headache, unspecified headache type  R51 9     G89 29    3  Migraine with aura and without status migrainosus, not intractable  G43  109                   Subjective: Pt states she woke up with a headache today and it woke her out of her sleep  Objective: See treatment diary below      Assessment: Tolerated treatment well  Patient would benefit from continued PT  Patient tolerates session well  She has no pain with manuals or resisted exercises  Most fatigued with bench YTW  She finds relief with manuals to c/s and reports improved pain/headache after session  Plan: Progress treatment as tolerated  Precautions: None       Discussed use of hot pack for improving muscle tension-review NV?     Manuals 4/26 5/3    4/14 4/18    C/s stretching 5' 5'    5' CF   UT/levator STM 5' 5'    5' CF   Sub occipital release 3' 5'    5' CF             Prone t/s PA mobes  STM  5' 5'    NV np                        Neuro Re-Ed          Chin tucks          Sustained c/s head lifts          TB rows BTB 2x10 B tb 20x    HEP BTB 2 x 10    TB extensions  BTB 2x10 B TB 20x     HEP BTB 2 x 10    TB high rows shoulder height   NV        TB horizontal abd  B Tb 20x        ankita single arm retraction holds  NV        scap depressions with half rolls  15 15x     10 15x    Prone YTWs at bench  10 ea 10x ea     10 ea 10x ea                        Ther Activity          C/S towel Rotation           C/s towel extensions           Seated t/s extensions supine  Over maroon roll 10x5" supine     10x5" 10 x 5"                                                      Ther Ex          UBE 2'/2' 2'/2'    2'/2' 2'/2'    Db shoulder flexion 1# x10         DB shoulder abd  1# x10         UT stretch Levator stretch           Door pec stretch                                                             Modalities

## 2022-05-05 ENCOUNTER — EVALUATION (OUTPATIENT)
Dept: PHYSICAL THERAPY | Facility: CLINIC | Age: 27
End: 2022-05-05
Payer: COMMERCIAL

## 2022-05-05 DIAGNOSIS — R51.9 CHRONIC INTRACTABLE HEADACHE, UNSPECIFIED HEADACHE TYPE: ICD-10-CM

## 2022-05-05 DIAGNOSIS — M54.2 NECK DISCOMFORT: Primary | ICD-10-CM

## 2022-05-05 DIAGNOSIS — G43.109 MIGRAINE WITH AURA AND WITHOUT STATUS MIGRAINOSUS, NOT INTRACTABLE: ICD-10-CM

## 2022-05-05 DIAGNOSIS — G89.29 CHRONIC INTRACTABLE HEADACHE, UNSPECIFIED HEADACHE TYPE: ICD-10-CM

## 2022-05-05 PROCEDURE — 97530 THERAPEUTIC ACTIVITIES: CPT | Performed by: PHYSICAL THERAPIST

## 2022-05-05 PROCEDURE — 97140 MANUAL THERAPY 1/> REGIONS: CPT | Performed by: PHYSICAL THERAPIST

## 2022-05-05 PROCEDURE — 97112 NEUROMUSCULAR REEDUCATION: CPT | Performed by: PHYSICAL THERAPIST
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
## 2022-05-11 ENCOUNTER — TELEPHONE (OUTPATIENT)
Dept: PHYSICAL THERAPY | Facility: CLINIC | Age: 27
End: 2022-05-11

## 2022-05-13 DIAGNOSIS — Z30.41 ENCOUNTER FOR SURVEILLANCE OF CONTRACEPTIVE PILLS: Primary | ICD-10-CM

## 2022-05-13 RX ORDER — DROSPIRENONE AND ETHINYL ESTRADIOL 0.02-3(28)
1 KIT ORAL DAILY
Qty: 90 TABLET | Refills: 1 | Status: SHIPPED | OUTPATIENT
Start: 2022-05-13

## 2022-05-13 NOTE — TELEPHONE ENCOUNTER
Spoke with patient  States does not have with aura  Is being treated for migraines currently with PT  States MAYBE 2 x in her life she has had migraines so bad that it affects her eyes, but denies visual changes otherwise  Aware will let provider know

## 2022-05-13 NOTE — TELEPHONE ENCOUNTER
Pt would like to go back the same birth control she was on before  Said she stopped taking it because she was no longer sexually active so is requesting sooner than later   Pt is scheduled for an annual visit in Sept

## 2022-05-13 NOTE — TELEPHONE ENCOUNTER
Reviewed with patient  Wants generic for lashonda  Reviewed startup and backup for first month after starting   Patient aware will send in 3 months and to call once she is close to end to let us know how she is feeling and will call for refills until annual in september

## 2022-05-18 ENCOUNTER — TELEPHONE (OUTPATIENT)
Dept: PHYSICAL THERAPY | Facility: CLINIC | Age: 27
End: 2022-05-18

## 2022-05-18 NOTE — TELEPHONE ENCOUNTER
Call placed to pt re: missing appt today at 12 pm  LM and informed pt she has appt tomorrow 5/19 at 8:15am      Miya Best, PT

## 2022-05-23 ENCOUNTER — APPOINTMENT (OUTPATIENT)
Dept: PHYSICAL THERAPY | Facility: CLINIC | Age: 27
End: 2022-05-23
Payer: COMMERCIAL

## 2022-05-25 NOTE — PROGRESS NOTES
Patient Discharge    Patient has no showed for last 3 appointments  Patient has been called to check-in with no follow up  She will be DC at this time and may return if/when needed      Mackenzie Brooke, PT  05/25/22

## 2022-06-27 ENCOUNTER — TELEPHONE (OUTPATIENT)
Dept: FAMILY MEDICINE CLINIC | Facility: CLINIC | Age: 27
End: 2022-06-27

## 2022-06-27 DIAGNOSIS — N39.0 ACUTE UTI: Primary | ICD-10-CM

## 2022-06-27 RX ORDER — NITROFURANTOIN 25; 75 MG/1; MG/1
100 CAPSULE ORAL 2 TIMES DAILY
COMMUNITY
End: 2022-06-27 | Stop reason: SDUPTHER

## 2022-06-27 RX ORDER — NITROFURANTOIN 25; 75 MG/1; MG/1
100 CAPSULE ORAL 2 TIMES DAILY
Qty: 14 CAPSULE | Refills: 0 | Status: SHIPPED | OUTPATIENT
Start: 2022-06-27

## 2022-06-27 NOTE — TELEPHONE ENCOUNTER
Has uti symptoms over the weekend, used otc did not help  Urgency and burning, uncomfortable  Can you call something in for her?     KELLIE    Pharmacy - [phone]    Patient ph # [phone]

## 2022-07-12 NOTE — PROGRESS NOTES
After epidural placement, FHT became category 2 with recurrent deep variable decelerations  Maternal repositioning, O2 and fluid bolus was given  Forebag was ruptured and the patient was internalized with FSE and IUPC  Amnioinfusion was started with plans of giving a 500cc bolus  Deep variables persisted, so terbutaline was given  After this, the  [address] improved  Throughout this time, patient and family were counseled on the possibility of needing  section if we felt it was no longer safe for the fetus to tolerate labor  Risks were explained  Patient and family members had the opportunity to ask questions  All were in agreement with the plan  Dr Shahzad Byers was involved by phone  Given FHT improvement, will continue expectant management for now with a low threshold for proceeding with 1LTCS  Writer returned patient's call and patient wanted to update Itzel and staff regarding her shoulder. She is also inquiring if she may have another injection it that shoulder. Writer advised patient she has to wait 3 months between injections, so she cannot have another one until after 9/15/22. Patient states she still cannot sleep on that side and has pain while raising her arm. Patient states it is about 1/2 way down her upper arm to her elbow that hurts. Patient would like to know if there is anything else she can do?

## 2022-08-17 ENCOUNTER — TELEPHONE (OUTPATIENT)
Dept: OBGYN CLINIC | Facility: CLINIC | Age: 27
End: 2022-08-17

## 2022-08-17 DIAGNOSIS — B37.9 YEAST INFECTION: Primary | ICD-10-CM

## 2022-08-17 RX ORDER — FLUCONAZOLE 150 MG/1
150 TABLET ORAL ONCE
Qty: 1 TABLET | Refills: 0 | Status: SHIPPED | OUTPATIENT
Start: 2022-08-17 | End: 2022-08-17

## 2022-08-17 NOTE — TELEPHONE ENCOUNTER
Pt states 2 days of itching, irritation and thick discharge similar to previous yeast infection symptoms  Pt states she has been using monistat and has been noticing improvement but in the past has had to have diflucan to fully resolve the yeast infection  Pt confirms [facility] pharmacy on file  Script sent to provider to sign

## 2022-08-19 ENCOUNTER — TELEPHONE (OUTPATIENT)
Dept: OBGYN CLINIC | Facility: CLINIC | Age: 27
End: 2022-08-19

## 2022-08-19 DIAGNOSIS — B37.9 YEAST INFECTION: Primary | ICD-10-CM

## 2022-08-19 RX ORDER — FLUCONAZOLE 150 MG/1
150 TABLET ORAL ONCE
Qty: 1 TABLET | Refills: 0 | Status: SHIPPED | OUTPATIENT
Start: 2022-08-19 | End: 2022-08-19

## 2022-08-19 NOTE — TELEPHONE ENCOUNTER
Pt lmom - took diflucan for yeast infection but wasn't sure how soon it should be working because woke up this morning with more itching than it was a few days ago so might be getting worse   Knows usually when this happens she gets a second dose

## 2022-09-02 ENCOUNTER — TELEPHONE (OUTPATIENT)
Dept: OBGYN CLINIC | Facility: CLINIC | Age: 27
End: 2022-09-02

## 2022-09-02 NOTE — TELEPHONE ENCOUNTER
Pt states lmp 7/29, took multiple UPT and definitely positive per pt  Pt denies any bleeding, spotting, concerns at this time  Apt offered and scheduled  [employer] message sent to pt with nob paperwork  Pt aware if any questions or concerns prior to apt to call

## 2022-09-14 ENCOUNTER — TELEPHONE (OUTPATIENT)
Dept: OBGYN CLINIC | Facility: CLINIC | Age: 27
End: 2022-09-14

## 2022-09-14 NOTE — TELEPHONE ENCOUNTER
Pt lmom - a few weeks ago had gotten an antibiotic for a yeast infection and did completely clear however recently found out was pregnant and thinks threw off ph levels and is coming back  Not sure if can get the same medication or something different because of pregnancy now

## 2022-10-05 NOTE — PROGRESS NOTES
Pt presents for initial OB appointment  Pap/ PE and cx's to be done at next visit  MRSA: denies   Varicella: unsure   STD history: denies   Drug/alcohol use history:   Slip written for initial OB panel plus Varicella    US done in office today: TVUS CRL c/w 8w5d + FHR 177bpm, CRL dates by LMP by approx 8 days  GS c/w 9w4d   Will adjust KHOI to 23 by [address] today  Pt states menses are longer, typically q 30-40 days    Pt does have a h/o drug use  As a teen and in her early 25s she admits to prescription pill abuse including benzos and opioids  She has not used drugs since 2017  PMHX:abnormal pap, bipolar 2 d/o, migraine without aura,  Depression/anxiety-pt unmediated in pregnancy and will monitor her sx  She was able to maintain control of sx without meds in her last pregnancy  Pt does note migraines have worsened in pregnancy  We reviewed   OTC tx options  Advised addition of Mg and Riboflavin OTC to help limit recurrence  POBHX: SAB x 1     PSURGHX:denies       All questions answered  Ultrasound Probe Disinfection    A transvaginal ultrasound was performed     Probe identification: probe serial number [employer]: [id number]  Disinfection process: Disinfection was performed with High Level Disinfection Process (Trophon)    Khadijah Huang PA-C

## 2022-10-06 ENCOUNTER — INITIAL PRENATAL (OUTPATIENT)
Dept: OBGYN CLINIC | Facility: CLINIC | Age: 27
End: 2022-10-06

## 2022-10-06 ENCOUNTER — TELEPHONE (OUTPATIENT)
Dept: OBGYN CLINIC | Facility: CLINIC | Age: 27
End: 2022-10-06

## 2022-10-06 VITALS
SYSTOLIC BLOOD PRESSURE: 124 MMHG | DIASTOLIC BLOOD PRESSURE: 82 MMHG | HEIGHT: 65 IN | BODY MASS INDEX: 27.22 KG/M2 | WEIGHT: 163.4 LBS

## 2022-10-06 DIAGNOSIS — Z34.81 MULTIGRAVIDA IN FIRST TRIMESTER: Primary | ICD-10-CM

## 2022-10-06 PROCEDURE — PNV: Performed by: PHYSICIAN ASSISTANT

## 2022-10-07 NOTE — TELEPHONE ENCOUNTER
Pt offered an appt on 11/9 in [place] for the 2nd part of her NOB  Patient unable to go to [place] as she will have state insurance by then  Patient prefers thurs appts anytime in [facility] or [facility]  Told her we will keep looking at the schedule

## 2022-10-17 ENCOUNTER — TELEPHONE (OUTPATIENT)
Dept: OBGYN CLINIC | Facility: CLINIC | Age: 27
End: 2022-10-17

## 2022-11-02 ENCOUNTER — APPOINTMENT (OUTPATIENT)
Dept: LAB | Facility: CLINIC | Age: 27
End: 2022-11-02

## 2022-11-02 DIAGNOSIS — Z34.81 MULTIGRAVIDA IN FIRST TRIMESTER: ICD-10-CM

## 2022-11-02 PROBLEM — Z34.90 ENCOUNTER FOR INDUCTION OF LABOR: Status: RESOLVED | Noted: 2018-04-25 | Resolved: 2022-11-02

## 2022-11-02 PROBLEM — Z01.419 ROUTINE GYNECOLOGICAL EXAMINATION: Status: RESOLVED | Noted: 2019-08-06 | Resolved: 2022-11-02

## 2022-11-02 PROBLEM — Z3A.41 41 WEEKS GESTATION OF PREGNANCY: Status: RESOLVED | Noted: 2018-04-25 | Resolved: 2022-11-02

## 2022-11-02 PROBLEM — Z01.419 GYNECOLOGIC EXAM NORMAL: Status: RESOLVED | Noted: 2020-09-10 | Resolved: 2022-11-02

## 2022-11-02 PROBLEM — O48.0 41 WEEKS GESTATION OF PREGNANCY: Status: RESOLVED | Noted: 2018-04-25 | Resolved: 2022-11-02

## 2022-11-02 LAB
ABO GROUP BLD: NORMAL
AMPHETAMINES SERPL QL SCN: NEGATIVE
BACTERIA UR QL AUTO: ABNORMAL /HPF
BARBITURATES UR QL: NEGATIVE
BASOPHILS # BLD AUTO: 0.02 THOUSANDS/ÂΜL (ref 0–0.1)
BASOPHILS NFR BLD AUTO: 0 % (ref 0–1)
BENZODIAZ UR QL: NEGATIVE
BILIRUB UR QL STRIP: NEGATIVE
BLD GP AB SCN SERPL QL: NEGATIVE
CLARITY UR: CLEAR
COCAINE UR QL: NEGATIVE
COLOR UR: YELLOW
EOSINOPHIL # BLD AUTO: 0.03 THOUSAND/ÂΜL (ref 0–0.61)
EOSINOPHIL NFR BLD AUTO: 0 % (ref 0–6)
ERYTHROCYTE [DISTWIDTH] IN BLOOD BY AUTOMATED COUNT: 12.5 % (ref 11.6–15.1)
GLUCOSE UR STRIP-MCNC: NEGATIVE MG/DL
HBV SURFACE AG SER QL: NORMAL
HCT VFR BLD AUTO: 34.6 % (ref 34.8–46.1)
HCV AB SER QL: NORMAL
HGB BLD-MCNC: 11.6 G/DL (ref 11.5–15.4)
HGB UR QL STRIP.AUTO: ABNORMAL
IMM GRANULOCYTES # BLD AUTO: 0.04 THOUSAND/UL (ref 0–0.2)
IMM GRANULOCYTES NFR BLD AUTO: 1 % (ref 0–2)
KETONES UR STRIP-MCNC: ABNORMAL MG/DL
LEUKOCYTE ESTERASE UR QL STRIP: NEGATIVE
LYMPHOCYTES # BLD AUTO: 1.39 THOUSANDS/ÂΜL (ref 0.6–4.47)
LYMPHOCYTES NFR BLD AUTO: 21 % (ref 14–44)
MCH RBC QN AUTO: 30.7 PG (ref 26.8–34.3)
MCHC RBC AUTO-ENTMCNC: 33.5 G/DL (ref 31.4–37.4)
MCV RBC AUTO: 92 FL (ref 82–98)
METHADONE UR QL: NEGATIVE
MONOCYTES # BLD AUTO: 0.49 THOUSAND/ÂΜL (ref 0.17–1.22)
MONOCYTES NFR BLD AUTO: 7 % (ref 4–12)
MUCOUS THREADS UR QL AUTO: ABNORMAL
NEUTROPHILS # BLD AUTO: 4.76 THOUSANDS/ÂΜL (ref 1.85–7.62)
NEUTS SEG NFR BLD AUTO: 71 % (ref 43–75)
NITRITE UR QL STRIP: NEGATIVE
NON-SQ EPI CELLS URNS QL MICRO: ABNORMAL /HPF
NRBC BLD AUTO-RTO: 0 /100 WBCS
OPIATES UR QL SCN: NEGATIVE
OXYCODONE+OXYMORPHONE UR QL SCN: NEGATIVE
PCP UR QL: NEGATIVE
PH UR STRIP.AUTO: 5.5 [PH]
PLATELET # BLD AUTO: 222 THOUSANDS/UL (ref 149–390)
PMV BLD AUTO: 10.7 FL (ref 8.9–12.7)
PROT UR STRIP-MCNC: ABNORMAL MG/DL
RBC # BLD AUTO: 3.78 MILLION/UL (ref 3.81–5.12)
RBC #/AREA URNS AUTO: ABNORMAL /HPF
RH BLD: POSITIVE
RPR SER QL: NORMAL
RUBV IGG SERPL IA-ACNC: 78.1 IU/ML
SP GR UR STRIP.AUTO: 1.03 (ref 1–1.03)
SPECIMEN EXPIRATION DATE: NORMAL
THC UR QL: NEGATIVE
UROBILINOGEN UR STRIP-ACNC: <2 MG/DL
VZV IGG SER QL IA: NORMAL
WBC # BLD AUTO: 6.73 THOUSAND/UL (ref 4.31–10.16)
WBC #/AREA URNS AUTO: ABNORMAL /HPF

## 2022-11-02 NOTE — PATIENT INSTRUCTIONS
You elected to have non-invasive screening for genetic syndrome performed for your pregnancy  This involves a blood test to check for the four most common genetic syndromes (Trisomy 21, Trisomy 13, Trisomy 25, and sex chromosome abnormalities)  It also will report the biologic sex of the fetus  Results will be visible in your [employer] portal 7-10 business days from when the test is drawn  Please follow all instructions regarding determining out of pocket cost for the test (as provided by our nursing staff today), as well as follow any instructions regarding need for prior authorization before having the test drawn  Please contact our office with any concerns or questions  You will need spina bifida screening (called MSAFP) for the baby beginning at 15 weeks gestation, which will be ordered by your obstetrician's office  This test allows for earlier detection of spina bifida than is possible by ultrasound, and is advised in all pregnancies

## 2022-11-03 ENCOUNTER — ROUTINE PRENATAL (OUTPATIENT)
Dept: PERINATAL CARE | Facility: CLINIC | Age: 27
End: 2022-11-03

## 2022-11-03 VITALS
SYSTOLIC BLOOD PRESSURE: 104 MMHG | HEART RATE: 75 BPM | HEIGHT: 65 IN | BODY MASS INDEX: 26.59 KG/M2 | DIASTOLIC BLOOD PRESSURE: 68 MMHG | WEIGHT: 159.6 LBS

## 2022-11-03 DIAGNOSIS — Z3A.12 12 WEEKS GESTATION OF PREGNANCY: ICD-10-CM

## 2022-11-03 DIAGNOSIS — Z36.82 ENCOUNTER FOR (NT) NUCHAL TRANSLUCENCY SCAN: Primary | ICD-10-CM

## 2022-11-03 DIAGNOSIS — Z34.81 MULTIGRAVIDA IN FIRST TRIMESTER: ICD-10-CM

## 2022-11-03 PROBLEM — B37.31 YEAST VAGINITIS: Status: RESOLVED | Noted: 2019-03-08 | Resolved: 2022-11-03

## 2022-11-03 LAB — HIV 1+2 AB+HIV1 P24 AG SERPL QL IA: NORMAL

## 2022-11-03 RX ORDER — D-METHORPHAN/PE/ACETAMINOPHEN 10-5-325MG
CAPSULE ORAL
COMMUNITY

## 2022-11-03 NOTE — PROGRESS NOTES
Patient chose to have [facility] Non-invasive Prenatal Screen [facility] sex  Patient given brochure and is aware [facility] will contact patients insurance and coordinate coverage  Patient made aware she will need to respond to text message or e-mail from [employer] within 2 business days or testing will be run through insurance  Patient informed text message will come from area code  "415"  Provided [employer] # [phone] and web site : [email or web address] [email or web address]  "[place] your test online" card with barcode and test tube ID provided to patient  Reviewed [facility]'s web site states 5-7 business days for results via their portal  [facility] states 7-10 business days for results to be in [facility] [address]  A [employer] message will be sent once we receive results  2 vials of blood drawn from right arm by Isael Oh  Patient tolerated blood draw without difficulty  Specimens labeled with patient identifiers (name, date of birth, specimen collection date), order and specimen was verified with patient, packed and sent via [employer]  Copy of lab order scanned to Epic media  [facility] will have results in approximately 7-10 business days and will call patient or notify via [address]  Patient aware viewing lab result online will reveal fetal sex if ordered  Patient verbalized understanding of all instructions and no questions at this time

## 2022-11-03 NOTE — LETTER
November 3, 2022     Karly Kemp PA-C  [address]  [facility] [address] [address]    Patient: Ni Robles   YOB: 1995   Date of Visit: 11/3/2022       Dear Dr Savi De Los Santos: Thank you for referring Roge Mccurdy to me for evaluation  Below are my notes for this consultation  If you have questions, please do not hesitate to call me  I look forward to following your patient along with you  Sincerely,        Oksana Villeda MD        CC: No Recipients  Oksana Villeda MD  11/3/2022  7:57 PM  Sign when Signing Elis Acosta      Dear Ms  Haim,    Thank you for requesting a  consultation on your patient Ms Hayley Infante for the following indications:  Genetic screening    History  Medications: Prenatal Vitamins   Allergies to medications: NKDA  Shellfish allergy  Past medical history: migraines and bipolar disease (not on medication and feels good)  Past surgical history: Denies  Past obstetrical history:   #1 2017 spontaneous miscarriage that did not require surgical intervention  #2 2018 term vaginal delivery of a male weighing 7#11oz  She denies pregnancy complications  # 3 current  Social history:  Denies alcohol, tobacco or drug use  First generation family history:  Significant for possible breast cancer in her mother at the age of 36  She is unable to get accurate history  Her epic chart reports a history of a Cleft lip and palate noted in the mother of the FOB  Her epic chart reports that her father has Pompe’s disease but that she has met with genetic counseling and the likelyhood of having a child with a similar condition is low  Ultrasound findings: The ultrasound shows a fetus concordant with dates  The nasal bone and nuchal translucency appear normal  No malformations are seen on today's early ultrasound       The patient was informed of the findings and counseled about the limitations of the exam in detecting all forms of fetal congenital abnormalities  She does not report any vaginal bleeding or uterine cramping or contractions  Specific counseling was provided on the following problems:  1  We discussed the options for genetic screening which include invasive testing on the fetal placenta or on fetal skin cells within the amniotic fluid and compared this to noninvasive testing which includes cell free DNA screening and the sequential screen  We reviewed the risks, the benefits and the limitations of each  In the end patient chose to complete the cell free DNA screen  2  She completed her COVID series  She was encouraged to get her Flu vaccine at next [facility] appointment  Future tests recommended: The results of her NIPT will return in 7-10 days and her OB office will order an MSAFP screen at 16-18 weeks to screen for spina bifida  Future ultrasounds ordered today:   Fetal Level II ultrasound imaging is recommended at 19-20 weeks' gestation-scheduled 12/29/22  The total time dedicated to this patient encounter was 15 minutes (5 preparation, 5 face-to-face, and 5 documenting)  Split-shared decision-making between [employer] and myself was utilized, with the majority of the time spent by [employer]  I supervised the Advanced Practitioner, and we discussed the care of this patient before she saw the patient independently while I was present in the office  I reviewed the note, her ultrasound pictures and agree          Rosy Colon MD

## 2022-11-03 NOTE — PROGRESS NOTES
Deirdre Cruz      Dear Ms Whitmore,    Thank you for requesting a  consultation on your patient Ms Soniya Kitchen for the following indications:  Genetic screening    History  Medications: Prenatal Vitamins   Allergies to medications: NKDA  Shellfish allergy  Past medical history: migraines and bipolar disease (not on medication and feels good)  Past surgical history: Denies  Past obstetrical history:   #1 2017 spontaneous miscarriage that did not require surgical intervention  #2 2018 term vaginal delivery of a male weighing 7#11oz  She denies pregnancy complications  # 3 current  Social history:  Denies alcohol, tobacco or drug use  First generation family history:  Significant for possible breast cancer in her mother at the age of 36  She is unable to get accurate history  Her epic chart reports a history of a Cleft lip and palate noted in the mother of the FOB  Her epic chart reports that her father has Pompe’s disease but that she has met with genetic counseling and the likelyhood of having a child with a similar condition is low  Ultrasound findings: The ultrasound shows a fetus concordant with dates  The nasal bone and nuchal translucency appear normal  No malformations are seen on today's early ultrasound  The patient was informed of the findings and counseled about the limitations of the exam in detecting all forms of fetal congenital abnormalities  She does not report any vaginal bleeding or uterine cramping or contractions  Specific counseling was provided on the following problems:  1  We discussed the options for genetic screening which include invasive testing on the fetal placenta or on fetal skin cells within the amniotic fluid and compared this to noninvasive testing which includes cell free DNA screening and the sequential screen  We reviewed the risks, the benefits and the limitations of each  In the end patient chose to complete the cell free DNA screen  2  She completed her COVID series  She was encouraged to get her Flu vaccine at next [facility] appointment  Future tests recommended: The results of her NIPT will return in 7-10 days and her OB office will order an MSAFP screen at 16-18 weeks to screen for spina bifida  Future ultrasounds ordered today:   Fetal Level II ultrasound imaging is recommended at 19-20 weeks' gestation-scheduled 12/29/22  The total time dedicated to this patient encounter was 15 minutes (5 preparation, 5 face-to-face, and 5 documenting)  Split-shared decision-making between [employer] and myself was utilized, with the majority of the time spent by ReferralMD  I supervised the Advanced Practitioner, and we discussed the care of this patient before she saw the patient independently while I was present in the office  I reviewed the note, her ultrasound pictures and agree          Sydnie Jama MD

## 2022-11-04 ENCOUNTER — TELEPHONE (OUTPATIENT)
Dept: OBGYN CLINIC | Facility: CLINIC | Age: 27
End: 2022-11-04

## 2022-11-04 DIAGNOSIS — O23.41 URINARY TRACT INFECTION IN MOTHER DURING FIRST TRIMESTER OF PREGNANCY: Primary | ICD-10-CM

## 2022-11-04 LAB
BACTERIA UR CULT: ABNORMAL
BACTERIA UR CULT: ABNORMAL

## 2022-11-04 RX ORDER — CEPHALEXIN 500 MG/1
500 CAPSULE ORAL 2 TIMES DAILY
Qty: 14 CAPSULE | Refills: 0 | Status: SHIPPED | OUTPATIENT
Start: 2022-11-04 | End: 2022-11-11

## 2022-11-04 NOTE — TELEPHONE ENCOUNTER
Reviewed completed results with pt  Pt has no questions at this time  Pt states she has been having increasing urinary discomfort and pressure  Pt denies any lower back pain or burning with urination but reports increased pressure, urge to urinate and discomfort  Reviewed recommendations and medication instructions with pt  Pt verbalizes understanding and confirms [facility] pharmacy on file, script sent to provider

## 2022-11-04 NOTE — TELEPHONE ENCOUNTER
Pt lmom - has appt next week but did blood work and urine test and saw some stuff came back in urine  Has been complaining for awhile now about being really uncomfortable in bladder area and having some pressure and now starting to get uncomfortable, sharp pain and believes from infection  Wasn't sure if waiting to go over this at appt and give something to treat or if could talk to someone about it today as has been uncomfortable last few weeks

## 2022-11-05 LAB
HGB A MFR BLD: 2.7 % (ref 1.8–3.2)
HGB A MFR BLD: 97.3 % (ref 96.4–98.8)
HGB F MFR BLD: 0 % (ref 0–2)
HGB FRACT BLD-IMP: NORMAL
HGB S MFR BLD: 0 %

## 2022-11-09 ENCOUNTER — INITIAL PRENATAL (OUTPATIENT)
Dept: OBGYN CLINIC | Facility: CLINIC | Age: 27
End: 2022-11-09

## 2022-11-09 VITALS — SYSTOLIC BLOOD PRESSURE: 112 MMHG | BODY MASS INDEX: 26.46 KG/M2 | WEIGHT: 159 LBS | DIASTOLIC BLOOD PRESSURE: 72 MMHG

## 2022-11-09 DIAGNOSIS — B37.31 VAGINAL CANDIDIASIS: ICD-10-CM

## 2022-11-09 DIAGNOSIS — Z3A.13 13 WEEKS GESTATION OF PREGNANCY: ICD-10-CM

## 2022-11-09 DIAGNOSIS — Z34.91 PRENATAL CARE IN FIRST TRIMESTER: Primary | ICD-10-CM

## 2022-11-09 NOTE — PATIENT INSTRUCTIONS
Pregnancy at 11 to 14 Weeks   AMBULATORY CARE:   Changes happening to your body: You are now at the end of your first trimester and entering your second trimester  Morning sickness usually goes away by this time  You may have other symptoms such as fatigue, frequent urination, and headaches  You may have gained 2 to 4 pounds by now  Seek care immediately if:   You have pain or cramping in your abdomen or low back  You have heavy vaginal bleeding or clotting  You pass material that looks like tissue or large clots  Collect the material and bring it with you  Call your doctor or obstetrician if:   You cannot keep food or drinks down, and you are losing weight  You have light vaginal bleeding  You have chills or a fever  You have vaginal itching, burning, or pain  You have yellow, green, white, or foul-smelling vaginal discharge  You have pain or burning when you urinate, less urine than usual, or pink or bloody urine  You have questions or concerns about your condition or care  How to care for yourself at this stage of your pregnancy:       Get plenty of rest   You may feel more tired than normal  You may need to take naps or go to bed earlier  Manage nausea and vomiting  Avoid fatty and spicy foods  Eat small meals throughout the day instead of large meals  Darling may help to decrease nausea  Ask your healthcare provider about other ways of decreasing nausea and vomiting  Eat a variety of healthy foods  Healthy foods include fruits, vegetables, whole-grain breads, low-fat dairy foods, beans, lean meats, and fish  Drink liquids as directed  Ask how much liquid to drink each day and which liquids are best for you  Limit caffeine to less than 200 milligrams each day  Limit your intake of fish to 2 servings each week  Choose fish low in mercury such as canned light tuna, shrimp, salmon, cod, or tilapia   Do not  eat fish high in mercury such as swordfish, tilefish, norma mackerel, and shark  Take prenatal vitamins as directed  Your need for certain vitamins and minerals, such as folic acid, increases during pregnancy  Prenatal vitamins provide some of the extra vitamins and minerals you need  Prenatal vitamins may also help to decrease the risk of certain birth defects  Do not smoke  Smoking increases your risk of a miscarriage and other health problems during your pregnancy  Smoking can cause your baby to be born too early or weigh less at birth  Ask your healthcare provider for information if you need help quitting  Do not drink alcohol  Alcohol passes from your body to your baby through the placenta  It can affect your baby's brain development and cause fetal alcohol syndrome (FAS)  FAS is a group of conditions that causes mental, behavior, and growth problems  Talk to your healthcare provider before you take any medicines  Many medicines may harm your baby if you take them when you are pregnant  Do not take any medicines, vitamins, herbs, or supplements without first talking to your healthcare provider  Never use illegal or street drugs (such as marijuana or cocaine) while you are pregnant  Safety tips during pregnancy:   Avoid hot tubs and saunas  Do not use a hot tub or sauna while you are pregnant, especially during your first trimester  Hot tubs and saunas may raise your baby's temperature and increase the risk of birth defects  Avoid toxoplasmosis  This is an infection caused by eating raw meat or being around infected cat feces  It can cause birth defects, miscarriages, and other problems  Wash your hands after you touch raw meat  Make sure any meat is well-cooked before you eat it  Avoid raw eggs and unpasteurized milk  Use gloves or ask someone else to clean your cat's litter box while you are pregnant  Changes happening with your baby: Your baby has fully formed fingernails and toenails  Your baby's heartbeat can now be heard   Ask your healthcare provider if you can listen to your baby's heartbeat  By week 14, your baby is over 4 inches long from the top of the head to the rump (baby's bottom)  Your baby weighs over 3 ounces  Prenatal care:  Prenatal care is a series of visits with your healthcare provider throughout your pregnancy  During the first 28 weeks of your pregnancy, you will see your healthcare provider 1 time each month  Prenatal care can help prevent problems during pregnancy and childbirth  Your healthcare provider will check your blood pressure and weight  Your baby's heart rate will also be checked  You may also need the following at some visits:  A pelvic exam  allows your healthcare provider to see your cervix (the bottom part of your uterus)  Your healthcare provider will use a speculum to open your vagina  He or she will check the size and shape of your uterus  Blood tests  may be done to check for any of the following:    Gestational diabetes or anemia (low iron level)    Blood type or Rh factor, or certain birth defects    Immunity to certain diseases, such as chickenpox or rubella    An infection, such as a sexually transmitted infection, HIV, or hepatitis B    Hepatitis B  may need to be prevented or treated  Hepatitis B is inflammation of the liver caused by the hepatitis B virus (HBV)  HBV can spread from a mother to her baby during delivery  You will be checked for HBV as early as possible in the first trimester of each pregnancy  You need the test even if you received the hepatitis B vaccine or were tested before  You may need to have an HBV infection treated before you give birth  Urine tests  may also be done to check for sugar and protein  These can be signs of gestational diabetes or preeclampsia  Urine tests may also be done to check for signs of infection  A fetal ultrasound  shows pictures of your baby inside your uterus  The pictures are used to check your baby's development, movement, and position  Genetic disorder screening tests  may be offered to you  These tests check your baby's risk for genetic disorders such as Down syndrome  A screening test includes a blood test and ultrasound  Follow up with your doctor or obstetrician as directed:  Go to all prenatal visits  Write down your questions so you remember to ask them during your visits  © Copyright [employer] 2022 Information is for End User's use only and may not be sold, redistributed or otherwise used for commercial purposes  All illustrations and images included in CareNotes® are the copyrighted property of A D A M , Inc  or [facility] [employer]   The above information is an  only  It is not intended as medical advice for individual conditions or treatments  Talk to your doctor, nurse or pharmacist before following any medical regimen to see if it is safe and effective for you

## 2022-11-09 NOTE — PROGRESS NOTES
Mague Armstrong is a 32 yo  at 13w4d presenting for second part of NOB- denies any cramping, VB, LOF  Does note some vaginal pruritis following tx with keflex for UTI- reviewed monistat 7  Prenatal physical completed today without any significant findings  Last pap: 2021- ASCUS, HRHPV pos  Colpo: 10/2021- ECC was benign  Pap and cultures completed today with permission of patient given she continues to figure out insurance  Normal NIPS testing- please offer msafp at next visit  RTO in 4 weeks or sooner if needed

## 2022-11-10 LAB
C TRACH DNA SPEC QL NAA+PROBE: NEGATIVE
N GONORRHOEA DNA SPEC QL NAA+PROBE: NEGATIVE

## 2022-11-11 LAB
CLINICAL INFO: NORMAL
ETHNIC BACKGROUND STATED: NORMAL
GENE MUT TESTED BLD/T: NORMAL
GENERAL COMMENTS:: NORMAL
LAB DIRECTOR NAME PROVIDER: NORMAL
REASON FOR REFERRAL (NARRATIVE): NORMAL
REF LAB TEST METHOD: NORMAL
SL AMB DISCLAIMER: NORMAL
SL AMB GENETIC COUNSELOR: NORMAL
SMN1 GENE MUT ANL BLD/T: NORMAL
SPECIMEN SOURCE: NORMAL

## 2022-11-17 LAB
LAB AP GYN PRIMARY INTERPRETATION: ABNORMAL
Lab: ABNORMAL
PATH INTERP SPEC-IMP: ABNORMAL

## 2022-12-05 NOTE — PROGRESS NOTES
OB/GYN  PN Visit  Alma Ireland  [id number]  12/8/2022  1:17 PM  Alba HewittteKATEY    S: 33 y o  [id number] 17w5d here for PN visit  She has a history of abnormal Paps  Last pap: 9/2021- ASCUS, HRHPV pos  Colpo: 10/2021- ECC was benign  Vitals:    12/08/22 1200   BP: 110/78        Pt presents for prenatal check up  Patient reports some fetal movement/flutters   Denies c/o n/v/ha, no edema, no smoking, no DV  No vb/lof  No cramping/ctxns or signs of PTL  Flu: declines  Urine: -/-  MSAFP ordered  Level II US scheduled for 12/29/22    She reports migraines but they are relieved with cold compresses and tylenol  She has a PMH of migraines and was in PT for therapy   She recently stopped PT but has an appointment with her PCP to reestablish PT    RTC in 4 weeks         16-18 weeks: sequential screening, level II ultrasound order, flu vaccine  26-28 weeks: 28 week labs (CBC, RPR, 1hr GTT), Rh status/rhogam, FKC, flu vaccine  28-32 weeks: tdap, flu vaccine  32 weeks: tdap, flu vaccine, check in card, rediscuss contraception, birth plan  36 weeks: collect GBS/PCN allergy?, flu vaccine

## 2022-12-08 ENCOUNTER — ROUTINE PRENATAL (OUTPATIENT)
Dept: OBGYN CLINIC | Facility: CLINIC | Age: 27
End: 2022-12-08

## 2022-12-08 VITALS
SYSTOLIC BLOOD PRESSURE: 110 MMHG | DIASTOLIC BLOOD PRESSURE: 78 MMHG | BODY MASS INDEX: 27.02 KG/M2 | HEIGHT: 65 IN | WEIGHT: 162.2 LBS

## 2022-12-08 DIAGNOSIS — Z34.92 SECOND TRIMESTER PREGNANCY: Primary | ICD-10-CM

## 2022-12-29 ENCOUNTER — ROUTINE PRENATAL (OUTPATIENT)
Dept: PERINATAL CARE | Facility: CLINIC | Age: 27
End: 2022-12-29

## 2022-12-29 VITALS
SYSTOLIC BLOOD PRESSURE: 100 MMHG | DIASTOLIC BLOOD PRESSURE: 68 MMHG | BODY MASS INDEX: 27.79 KG/M2 | WEIGHT: 166.8 LBS | HEIGHT: 65 IN | HEART RATE: 96 BPM

## 2022-12-29 DIAGNOSIS — Z3A.20 20 WEEKS GESTATION OF PREGNANCY: ICD-10-CM

## 2022-12-29 DIAGNOSIS — Z82.79 FAMILY HISTORY OF CONGENITAL ANOMALY: Primary | ICD-10-CM

## 2022-12-29 DIAGNOSIS — Z36.86 ENCOUNTER FOR ANTENATAL SCREENING FOR CERVICAL LENGTH: ICD-10-CM

## 2022-12-29 NOTE — PROGRESS NOTES
Tran Collins  has no complaints today at 20w5d  She reports fetal movements and does not report any vaginal bleeding or signs of labor  Her recently completed fetal testing revealed a normal NIPT  She did not complete her MSAFP by today's visit  Problem list:  1  Patient's father has Pompeii's disease-she declined carrier screening  2  The father the baby has a family history of cleft palate in his mother and cleft lip and palate in his maternal uncle  Ultrasound findings: The ultrasound today shows normal interval fetal growth and fluid, normal cervical length, and no malformations were detected  Pregnancy ultrasound has limitations and is unable to detect all forms of fetal congenital abnormalities  Follow up recommended:   1  No further follow-up ultrasounds are recommended at this time        Prabhu Burgos MD

## 2022-12-29 NOTE — LETTER
December 29, 2022     Brenda Lowery PA-C  [address]  [facility] [address]    Patient: Gloria Gilliam   YOB: 1995   Date of Visit: 12/29/2022       Dear Dr Hardy Deal: Thank you for referring Kasia Aly to me for evaluation  Below are my notes for this consultation  If you have questions, please do not hesitate to call me  I look forward to following your patient along with you  Sincerely,        Darwin Luo MD        CC: No Recipients  Darwin Luo MD  12/29/2022 12:13 PM  Sign when Signing Visit  Gloria Gilliam  has no complaints today at 20w5d  She reports fetal movements and does not report any vaginal bleeding or signs of labor  Her recently completed fetal testing revealed a normal NIPT  She did not complete her MSAFP by today's visit  Problem list:  1  Patient's father has Pompeii's disease-she declined carrier screening  2  The father the baby has a family history of cleft palate in his mother and cleft lip and palate in his maternal uncle  Ultrasound findings: The ultrasound today shows normal interval fetal growth and fluid, normal cervical length, and no malformations were detected  Pregnancy ultrasound has limitations and is unable to detect all forms of fetal congenital abnormalities  Follow up recommended:   1  No further follow-up ultrasounds are recommended at this time        Darwin Luo MD

## 2022-12-29 NOTE — PROGRESS NOTES
Ultrasound Probe Disinfection    A transvaginal ultrasound was performed  Prior to use, disinfection was performed with High Level Disinfection Process (Trophon)  Probe serial number [id number]: [id number] was used        Elizabeth Teresa  12/29/22  10:27 AM

## 2023-01-15 ENCOUNTER — HOSPITAL ENCOUNTER (OUTPATIENT)
Facility: HOSPITAL | Age: 28
Discharge: HOME | End: 2023-01-15
Attending: OBSTETRICS & GYNECOLOGY | Admitting: OBSTETRICS & GYNECOLOGY

## 2023-01-15 VITALS
DIASTOLIC BLOOD PRESSURE: 70 MMHG | RESPIRATION RATE: 18 BRPM | OXYGEN SATURATION: 98 % | HEART RATE: 74 BPM | TEMPERATURE: 98.3 F | SYSTOLIC BLOOD PRESSURE: 109 MMHG

## 2023-01-15 NOTE — PROGRESS NOTES
Pt Discharged home to self care  AVS given and explained by RN  All questions answered at this time

## 2023-01-15 NOTE — PROGRESS NOTES
L&D Triage Note - OB/GYN  Leigh Jon 33 y o  female MRN: [id number]  Unit/Bed#:  TRIAGE 3-01 Encounter: [id number]      Assessment:  33 y o   at 23w1d presenting with vaginal spotting in the setting of recent sexual intercourse  Patient was determined not to be in  labor or have active bleeding on exam      Plan:  1  Vaginal spotting   NST reactive   No contractions   PTL workup negative   CL 4 03cm   Patient discharged home with outpatient follow up   D/W Dr Lisa Rodriguez    ______________________________________________________________________      Chief Complaint: Vaginal spotting    Subjective:  33 y o  Noam Kang at 23w1d presenting with vaginal spotting twice over the last 2 days in the setting of recent sexual intercourse  Patient states that she had penetrative sex two days ago  She denies urinary symptoms, profuse vaginal bleeding, contractions and decreased FM  She states that yesterday she was awoken from her sleep with a 2 minute tightening of her abdomen  She denies symptoms of vaginitis  ROS:   Review of Systems   Constitutional: Negative for chills and fever  HENT: Negative for ear pain and sore throat  Eyes: Negative for pain and visual disturbance  Respiratory: Negative for cough and shortness of breath  Cardiovascular: Negative for chest pain and palpitations  Gastrointestinal: Negative for abdominal pain and vomiting  Genitourinary: Positive for vaginal bleeding  Negative for dysuria and hematuria  Musculoskeletal: Negative for arthralgias and back pain  Skin: Negative for color change and rash  Neurological: Negative for seizures and syncope  All other systems reviewed and are negative  Objective:  BP: 109/70  Pulse: 74 bpm  Physical Exam  Constitutional:       Appearance: Normal appearance  Genitourinary:      Vulva normal    Cardiovascular:      Rate and Rhythm: Normal rate  Pulses: Normal pulses     Pulmonary:      Effort: Pulmonary effort is normal    Abdominal:      Palpations: Abdomen is soft  Tenderness: There is no abdominal tenderness  Neurological:      Mental Status: She is alert  Skin:     General: Skin is warm and dry  Psychiatric:         Mood and Affect: Mood normal          Behavior: Behavior normal    Vitals reviewed            SVE: 0 / 0% / -5  SSE: unrremarkable speculum exam, minimal vaginal discharge, minimal cervical ectropion, no active vaginal or cervical bleeding  FHT:  reactive  [place]: no contractions    Wet mount/KOH: no clue cells, hyphae or trichomonas  Rupture workup: Nitrazine negative, no Ferning, no Pooling    TVUS:    Cervical length: 4 03cm   Variable presentation   No funneling, no dynamic changes      Ora MD Donis 1/15/2023 2:39 PM

## 2023-01-15 NOTE — PROCEDURES
Milton Berumen, dajuan [id number] at 23w1d with an KHOI of 5/13/2023, by Ultrasound, was seen at [address] for the following procedure(s): $Procedure Type: US - Transvaginal]      Ultrasound Other  Fetal Presentation: Other (comment) (variable)  Cervical Length: 4 03  Funnel: No  Debris: No  Placenta Previa: No  Vasa Previa: No              Ultrasound Probe Disinfection    A transvaginal ultrasound was performed     Prior to use, disinfection was performed with High Level Disinfection Process (Trophon)    Frederick Schmidt MD  01/15/23  2:41 PM

## 2023-01-19 ENCOUNTER — ROUTINE PRENATAL (OUTPATIENT)
Dept: OBGYN CLINIC | Facility: CLINIC | Age: 28
End: 2023-01-19

## 2023-01-19 VITALS — DIASTOLIC BLOOD PRESSURE: 74 MMHG | WEIGHT: 176 LBS | BODY MASS INDEX: 29.29 KG/M2 | SYSTOLIC BLOOD PRESSURE: 110 MMHG

## 2023-01-19 DIAGNOSIS — Z34.92 PRENATAL CARE IN SECOND TRIMESTER: Primary | ICD-10-CM

## 2023-02-07 ENCOUNTER — TELEPHONE (OUTPATIENT)
Dept: OBGYN CLINIC | Facility: CLINIC | Age: 28
End: 2023-02-07

## 2023-02-07 DIAGNOSIS — K21.9 MILD ACID REFLUX: Primary | ICD-10-CM

## 2023-02-07 DIAGNOSIS — L29.9 SEVERE ITCHING: Primary | ICD-10-CM

## 2023-02-07 RX ORDER — PANTOPRAZOLE SODIUM 20 MG/1
20 TABLET, DELAYED RELEASE ORAL DAILY
Qty: 30 TABLET | Refills: 1 | Status: SHIPPED | OUTPATIENT
Start: 2023-02-07 | End: 2023-04-08

## 2023-02-07 NOTE — TELEPHONE ENCOUNTER
Patient called said she is to get her glucose testing this Thu wants to know if there is an alternative the drink due to acid reflux  Said her acid reflux is so bad she brings up acidity  Also itchy legs especially the back of her legs  Said she itches everywhere except her arms  But the back of her legs keeps her up at night and she is scratching so hard she almost has them cut open

## 2023-02-08 NOTE — TELEPHONE ENCOUNTER
Spoke with Mia Cifuentes and states medication was sent in for her to try before taking her glucola test

## 2023-02-14 ENCOUNTER — APPOINTMENT (OUTPATIENT)
Dept: LAB | Facility: CLINIC | Age: 28
End: 2023-02-14

## 2023-02-14 DIAGNOSIS — Z34.92 PRENATAL CARE IN SECOND TRIMESTER: ICD-10-CM

## 2023-02-14 DIAGNOSIS — L29.9 SEVERE ITCHING: ICD-10-CM

## 2023-02-14 LAB
ALBUMIN SERPL BCP-MCNC: 3.4 G/DL (ref 3.5–5)
ALP SERPL-CCNC: 66 U/L (ref 34–104)
ALT SERPL W P-5'-P-CCNC: 10 U/L (ref 7–52)
ANION GAP SERPL CALCULATED.3IONS-SCNC: 6 MMOL/L (ref 4–13)
AST SERPL W P-5'-P-CCNC: 12 U/L (ref 13–39)
BASOPHILS # BLD AUTO: 0.03 THOUSANDS/ÂΜL (ref 0–0.1)
BASOPHILS NFR BLD AUTO: 0 % (ref 0–1)
BILIRUB SERPL-MCNC: 0.23 MG/DL (ref 0.2–1)
BUN SERPL-MCNC: 8 MG/DL (ref 5–25)
CALCIUM ALBUM COR SERPL-MCNC: 8.7 MG/DL (ref 8.3–10.1)
CALCIUM SERPL-MCNC: 8.2 MG/DL (ref 8.4–10.2)
CHLORIDE SERPL-SCNC: 106 MMOL/L (ref 96–108)
CO2 SERPL-SCNC: 24 MMOL/L (ref 21–32)
CREAT SERPL-MCNC: 0.51 MG/DL (ref 0.6–1.3)
EOSINOPHIL # BLD AUTO: 0.07 THOUSAND/ÂΜL (ref 0–0.61)
EOSINOPHIL NFR BLD AUTO: 1 % (ref 0–6)
ERYTHROCYTE [DISTWIDTH] IN BLOOD BY AUTOMATED COUNT: 12.9 % (ref 11.6–15.1)
GFR SERPL CREATININE-BSD FRML MDRD: 133 ML/MIN/1.73SQ M
GLUCOSE 1H P 50 G GLC PO SERPL-MCNC: 89 MG/DL (ref 40–134)
GLUCOSE SERPL-MCNC: 89 MG/DL (ref 65–140)
HCT VFR BLD AUTO: 29.3 % (ref 34.8–46.1)
HGB BLD-MCNC: 9.3 G/DL (ref 11.5–15.4)
IMM GRANULOCYTES # BLD AUTO: 0.03 THOUSAND/UL (ref 0–0.2)
IMM GRANULOCYTES NFR BLD AUTO: 0 % (ref 0–2)
LYMPHOCYTES # BLD AUTO: 1.4 THOUSANDS/ÂΜL (ref 0.6–4.47)
LYMPHOCYTES NFR BLD AUTO: 18 % (ref 14–44)
MCH RBC QN AUTO: 30 PG (ref 26.8–34.3)
MCHC RBC AUTO-ENTMCNC: 31.7 G/DL (ref 31.4–37.4)
MCV RBC AUTO: 95 FL (ref 82–98)
MONOCYTES # BLD AUTO: 0.5 THOUSAND/ÂΜL (ref 0.17–1.22)
MONOCYTES NFR BLD AUTO: 6 % (ref 4–12)
NEUTROPHILS # BLD AUTO: 5.83 THOUSANDS/ÂΜL (ref 1.85–7.62)
NEUTS SEG NFR BLD AUTO: 75 % (ref 43–75)
NRBC BLD AUTO-RTO: 0 /100 WBCS
PLATELET # BLD AUTO: 211 THOUSANDS/UL (ref 149–390)
PMV BLD AUTO: 10.8 FL (ref 8.9–12.7)
POTASSIUM SERPL-SCNC: 3.4 MMOL/L (ref 3.5–5.3)
PROT SERPL-MCNC: 6.7 G/DL (ref 6.4–8.4)
RBC # BLD AUTO: 3.1 MILLION/UL (ref 3.81–5.12)
RPR SER QL: NORMAL
SODIUM SERPL-SCNC: 136 MMOL/L (ref 135–147)
WBC # BLD AUTO: 7.86 THOUSAND/UL (ref 4.31–10.16)

## 2023-02-15 ENCOUNTER — ROUTINE PRENATAL (OUTPATIENT)
Dept: OBGYN CLINIC | Facility: CLINIC | Age: 28
End: 2023-02-15

## 2023-02-15 VITALS
WEIGHT: 179.4 LBS | SYSTOLIC BLOOD PRESSURE: 108 MMHG | BODY MASS INDEX: 29.89 KG/M2 | DIASTOLIC BLOOD PRESSURE: 62 MMHG | HEIGHT: 65 IN

## 2023-02-15 DIAGNOSIS — Z34.92 SECOND TRIMESTER PREGNANCY: Primary | ICD-10-CM

## 2023-02-15 DIAGNOSIS — D50.9 IRON DEFICIENCY ANEMIA, UNSPECIFIED IRON DEFICIENCY ANEMIA TYPE: ICD-10-CM

## 2023-02-15 RX ORDER — FERROUS SULFATE 325(65) MG
325 TABLET ORAL
COMMUNITY

## 2023-02-15 NOTE — PROGRESS NOTES
Good FM  Pt feels well  Does note some occasional cramping at night  We reviewed s/x PTL to call with  Denies pain, pelvic pressure, vaginal d/c or changes as well  We reviewed labs  Will start iron daily PO and recheck labs prior to appt in 4 weeks  30 week packet given to pt  She does not plan to breast feed, no pump ordered     Urine neg/neg   Tdap given

## 2023-02-16 LAB — BILE AC SERPL-SCNC: 2.1 UMOL/L (ref 0–10)

## 2023-02-24 ENCOUNTER — TELEPHONE (OUTPATIENT)
Dept: OBGYN CLINIC | Facility: CLINIC | Age: 28
End: 2023-02-24

## 2023-02-24 NOTE — TELEPHONE ENCOUNTER
Pt is calling because she is 28w pregnant and woke up swollen. Her hands, legs, ankles. She also has a headache but has a history of migraines. Pt. Is going to check her blood pressure at the pharmacy and give us a call back with the numbers.

## 2023-03-07 ENCOUNTER — APPOINTMENT (OUTPATIENT)
Dept: LAB | Facility: CLINIC | Age: 28
End: 2023-03-07

## 2023-03-07 DIAGNOSIS — D50.9 IRON DEFICIENCY ANEMIA, UNSPECIFIED IRON DEFICIENCY ANEMIA TYPE: ICD-10-CM

## 2023-03-07 DIAGNOSIS — Z34.92 SECOND TRIMESTER PREGNANCY: ICD-10-CM

## 2023-03-07 LAB
BASOPHILS # BLD AUTO: 0.02 THOUSANDS/ÂΜL (ref 0–0.1)
BASOPHILS NFR BLD AUTO: 0 % (ref 0–1)
EOSINOPHIL # BLD AUTO: 0.08 THOUSAND/ÂΜL (ref 0–0.61)
EOSINOPHIL NFR BLD AUTO: 1 % (ref 0–6)
ERYTHROCYTE [DISTWIDTH] IN BLOOD BY AUTOMATED COUNT: 13.3 % (ref 11.6–15.1)
FERRITIN SERPL-MCNC: 6 NG/ML (ref 8–388)
HCT VFR BLD AUTO: 30.1 % (ref 34.8–46.1)
HGB BLD-MCNC: 9.6 G/DL (ref 11.5–15.4)
IMM GRANULOCYTES # BLD AUTO: 0.05 THOUSAND/UL (ref 0–0.2)
IMM GRANULOCYTES NFR BLD AUTO: 1 % (ref 0–2)
IRON SATN MFR SERPL: 8 % (ref 15–50)
IRON SERPL-MCNC: 37 UG/DL (ref 50–170)
LYMPHOCYTES # BLD AUTO: 1.81 THOUSANDS/ÂΜL (ref 0.6–4.47)
LYMPHOCYTES NFR BLD AUTO: 20 % (ref 14–44)
MCH RBC QN AUTO: 29.7 PG (ref 26.8–34.3)
MCHC RBC AUTO-ENTMCNC: 31.9 G/DL (ref 31.4–37.4)
MCV RBC AUTO: 93 FL (ref 82–98)
MONOCYTES # BLD AUTO: 0.63 THOUSAND/ÂΜL (ref 0.17–1.22)
MONOCYTES NFR BLD AUTO: 7 % (ref 4–12)
NEUTROPHILS # BLD AUTO: 6.59 THOUSANDS/ÂΜL (ref 1.85–7.62)
NEUTS SEG NFR BLD AUTO: 71 % (ref 43–75)
NRBC BLD AUTO-RTO: 0 /100 WBCS
PLATELET # BLD AUTO: 229 THOUSANDS/UL (ref 149–390)
PMV BLD AUTO: 11.6 FL (ref 8.9–12.7)
RBC # BLD AUTO: 3.23 MILLION/UL (ref 3.81–5.12)
TIBC SERPL-MCNC: 472 UG/DL (ref 250–450)
WBC # BLD AUTO: 9.18 THOUSAND/UL (ref 4.31–10.16)

## 2023-03-07 RX ORDER — SODIUM CHLORIDE 9 MG/ML
20 INJECTION, SOLUTION INTRAVENOUS ONCE
Status: CANCELLED | OUTPATIENT
Start: 2023-03-14

## 2023-03-08 ENCOUNTER — ROUTINE PRENATAL (OUTPATIENT)
Dept: OBGYN CLINIC | Facility: CLINIC | Age: 28
End: 2023-03-08

## 2023-03-08 VITALS
WEIGHT: 187.8 LBS | BODY MASS INDEX: 31.29 KG/M2 | SYSTOLIC BLOOD PRESSURE: 110 MMHG | DIASTOLIC BLOOD PRESSURE: 66 MMHG | HEIGHT: 65 IN

## 2023-03-08 DIAGNOSIS — Z34.93 PRENATAL CARE, THIRD TRIMESTER: ICD-10-CM

## 2023-03-08 DIAGNOSIS — Z3A.30 30 WEEKS GESTATION OF PREGNANCY: Primary | ICD-10-CM

## 2023-03-08 NOTE — PROGRESS NOTES
Nayeli Castro" is a 33 y o   30w4d  Reports ++FM, no LOF, VB, or regular contractions--only at night       Vitals:    23 0700   BP: 110/66   S=D  +FHTs  SVE closed/3/post     A/P:  Third tri labs notable for anemia--hgb 9 6, iron def (normal hemoglobin fractionation and previously normal hgb) working on IV iron  TDAP vaccine--completed 2/15/23  Has 30 week folder, will bring next time    Contraception: vasectomy  Breastfeeding: formula (no pump)    Discussed pre-term labor precautions  Return to office in 2 weeks

## 2023-03-10 ENCOUNTER — TELEPHONE (OUTPATIENT)
Dept: INFUSION CENTER | Facility: HOSPITAL | Age: 28
End: 2023-03-10

## 2023-03-10 DIAGNOSIS — D50.9 IRON DEFICIENCY ANEMIA, UNSPECIFIED IRON DEFICIENCY ANEMIA TYPE: Primary | ICD-10-CM

## 2023-03-10 DIAGNOSIS — K21.9 MILD ACID REFLUX: ICD-10-CM

## 2023-03-10 RX ORDER — SODIUM CHLORIDE 9 MG/ML
20 INJECTION, SOLUTION INTRAVENOUS ONCE
Status: CANCELLED | OUTPATIENT
Start: 2023-03-13

## 2023-03-10 NOTE — TELEPHONE ENCOUNTER
Called patient and left message in regards to scheduling iron infusions that her doctor ordered  Left a callback number and asked patient to return phone call in order to schedule

## 2023-03-13 RX ORDER — PANTOPRAZOLE SODIUM 20 MG/1
TABLET, DELAYED RELEASE ORAL
Qty: 90 TABLET | Refills: 1 | Status: SHIPPED | OUTPATIENT
Start: 2023-03-13

## 2023-03-20 DIAGNOSIS — D50.9 IRON DEFICIENCY ANEMIA, UNSPECIFIED IRON DEFICIENCY ANEMIA TYPE: Primary | ICD-10-CM

## 2023-03-20 RX ORDER — SODIUM CHLORIDE 9 MG/ML
20 INJECTION, SOLUTION INTRAVENOUS ONCE
Status: CANCELLED | OUTPATIENT
Start: 2023-03-23

## 2023-03-22 ENCOUNTER — ROUTINE PRENATAL (OUTPATIENT)
Dept: OBGYN CLINIC | Facility: CLINIC | Age: 28
End: 2023-03-22

## 2023-03-22 VITALS — DIASTOLIC BLOOD PRESSURE: 68 MMHG | SYSTOLIC BLOOD PRESSURE: 112 MMHG | WEIGHT: 191 LBS | BODY MASS INDEX: 31.78 KG/M2

## 2023-03-22 DIAGNOSIS — Z34.93 PRENATAL CARE IN THIRD TRIMESTER: Primary | ICD-10-CM

## 2023-03-22 NOTE — PROGRESS NOTES
Patient reports good fm, no n/v, , cramping, bleeding, loss of fluid,, dom violence, or smoking  rhianna pnv urine neg/neg has iron infusions starting  Return in 2 weeks or sooner as needed

## 2023-03-23 ENCOUNTER — HOSPITAL ENCOUNTER (OUTPATIENT)
Dept: INFUSION CENTER | Facility: HOSPITAL | Age: 28
Discharge: HOME | End: 2023-03-23
Attending: OBSTETRICS & GYNECOLOGY

## 2023-03-23 VITALS
SYSTOLIC BLOOD PRESSURE: 122 MMHG | RESPIRATION RATE: 18 BRPM | DIASTOLIC BLOOD PRESSURE: 79 MMHG | TEMPERATURE: 98.1 F | HEART RATE: 98 BPM

## 2023-03-23 DIAGNOSIS — D50.9 IRON DEFICIENCY ANEMIA, UNSPECIFIED IRON DEFICIENCY ANEMIA TYPE: Primary | ICD-10-CM

## 2023-03-23 RX ORDER — SODIUM CHLORIDE 9 MG/ML
20 INJECTION, SOLUTION INTRAVENOUS ONCE
Status: CANCELLED | OUTPATIENT
Start: 2023-03-25

## 2023-03-23 RX ORDER — SODIUM CHLORIDE 9 MG/ML
20 INJECTION, SOLUTION INTRAVENOUS ONCE
Status: COMPLETED | OUTPATIENT
Start: 2023-03-23 | End: 2023-03-23

## 2023-03-23 RX ADMIN — SODIUM CHLORIDE 20 ML/HR: 0.9 INJECTION, SOLUTION INTRAVENOUS at 12:25

## 2023-03-23 RX ADMIN — IRON SUCROSE 200 MG: 20 INJECTION, SOLUTION INTRAVENOUS at 12:26

## 2023-03-23 NOTE — PROGRESS NOTES
Pt tolerated Venofer infusion without incident  Next appt confirmed, AVS declined  (2) more than 100 beats/min

## 2023-03-30 ENCOUNTER — HOSPITAL ENCOUNTER (OUTPATIENT)
Dept: INFUSION CENTER | Facility: HOSPITAL | Age: 28
Discharge: HOME | End: 2023-03-30
Attending: OBSTETRICS & GYNECOLOGY

## 2023-03-30 VITALS
RESPIRATION RATE: 18 BRPM | HEART RATE: 82 BPM | DIASTOLIC BLOOD PRESSURE: 75 MMHG | SYSTOLIC BLOOD PRESSURE: 116 MMHG | TEMPERATURE: 98 F

## 2023-03-30 DIAGNOSIS — D50.9 IRON DEFICIENCY ANEMIA, UNSPECIFIED IRON DEFICIENCY ANEMIA TYPE: Primary | ICD-10-CM

## 2023-03-30 RX ORDER — SODIUM CHLORIDE 9 MG/ML
20 INJECTION, SOLUTION INTRAVENOUS ONCE
Status: COMPLETED | OUTPATIENT
Start: 2023-03-30 | End: 2023-03-30

## 2023-03-30 RX ORDER — SODIUM CHLORIDE 9 MG/ML
20 INJECTION, SOLUTION INTRAVENOUS ONCE
Status: CANCELLED | OUTPATIENT
Start: 2023-04-01

## 2023-03-30 RX ADMIN — IRON SUCROSE 200 MG: 20 INJECTION, SOLUTION INTRAVENOUS at 10:41

## 2023-03-30 RX ADMIN — SODIUM CHLORIDE 20 ML/HR: 0.9 INJECTION, SOLUTION INTRAVENOUS at 10:41

## 2023-04-04 ENCOUNTER — HOSPITAL ENCOUNTER (OUTPATIENT)
Dept: INFUSION CENTER | Facility: HOSPITAL | Age: 28
Discharge: HOME | End: 2023-04-04
Attending: OBSTETRICS & GYNECOLOGY

## 2023-04-04 VITALS
TEMPERATURE: 97.6 F | DIASTOLIC BLOOD PRESSURE: 66 MMHG | RESPIRATION RATE: 18 BRPM | SYSTOLIC BLOOD PRESSURE: 101 MMHG | HEART RATE: 80 BPM

## 2023-04-04 DIAGNOSIS — D50.9 IRON DEFICIENCY ANEMIA, UNSPECIFIED IRON DEFICIENCY ANEMIA TYPE: Primary | ICD-10-CM

## 2023-04-04 RX ORDER — SODIUM CHLORIDE 9 MG/ML
20 INJECTION, SOLUTION INTRAVENOUS ONCE
Status: CANCELLED | OUTPATIENT
Start: 2023-04-06

## 2023-04-04 RX ORDER — SODIUM CHLORIDE 9 MG/ML
20 INJECTION, SOLUTION INTRAVENOUS ONCE
Status: COMPLETED | OUTPATIENT
Start: 2023-04-04 | End: 2023-04-04

## 2023-04-04 RX ADMIN — SODIUM CHLORIDE 20 ML/HR: 0.9 INJECTION, SOLUTION INTRAVENOUS at 07:46

## 2023-04-04 RX ADMIN — IRON SUCROSE 200 MG: 20 INJECTION, SOLUTION INTRAVENOUS at 07:46

## 2023-04-05 ENCOUNTER — ROUTINE PRENATAL (OUTPATIENT)
Dept: OBGYN CLINIC | Facility: CLINIC | Age: 28
End: 2023-04-05

## 2023-04-05 VITALS
HEIGHT: 65 IN | DIASTOLIC BLOOD PRESSURE: 64 MMHG | SYSTOLIC BLOOD PRESSURE: 100 MMHG | WEIGHT: 193.8 LBS | BODY MASS INDEX: 32.29 KG/M2

## 2023-04-05 DIAGNOSIS — Z34.93 PRENATAL CARE, THIRD TRIMESTER: ICD-10-CM

## 2023-04-05 DIAGNOSIS — Z3A.34 34 WEEKS GESTATION OF PREGNANCY: Primary | ICD-10-CM

## 2023-04-05 NOTE — PROGRESS NOTES
Dany Long is a 33 y o   34w4d  Reports ++FM, no LOF, VB, or contractions     Itchiness improved    Vitals:    23 0800   BP: 100/64   S=D  +FHTs    A/P:  Third tri labs notable for anemia, on iv iron  Rh status POS  Delivery consent signed today    TDAP vaccine--02/15/23  Contraception: considering  Breastfeeding: no    Discussed pre-term labor precautions  Return to office in 2 weeks

## 2023-04-21 PROCEDURE — 87150 DNA/RNA AMPLIFIED PROBE: CPT

## 2023-04-27 ENCOUNTER — ROUTINE PRENATAL (OUTPATIENT)
Dept: OBGYN CLINIC | Facility: CLINIC | Age: 28
End: 2023-04-27

## 2023-04-27 ENCOUNTER — APPOINTMENT (OUTPATIENT)
Dept: LAB | Facility: CLINIC | Age: 28
End: 2023-04-27

## 2023-04-27 ENCOUNTER — TELEPHONE (OUTPATIENT)
Dept: OBGYN CLINIC | Facility: CLINIC | Age: 28
End: 2023-04-27

## 2023-04-27 VITALS
HEIGHT: 65 IN | SYSTOLIC BLOOD PRESSURE: 120 MMHG | DIASTOLIC BLOOD PRESSURE: 78 MMHG | WEIGHT: 201 LBS | BODY MASS INDEX: 33.49 KG/M2

## 2023-04-27 DIAGNOSIS — D50.9 IRON DEFICIENCY ANEMIA, UNSPECIFIED IRON DEFICIENCY ANEMIA TYPE: ICD-10-CM

## 2023-04-27 DIAGNOSIS — Z34.90 ENCOUNTER FOR SUPERVISION OF NORMAL PREGNANCY, ANTEPARTUM, UNSPECIFIED GRAVIDITY: Primary | ICD-10-CM

## 2023-04-27 DIAGNOSIS — Z34.93 PRENATAL CARE, THIRD TRIMESTER: ICD-10-CM

## 2023-04-27 LAB
BASOPHILS # BLD AUTO: 0.02 THOUSANDS/ΜL (ref 0–0.1)
BASOPHILS NFR BLD AUTO: 0 % (ref 0–1)
EOSINOPHIL # BLD AUTO: 0.05 THOUSAND/ΜL (ref 0–0.61)
EOSINOPHIL NFR BLD AUTO: 1 % (ref 0–6)
ERYTHROCYTE [DISTWIDTH] IN BLOOD BY AUTOMATED COUNT: 19.9 % (ref 11.6–15.1)
FERRITIN SERPL-MCNC: 79 NG/ML (ref 8–388)
HCT VFR BLD AUTO: 36.2 % (ref 34.8–46.1)
HGB BLD-MCNC: 11.3 G/DL (ref 11.5–15.4)
IMM GRANULOCYTES # BLD AUTO: 0.05 THOUSAND/UL (ref 0–0.2)
IMM GRANULOCYTES NFR BLD AUTO: 1 % (ref 0–2)
IRON SATN MFR SERPL: 21 % (ref 15–50)
IRON SERPL-MCNC: 78 UG/DL (ref 50–170)
LYMPHOCYTES # BLD AUTO: 1.84 THOUSANDS/ΜL (ref 0.6–4.47)
LYMPHOCYTES NFR BLD AUTO: 21 % (ref 14–44)
MCH RBC QN AUTO: 29.6 PG (ref 26.8–34.3)
MCHC RBC AUTO-ENTMCNC: 31.2 G/DL (ref 31.4–37.4)
MCV RBC AUTO: 95 FL (ref 82–98)
MONOCYTES # BLD AUTO: 0.63 THOUSAND/ΜL (ref 0.17–1.22)
MONOCYTES NFR BLD AUTO: 7 % (ref 4–12)
NEUTROPHILS # BLD AUTO: 6.4 THOUSANDS/ΜL (ref 1.85–7.62)
NEUTS SEG NFR BLD AUTO: 70 % (ref 43–75)
NRBC BLD AUTO-RTO: 0 /100 WBCS
PLATELET # BLD AUTO: 194 THOUSANDS/UL (ref 149–390)
PMV BLD AUTO: 11.4 FL (ref 8.9–12.7)
RBC # BLD AUTO: 3.82 MILLION/UL (ref 3.81–5.12)
TIBC SERPL-MCNC: 364 UG/DL (ref 250–450)
WBC # BLD AUTO: 8.99 THOUSAND/UL (ref 4.31–10.16)

## 2023-04-27 NOTE — TELEPHONE ENCOUNTER
Received request from provider: 2nd baby - KHOI 5/13/23 - will need to be an overnight elective IOL at 39 weeks - desires the earlier the better

## 2023-04-27 NOTE — PROGRESS NOTES
VISIT: (+) cramping - irregular in third trimester - very mild; (+) edema - legs/feet/hands; Denies n/v/HA//vb/lof/dv/smoking; urine trace/neg - encourage hydration  GBS positive - reviewed; follow-up CBC and iron panel done this AM - results pending; just finished IV iron infusions and currently not taking oral iron  PNVs + DHA - tolerating daily  Good FM - r/angélica 10 kicks/2 hrs   Declines cervical check today    Desires elective IOL at 39 weeks - reviewed process - patient understands can get bumped - message sent to triage to schedule at 38 weeks;   Reviewed signs and symptoms of labor and when to call; Reviewed signs and symptoms of pregnancy induced hypertension or preeclampsia and when to call  RTO in 1 weeks for routine ob check or sooner if needed

## 2023-05-02 NOTE — TELEPHONE ENCOUNTER
Patient scheduled for Monday 5/8 at 8pm for an IOL with ripening  Pink sticky updated calender updated, patient and provider aware

## 2023-05-03 ENCOUNTER — ROUTINE PRENATAL (OUTPATIENT)
Dept: OBGYN CLINIC | Facility: CLINIC | Age: 28
End: 2023-05-03

## 2023-05-03 VITALS — WEIGHT: 201 LBS | DIASTOLIC BLOOD PRESSURE: 72 MMHG | BODY MASS INDEX: 33.45 KG/M2 | SYSTOLIC BLOOD PRESSURE: 118 MMHG

## 2023-05-03 DIAGNOSIS — Z34.83 MULTIGRAVIDA IN THIRD TRIMESTER: Primary | ICD-10-CM

## 2023-05-03 NOTE — PROGRESS NOTES
Pt had been having some cramping since approx 30 weeks, have increased more recently, Nothing consistent or painful  No vb/lof  Obed Rosen has picked up a bit, no vomiting  Shre has had some increased headaches-they do resolve w Tylenol  She does have a h/o migraines  Pt denies any excessive edema, no RUQ pain, no visual changes  Has IOL planned for Monday at 39w1d  Cx posterior cl/30/-3  All questions answered

## 2023-05-09 ENCOUNTER — HOSPITAL ENCOUNTER (OUTPATIENT)
Dept: LABOR AND DELIVERY | Facility: HOSPITAL | Age: 28
Discharge: HOME | End: 2023-05-09

## 2023-05-09 ENCOUNTER — ANESTHESIA (INPATIENT)
Dept: ANESTHESIOLOGY | Facility: HOSPITAL | Age: 28
End: 2023-05-09

## 2023-05-09 ENCOUNTER — HOSPITAL ENCOUNTER (INPATIENT)
Facility: HOSPITAL | Age: 28
LOS: 2 days | Discharge: HOME | End: 2023-05-11
Attending: STUDENT IN AN ORGANIZED HEALTH CARE EDUCATION/TRAINING PROGRAM | Admitting: STUDENT IN AN ORGANIZED HEALTH CARE EDUCATION/TRAINING PROGRAM

## 2023-05-09 ENCOUNTER — ANESTHESIA EVENT (INPATIENT)
Dept: ANESTHESIOLOGY | Facility: HOSPITAL | Age: 28
End: 2023-05-09

## 2023-05-09 DIAGNOSIS — Z3A.39 39 WEEKS GESTATION OF PREGNANCY: Primary | ICD-10-CM

## 2023-05-09 PROBLEM — Z34.90 ENCOUNTER FOR INDUCTION OF LABOR: Status: ACTIVE | Noted: 2023-05-09

## 2023-05-09 LAB
ABO GROUP BLD: NORMAL
AMPHETAMINES SERPL QL SCN: NEGATIVE
BARBITURATES UR QL: NEGATIVE
BENZODIAZ UR QL: NEGATIVE
BLD GP AB SCN SERPL QL: NEGATIVE
COCAINE UR QL: NEGATIVE
ERYTHROCYTE [DISTWIDTH] IN BLOOD BY AUTOMATED COUNT: 18.6 % (ref 11.6–15.1)
HCT VFR BLD AUTO: 34.5 % (ref 34.8–46.1)
HGB BLD-MCNC: 11 G/DL (ref 11.5–15.4)
HOLD SPECIMEN: NORMAL
MCH RBC QN AUTO: 29.3 PG (ref 26.8–34.3)
MCHC RBC AUTO-ENTMCNC: 31.9 G/DL (ref 31.4–37.4)
MCV RBC AUTO: 92 FL (ref 82–98)
METHADONE UR QL: NEGATIVE
OPIATES UR QL SCN: NEGATIVE
OXYCODONE+OXYMORPHONE UR QL SCN: NEGATIVE
PCP UR QL: NEGATIVE
PLATELET # BLD AUTO: 211 THOUSANDS/UL (ref 149–390)
PMV BLD AUTO: 11.3 FL (ref 8.9–12.7)
RBC # BLD AUTO: 3.75 MILLION/UL (ref 3.81–5.12)
RH BLD: POSITIVE
SPECIMEN EXPIRATION DATE: NORMAL
THC UR QL: NEGATIVE
TREPONEMA PALLIDUM IGG+IGM AB [PRESENCE] IN SERUM OR PLASMA BY IMMUNOASSAY: NORMAL
WBC # BLD AUTO: 9.29 THOUSAND/UL (ref 4.31–10.16)

## 2023-05-09 PROCEDURE — 10907ZC DRAINAGE OF AMNIOTIC FLUID, THERAPEUTIC FROM PRODUCTS OF CONCEPTION, VIA NATURAL OR ARTIFICIAL OPENING: ICD-10-PCS | Performed by: STUDENT IN AN ORGANIZED HEALTH CARE EDUCATION/TRAINING PROGRAM

## 2023-05-09 PROCEDURE — 3E033VJ INTRODUCTION OF OTHER HORMONE INTO PERIPHERAL VEIN, PERCUTANEOUS APPROACH: ICD-10-PCS | Performed by: STUDENT IN AN ORGANIZED HEALTH CARE EDUCATION/TRAINING PROGRAM

## 2023-05-09 PROCEDURE — 4A1HXCZ MONITORING OF PRODUCTS OF CONCEPTION, CARDIAC RATE, EXTERNAL APPROACH: ICD-10-PCS | Performed by: STUDENT IN AN ORGANIZED HEALTH CARE EDUCATION/TRAINING PROGRAM

## 2023-05-09 RX ORDER — OXYTOCIN/RINGER'S LACTATE 30/500 ML
PLASTIC BAG, INJECTION (ML) INTRAVENOUS
Status: COMPLETED
Start: 2023-05-09 | End: 2023-05-10

## 2023-05-09 RX ORDER — LIDOCAINE HYDROCHLORIDE AND EPINEPHRINE 15; 5 MG/ML; UG/ML
INJECTION, SOLUTION EPIDURAL AS NEEDED
Status: DISCONTINUED | OUTPATIENT
Start: 2023-05-09 | End: 2023-05-10 | Stop reason: HOSPADM

## 2023-05-09 RX ORDER — ROPIVACAINE HYDROCHLORIDE 2 MG/ML
INJECTION, SOLUTION EPIDURAL; INFILTRATION; PERINEURAL CONTINUOUS PRN
Status: DISCONTINUED | OUTPATIENT
Start: 2023-05-09 | End: 2023-05-10 | Stop reason: HOSPADM

## 2023-05-09 RX ORDER — SODIUM CHLORIDE, SODIUM LACTATE, POTASSIUM CHLORIDE, CALCIUM CHLORIDE 600; 310; 30; 20 MG/100ML; MG/100ML; MG/100ML; MG/100ML
125 INJECTION, SOLUTION INTRAVENOUS CONTINUOUS
Status: DISCONTINUED | OUTPATIENT
Start: 2023-05-09 | End: 2023-05-10

## 2023-05-09 RX ORDER — NALBUPHINE HCL 10 MG/ML
5 AMPUL (ML) INJECTION ONCE
Status: COMPLETED | OUTPATIENT
Start: 2023-05-09 | End: 2023-05-09

## 2023-05-09 RX ORDER — ONDANSETRON 2 MG/ML
4 INJECTION INTRAMUSCULAR; INTRAVENOUS EVERY 4 HOURS PRN
Status: DISCONTINUED | OUTPATIENT
Start: 2023-05-09 | End: 2023-05-10

## 2023-05-09 RX ORDER — ACETAMINOPHEN 325 MG/1
975 TABLET ORAL EVERY 6 HOURS PRN
Status: DISCONTINUED | OUTPATIENT
Start: 2023-05-09 | End: 2023-05-10

## 2023-05-09 RX ORDER — BUPIVACAINE HYDROCHLORIDE 2.5 MG/ML
30 INJECTION, SOLUTION EPIDURAL; INFILTRATION; INTRACAUDAL ONCE AS NEEDED
Status: DISCONTINUED | OUTPATIENT
Start: 2023-05-09 | End: 2023-05-10

## 2023-05-09 RX ADMIN — NALBUPHINE HYDROCHLORIDE 5 MG: 10 INJECTION, SOLUTION INTRAMUSCULAR; INTRAVENOUS; SUBCUTANEOUS at 19:22

## 2023-05-09 RX ADMIN — PENICILLIN G POTASSIUM 2.5 MILLION UNITS: 20000000 INJECTION, POWDER, FOR SOLUTION INTRAVENOUS at 18:25

## 2023-05-09 RX ADMIN — ROPIVACAINE HYDROCHLORIDE 10 ML/HR: 2 INJECTION, SOLUTION EPIDURAL; INFILTRATION at 22:15

## 2023-05-09 RX ADMIN — PENICILLIN G POTASSIUM 5 MILLION UNITS: 20000000 INJECTION, POWDER, FOR SOLUTION INTRAVENOUS at 14:29

## 2023-05-09 RX ADMIN — PENICILLIN G POTASSIUM 2.5 MILLION UNITS: 20000000 INJECTION, POWDER, FOR SOLUTION INTRAVENOUS at 22:55

## 2023-05-09 RX ADMIN — ACETAMINOPHEN 975 MG: 325 TABLET ORAL at 22:55

## 2023-05-09 RX ADMIN — LIDOCAINE HYDROCHLORIDE AND EPINEPHRINE 5 ML: 15; 5 INJECTION, SOLUTION EPIDURAL at 22:15

## 2023-05-09 RX ADMIN — SODIUM CHLORIDE, SODIUM LACTATE, POTASSIUM CHLORIDE, AND CALCIUM CHLORIDE 999 ML/HR: .6; .31; .03; .02 INJECTION, SOLUTION INTRAVENOUS at 23:27

## 2023-05-09 RX ADMIN — ACETAMINOPHEN 975 MG: 325 TABLET ORAL at 14:53

## 2023-05-09 RX ADMIN — Medication: at 22:20

## 2023-05-09 RX ADMIN — SODIUM CHLORIDE, SODIUM LACTATE, POTASSIUM CHLORIDE, AND CALCIUM CHLORIDE 125 ML/HR: .6; .31; .03; .02 INJECTION, SOLUTION INTRAVENOUS at 13:40

## 2023-05-09 NOTE — PLAN OF CARE
Problem: BIRTH - VAGINAL/ SECTION  Goal: Fetal and maternal status remain reassuring during the birth process  Description: INTERVENTIONS:  - Monitor vital signs  - Monitor fetal heart rate  - Monitor uterine activity  - Monitor labor progression (vaginal delivery)  - DVT prophylaxis  - Antibiotic prophylaxis  Outcome: Progressing  Goal: Emotionally satisfying birthing experience for mother/fetus  Description: Interventions:  - Assess, plan, implement and evaluate the nursing care given to the patient in labor  - Advocate the philosophy that each childbirth experience is a unique experience and support the family's chosen level of involvement and control during the labor process   - Actively participate in both the patient's and family's teaching of the birth process  - Consider cultural, Spiritism and age-specific factors and plan care for the patient in labor  Outcome: Progressing     Problem: PAIN - ADULT  Goal: Verbalizes/displays adequate comfort level or baseline comfort level  Description: Interventions:  - Encourage patient to monitor pain and request assistance  - Assess pain using appropriate pain scale  - Administer analgesics based on type and severity of pain and evaluate response  - Implement non-pharmacological measures as appropriate and evaluate response  - Consider cultural and social influences on pain and pain management  - Notify physician/advanced practitioner if interventions unsuccessful or patient reports new pain  Outcome: Progressing     Problem: INFECTION - ADULT  Goal: Absence or prevention of progression during hospitalization  Description: INTERVENTIONS:  - Assess and monitor for signs and symptoms of infection  - Monitor lab/diagnostic results  - Monitor all insertion sites, i e  indwelling lines, tubes, and drains  - Monitor endotracheal if appropriate and nasal secretions for changes in amount and color  - McCormick appropriate cooling/warming therapies per order  - Administer medications as ordered  - Instruct and encourage patient and family to use good hand hygiene technique  - Identify and instruct in appropriate isolation precautions for identified infection/condition  Outcome: Progressing  Goal: Absence of fever/infection during neutropenic period  Description: INTERVENTIONS:  - Monitor WBC    Outcome: Progressing     Problem: SAFETY ADULT  Goal: Patient will remain free of falls  Description: INTERVENTIONS:  - Educate patient/family on patient safety including physical limitations  - Instruct patient to call for assistance with activity   - Consult OT/PT to assist with strengthening/mobility   - Keep Call bell within reach  - Keep bed low and locked with side rails adjusted as appropriate  - Keep care items and personal belongings within reach  - Initiate and maintain comfort rounds  - Make Fall Risk Sign visible to staff  - Offer Toileting every 2 Hours, in advance of need  Goal: Maintain or return to baseline ADL function  Description: INTERVENTIONS:  -  Assess patient's ability to carry out ADLs; assess patient's baseline for ADL function and identify physical deficits which impact ability to perform ADLs (bathing, care of mouth/teeth, toileting, grooming, dressing, etc )  - Assess/evaluate cause of self-care deficits   - Assess range of motion  - Assess patient's mobility; develop plan if impaired  - Assess patient's need for assistive devices and provide as appropriate  - Encourage maximum independence but intervene and supervise when necessary  - Involve family in performance of ADLs  - Assess for home care needs following discharge   - Consider OT consult to assist with ADL evaluation and planning for discharge  - Provide patient education as appropriate  Outcome: Progressing  Goal: Maintains/Returns to pre admission functional level  Description: INTERVENTIONS:  - Perform BMAT or MOVE assessment daily    - Set and communicate daily mobility goal to care team and patient/family/caregiver  - Collaborate with rehabilitation services on mobility goals if consulted  - Reposition patient every 2 hours  - Out of bed for toileting  - Record patient progress and toleration of activity level   Outcome: Progressing     Problem: Knowledge Deficit  Goal: Patient/family/caregiver demonstrates understanding of disease process, treatment plan, medications, and discharge instructions  Description: Complete learning assessment and assess knowledge base    Interventions:  - Provide teaching at level of understanding  - Provide teaching via preferred learning methods  Outcome: Progressing     Problem: DISCHARGE PLANNING  Goal: Discharge to home or other facility with appropriate resources  Description: INTERVENTIONS:  - Identify barriers to discharge w/patient and caregiver  - Arrange for needed discharge resources and transportation as appropriate  - Identify discharge learning needs (meds, wound care, etc )  - Arrange for interpretive services to assist at discharge as needed  - Refer to Case Management Department for coordinating discharge planning if the patient needs post-hospital services based on physician/advanced practitioner order or complex needs related to functional status, cognitive ability, or social support system  Outcome: Progressing

## 2023-05-09 NOTE — ASSESSMENT & PLAN NOTE
Admit   T&S, CBC, RPR  CLD  IV fluids  GBS prophylaxis is needed, PCN ordered  Induction with Pitocin titration

## 2023-05-09 NOTE — OB LABOR/OXYTOCIN SAFETY PROGRESS
Oxytocin Safety Progress Check Note - Sheyla Jon 33 y o  female MRN: [id number]    Unit/Bed#: -01 Encounter: [id number]                 Cervical Dilation: 1        Cervical Effacement: 27  Fetal Station: -3     Fetal Heart Rate: 137 BPM                  Vital Signs:   Vitals:    05/09/23 1342   BP: 111/70   Pulse: 73   Resp: 18   Temp: 98 6 °F (37 °C)       Notes/comments: SVE as above  FHT is CAT 1  Irregular Contractions noted on TOCO so cytotec was not placed     PROCEDURE:  SALGADO BALLOON PLACEMENT    A 24F salgado with a 30cc balloon was selected, a speculum examination was performed and the cervix was located  A salgado balloon was introduced over sterile gloved hands  Balloon advanced through cervix with a ringed forceps beyond the internal cervical os  A small amount amount of sterile saline solution was instilled in the balloon to confirm placement  Placement was confirmed to be beyond the internal cervical os  A total of 60cc of sterile saline solution was placed into the balloon  Pt tolerated well  Instructions left with RN to place salgado to gravity with a 1L bag of IV fluid  Notify DO/MD when salgado dislodged      MD Diana Osuna MD 5/9/2023 2:30 PM

## 2023-05-09 NOTE — H&P
Karley 49 A Dontrell 33 y o  female MRN: [id number]  Unit/Bed#: -01 Encounter: [id number]    Assessment: 33 y o   at 39w3d admitted for induction of  labor by maternal request  SVE: 3  FHT: reactive   Clinical EFW: 7lbs ; Vertex confirmed by us  GBS status: positive, PCN ordered   Induction : Barillas balloon followed by pitocin     Plan:   39 weeks gestation of pregnancy  Assessment & Plan        Iron deficiency anemia  Assessment & Plan  11 3, F/U admission cbc     Bipolar 2 disorder (HCC)  Assessment & Plan  Monitor symptoms    Symptoms of depression  Assessment & Plan  Monitor symptoms    Family history of genetic disease  Assessment & Plan  Overview:   Patient's father has Pompe disease  Patient declined carrier screening    Family history of congenital anomaly  Assessment & Plan  Cleft lip/cleft palate  Patient declined diagnostic testing    * Encounter for induction of labor  Assessment & Plan  Admit   T&S, CBC, RPR  CLD  IV fluids  GBS prophylaxis is needed, PCN ordered  Induction with Pitocin titration          Discussed case and plan w/ Dr Michaela Chavira      Chief Complaint: ready to meet baby     HPI: Richmond Álvarez is a 33 y o  Penny Beckett with an KHOI of 2023, by Ultrasound at 39w3d who is being admitted for induction of labor by maternal request   She reports cramping, has no LOF, and reports no VB  She states she has felt good FM      Patient Active Problem List   Diagnosis   • Family history of congenital anomaly   • Family history of genetic disease   • Irregular bleeding   • PMS (premenstrual syndrome)   • Dysmenorrhea   • Menorrhagia with regular cycle   • Symptoms of depression   • Neck discomfort   • Migraine without aura and without status migrainosus, not intractable   • ASCUS with positive high risk HPV cervical   • Other fatigue   • Bipolar 2 disorder (HCC)   • Iron deficiency anemia   • 39 weeks gestation of pregnancy   • Encounter for induction of labor Baby complications/comments: none    Review of Systems   Constitutional: Negative for chills and fever  Eyes: Positive for itching  Negative for visual disturbance  Respiratory: Negative for shortness of breath  Cardiovascular: Negative for chest pain  Gastrointestinal: Negative for diarrhea, nausea and vomiting  Genitourinary: Negative for dysuria, vaginal bleeding, vaginal discharge and vaginal pain  Neurological: Positive for headaches  OB Hx:  OB History    Para Term  AB Living   3 1 1   1 1   SAB IAB Ectopic Multiple Live Births   1     0 1      # Outcome Date GA Lbr Thai/2nd Weight Sex Delivery Anes PTL Lv   3 Current            2 Term 18 41w1d / 00:35 3487 g (7 lb 11 oz) M Vag-Spont EPI  NILESH   1 SAB 17               Past Medical Hx:  Past Medical History:   Diagnosis Date   • Anxiety     no meds during pregnancy   • Depression     no meds during pregnancy   • Migraines    • Miscarriage    • Varicella     IMMUNE       Past Surgical hx:  History reviewed  No pertinent surgical history  Social Hx:  Alcohol use: no  Tobacco use: no  Other substance use: marijuana    Other: none    Allergies   Allergen Reactions   • Shellfish-Derived Products - Food Allergy Anaphylaxis       Medications Prior to Admission   Medication   • pantoprazole (PROTONIX) 20 mg tablet   • Prenatal Multivit-Min-Fe-FA (PRE- PO)   • ferrous sulfate 325 (65 Fe) mg tablet       Objective:  Temp:  [98 6 °F (37 °C)-98 8 °F (37 1 °C)] 98 8 °F (37 1 °C)  HR:  [62-82] 75  Resp:  [16-18] 16  BP: (105-118)/(64-70) 117/70  Body mass index is 33 45 kg/m²  Physical Exam:  Physical Exam  Constitutional:       Appearance: Normal appearance  HENT:      Head: Normocephalic and atraumatic  Eyes:      Extraocular Movements: Extraocular movements intact  Cardiovascular:      Rate and Rhythm: Normal rate  Abdominal:      General: There is distension (gravid, nontender)        Palpations: Abdomen is soft  Musculoskeletal:      Cervical back: Normal range of motion  Neurological:      Mental Status: She is alert              FHT:  Baseline Rate: 135 bpm (125-135)  Variability: Moderate 6-25 bpm  Accelerations: 15 x 15 or greater, At variable times  Decelerations: None  FHR Category: Category I    TOCO:   Contraction Frequency (minutes): 2-4 5  Contraction Duration (seconds):   Contraction Quality: Moderate    Lab Results   Component Value Date    WBC 9 29 05/09/2023    HGB 11 0 (L) 05/09/2023    HCT 34 5 (L) 05/09/2023     05/09/2023     Lab Results   Component Value Date    K 3 4 (L) 02/14/2023     02/14/2023    CO2 24 02/14/2023    BUN 8 02/14/2023    CREATININE 0 51 (L) 02/14/2023    AST 12 (L) 02/14/2023    ALT 10 02/14/2023     Prenatal Labs: Reviewed      Blood type: A positive   Antibody: negative   GBS: positive  HIV: nonreactive   Rubella: Immune  VDRL/RPR: Non reactive  HBsAg: Negative  Chlamydia: Negative  Gonorrhea: Negative  Diabetes 1 hour screen: 89  3 hour glucose: na  Platelets: 317  Hgb: 9 6->11  >2 Midnights  INPATIENT     Signature/Title: Diana Andrade MD  Date: 5/9/2023  Time: 6:16 PM

## 2023-05-10 LAB
BASE EXCESS BLDCOA CALC-SCNC: -8.5 MMOL/L (ref 3–11)
BASE EXCESS BLDCOV CALC-SCNC: -2.7 MMOL/L (ref 1–9)
HCO3 BLDCOA-SCNC: 19.2 MMOL/L (ref 17.3–27.3)
HCO3 BLDCOV-SCNC: 22.6 MMOL/L (ref 12.2–28.6)
O2 CT VFR BLDCOA CALC: 17.2 ML/DL
OXYHGB MFR BLDCOA: 78.1 %
OXYHGB MFR BLDCOV: 77.3 %
PCO2 BLDCOA: 47.5 MM[HG] (ref 30–60)
PCO2 BLDCOV: 41.2 MM HG (ref 27–43)
PH BLDCOA: 7.22 [PH] (ref 7.23–7.43)
PH BLDCOV: 7.36 [PH] (ref 7.19–7.49)
PO2 BLDCOA: 41 MM HG (ref 5–25)
PO2 BLDCOV: 33 MM HG (ref 15–45)
SAO2 % BLDCOV: 16.7 ML/DL

## 2023-05-10 RX ORDER — IBUPROFEN 600 MG/1
600 TABLET ORAL EVERY 6 HOURS
Status: DISCONTINUED | OUTPATIENT
Start: 2023-05-10 | End: 2023-05-11 | Stop reason: HOSPADM

## 2023-05-10 RX ORDER — ONDANSETRON 2 MG/ML
4 INJECTION INTRAMUSCULAR; INTRAVENOUS EVERY 8 HOURS PRN
Status: DISCONTINUED | OUTPATIENT
Start: 2023-05-10 | End: 2023-05-11 | Stop reason: HOSPADM

## 2023-05-10 RX ORDER — CALCIUM CARBONATE 200(500)MG
1000 TABLET,CHEWABLE ORAL DAILY PRN
Status: DISCONTINUED | OUTPATIENT
Start: 2023-05-10 | End: 2023-05-11 | Stop reason: HOSPADM

## 2023-05-10 RX ORDER — DIAPER,BRIEF,INFANT-TODD,DISP
1 EACH MISCELLANEOUS DAILY PRN
Status: DISCONTINUED | OUTPATIENT
Start: 2023-05-10 | End: 2023-05-11 | Stop reason: HOSPADM

## 2023-05-10 RX ORDER — OXYTOCIN/RINGER'S LACTATE 30/500 ML
250 PLASTIC BAG, INJECTION (ML) INTRAVENOUS CONTINUOUS
Status: ACTIVE | OUTPATIENT
Start: 2023-05-10 | End: 2023-05-10

## 2023-05-10 RX ORDER — DIPHENHYDRAMINE HCL 25 MG
25 TABLET ORAL EVERY 6 HOURS PRN
Status: DISCONTINUED | OUTPATIENT
Start: 2023-05-10 | End: 2023-05-11 | Stop reason: HOSPADM

## 2023-05-10 RX ORDER — DOCUSATE SODIUM 100 MG/1
100 CAPSULE, LIQUID FILLED ORAL 2 TIMES DAILY PRN
Status: DISCONTINUED | OUTPATIENT
Start: 2023-05-10 | End: 2023-05-11 | Stop reason: HOSPADM

## 2023-05-10 RX ORDER — TERBUTALINE SULFATE 1 MG/ML
0.25 INJECTION, SOLUTION SUBCUTANEOUS ONCE
Status: COMPLETED | OUTPATIENT
Start: 2023-05-10 | End: 2023-05-10

## 2023-05-10 RX ORDER — OXYTOCIN/RINGER'S LACTATE 30/500 ML
1-30 PLASTIC BAG, INJECTION (ML) INTRAVENOUS
Status: DISCONTINUED | OUTPATIENT
Start: 2023-05-10 | End: 2023-05-10

## 2023-05-10 RX ORDER — ACETAMINOPHEN 325 MG/1
650 TABLET ORAL EVERY 4 HOURS PRN
Status: DISCONTINUED | OUTPATIENT
Start: 2023-05-10 | End: 2023-05-11 | Stop reason: HOSPADM

## 2023-05-10 RX ORDER — SIMETHICONE 80 MG
80 TABLET,CHEWABLE ORAL 4 TIMES DAILY PRN
Status: DISCONTINUED | OUTPATIENT
Start: 2023-05-10 | End: 2023-05-11 | Stop reason: HOSPADM

## 2023-05-10 RX ORDER — DOCUSATE SODIUM 100 MG/1
100 CAPSULE, LIQUID FILLED ORAL 2 TIMES DAILY
Status: DISCONTINUED | OUTPATIENT
Start: 2023-05-10 | End: 2023-05-11 | Stop reason: HOSPADM

## 2023-05-10 RX ADMIN — ACETAMINOPHEN 650 MG: 325 TABLET ORAL at 20:39

## 2023-05-10 RX ADMIN — Medication: at 04:25

## 2023-05-10 RX ADMIN — DOCUSATE SODIUM 100 MG: 100 CAPSULE, LIQUID FILLED ORAL at 20:39

## 2023-05-10 RX ADMIN — PENICILLIN G POTASSIUM 2.5 MILLION UNITS: 20000000 INJECTION, POWDER, FOR SOLUTION INTRAVENOUS at 03:30

## 2023-05-10 RX ADMIN — IBUPROFEN 600 MG: 600 TABLET, FILM COATED ORAL at 17:44

## 2023-05-10 RX ADMIN — ONDANSETRON 4 MG: 2 INJECTION INTRAMUSCULAR; INTRAVENOUS at 00:21

## 2023-05-10 RX ADMIN — TERBUTALINE SULFATE 0.25 MG: 1 INJECTION, SOLUTION SUBCUTANEOUS at 02:39

## 2023-05-10 RX ADMIN — WITCH HAZEL 1 PAD.: 500 SOLUTION RECTAL; TOPICAL at 06:14

## 2023-05-10 RX ADMIN — IBUPROFEN 600 MG: 600 TABLET, FILM COATED ORAL at 12:41

## 2023-05-10 RX ADMIN — HYDROCORTISONE 1 APPLICATION.: 1 CREAM TOPICAL at 06:13

## 2023-05-10 RX ADMIN — SODIUM CHLORIDE, SODIUM LACTATE, POTASSIUM CHLORIDE, AND CALCIUM CHLORIDE 999 ML/HR: .6; .31; .03; .02 INJECTION, SOLUTION INTRAVENOUS at 02:25

## 2023-05-10 RX ADMIN — Medication 2 MILLI-UNITS/MIN: at 00:30

## 2023-05-10 RX ADMIN — IBUPROFEN 600 MG: 600 TABLET, FILM COATED ORAL at 06:13

## 2023-05-10 RX ADMIN — Medication 250 MILLI-UNITS/MIN: at 05:26

## 2023-05-10 RX ADMIN — Medication 1 APPLICATION.: at 06:14

## 2023-05-10 NOTE — OB LABOR/OXYTOCIN SAFETY PROGRESS
Labor Progress Note - Salome Jon 33 y o  female MRN: [id number]    Unit/Bed#: -01 Encounter: [id number]       Contraction Frequency (minutes): 2-4  Contraction Quality: Mild, Moderate  Tachysystole: No   Cervical Dilation: 5-6  Cervical Effacement: 50  Fetal Station: -3  Baseline Rate: 120 bpm  Fetal Heart Rate: 120 BPM  FHR Category I with periods of Category II     Vital Signs:   Vitals:    05/09/23 1757   BP: 117/70   Pulse: 75   Resp: 20   Temp: 98 3 °F (36 8 °C)   SpO2:        Notes/comments:   Patient starting to become uncomfortable with contractions excellent cervical change following FB coming out  Patient planning to get epidural soon and if unchanged on next exam will plan fir pitocin titration      Sam Gandhi MD 5/9/2023 9:30 PM

## 2023-05-10 NOTE — OB LABOR/OXYTOCIN SAFETY PROGRESS
Oxytocin Safety Progress Check Note - Ana Jon 33 y o  female MRN: [id number]    Unit/Bed#: -01 Encounter: [id number]    Dose (abhi-units/min) Oxytocin: 6 abhi-units/min  Contraction Frequency (minutes): 2-3  Contraction Quality: Strong  Tachysystole: No   Cervical Dilation: 8        Cervical Effacement: 90  Fetal Station: 0  Baseline Rate: 125 bpm  Fetal Heart Rate: 125 BPM  FHR Category: Category I               Vital Signs:   Vitals:    05/10/23 0224   BP: 101/58   Pulse: 68   Resp:    Temp:    SpO2:        Notes/comments: immediately following last pit note, FHT with two further decelerations lasting 3 minutes and two minutes respectively  Patient repositioned, Terbutaline 0 25mg given  D/w Dr Rafael Pollock MD 5/10/2023 2:41 AM

## 2023-05-10 NOTE — L&D DELIVERY NOTE
Vaginal Delivery Summary - OB/GYN   Pili Jon 33 y o  female MRN: [id number]  Unit/Bed#: -01 Encounter: [id number]          Predelivery Diagnosis:  1  Pregnancy at 39 weeks gestation  2  GBS positive    Postdelivery Diagnosis:  1  Same as above  2  Delivery of term     Procedure: Spontaneous Vaginal Delivery    Attending: Kim Henderson MD    Assistant: Marleni Mares MD    Anesthesia: Epidural    QBL: 50 cc    Complications: none apparent    Specimens: cord blood, arterial and venous cord blood gasses, placenta to storage    Findings:   1  Delivery of viable male at 18, with APGARS of 9 and 9 at 1 and 5 minutes respectively  2  Spontaneous delivery of intact placenta with centrally inserted 3 vessel cord at 0529  3  Arterial cord gas pH: 7 225, BE: -8 5 ; Venous cord gas pH: 7 358, BE: -2 7  4  Intact perineum, left urethral abrasion that was hemostatic and thus not repaired  Brief history and labor course:  Ms Jackie Arredondo is a 33 y o   at 39 wks  She presented to labor and delivery for her scheduled elective IOL  Her pregnancy was complicated by GBS positive status  On initial exam she was noted to be /-3 and received a salgado bulb for cervical ripening- upon salgado bulb placement AROM occurred at 1423  Patient was initiated on penicillin for GBS prophylaxis and was adequately treated  She received an epidural for labor analgesia and was augmented with pitocin  She did have a prolonged deceleration with terbutaline administration that helped resuscitate and FHT returned to category 1 and pitocin was reinitiated and she progressed well to complete at 0518 and began to push at 0523  Description of procedure    After pushing for 1 minutes, at 0524 patient delivered a viable male , wt pending, apgars of 9 (1 min) and 9 (5 min)  The fetal vertex delivered spontaneously in SILVIA position  Baby was checked for nuchal- no nuchal was identified   The anterior right shoulder delivered atraumatically with maternal expulsive forces and the assistance of gentle downward traction  The posterior left shoulder delivered with maternal expulsive forces and the assistance of gentle upward traction  The remainder of the fetus delivered spontaneously  Upon delivery, the infant was placed on the mothers abdomen and the cord was clamped and cut  Delayed cord clamping was completed  There was no evidence for injury to the   Awaiting nurse resuscitators evaluated the   Arterial and venous cord blood gases and cord blood was collected for analysis  These were promptly sent to the lab  In the immediate post-partum, 30 units of IV pitocin was administered, and the uterus was noted to contract down well with massage and pitocin  The placenta delivered spontaneously at 0529 and was noted to have a centrally inserted 3 vessel cord  The vagina, cervix, perineum, and rectum were inspected and there was noted to be an intact perineum with left urethral abrasion that was hemostatic  At the conclusion of the procedure, all needle, sponge, and instrument counts were noted to be correct  Patient tolerated the procedure well and was allowed to recover in labor and delivery room with family and  before being transferred to the post-partum floor       Rayburn [facility]  5/10/2023  5:36 AM

## 2023-05-10 NOTE — DISCHARGE SUMMARY
Obstetrics Discharge Summary  Arturo Jon 33 y o  female MRN: [id number]  Unit/Bed#: -01 Encounter: [id number]    Admission Date: 2023     Discharge Date: 23    Admitting Attending: Brad Lemus  Delivery Attending: Brad Lemus  Discharging Attending: Mary Lou Alexis MD    Admitting Diagnoses:   Pregnancy at 39w  Anemia  Depression    Discharge Diagnoses:   Same, delivered    Procedures: spontaneous vaginal delivery    Anesthesia: epidural    Hospital course: Singh Hernandes is now a 33 y o  [id number] who was initially admitted at 39w3d for elective induction  At presentation, she was 1/30/-3  A salgado balloon was placed, and she was started on pitocin  She received an epidural for analgesia  FHT showed a prolonged 8 minute deceleration that resolved with maternal repositioning and administration of terbutaline  After allowing baby to recover, pitocin was restarted    She progressed to complete cervical dilation and began pushing  On 5/10/23 she delivered a viable male  39w4d via normal spontaneous vaginal delivery  She sustained no laceration during delivery  Apgars were 9 (1 min) and 9 (5 min)   was admitted to the  nursery  Patient tolerated the procedure well  Her post-delivery course was uncomplicated  Her postpartum pain was well controlled with oral analgesics  Maternal blood type is A positive so RhoGAM was not indicated  On day of discharge, she was ambulating and able to reasonably perform all ADLs  She was voiding and had appropriate bowel function  Pain was well controlled  She was discharged home on postpartum day #1 without complications  Patient was instructed to follow up with her OBGYN as an outpatient and was given appropriate warnings to call provider if she develops signs of infection or uncontrolled pain      Complications: none apparent    Condition at discharge: good     Provisions for Follow-Up Care:  Please see after visit summary for information related to follow-up care and any pertinent home health orders  Disposition: Home    Planned Readmission: No    Discharge Medications:   Please see AVS for a complete list of discharge medications  Discharge instructions :   Please see AVS for complete discharge instructions      Maria Luz Chaney MD  05/11/23

## 2023-05-10 NOTE — ANESTHESIA PREPROCEDURE EVALUATION
Procedure:  LABOR ANALGESIA    Relevant Problems   CARDIO   (+) Migraine without aura and without status migrainosus, not intractable      GYN   (+) 39 weeks gestation of pregnancy      HEMATOLOGY   (+) Iron deficiency anemia      NEURO/PSYCH   (+) Migraine without aura and without status migrainosus, not intractable        Physical Exam    Airway    Mallampati score: II  TM Distance: >3 FB  Neck ROM: full     Dental       Cardiovascular      Pulmonary      Other Findings        Anesthesia Plan  ASA Score- 2     Anesthesia Type- epidural with ASA Monitors  Additional Monitors:   Airway Plan:           Plan Factors-    Chart reviewed  Existing labs reviewed  Patient summary reviewed              Induction-     Postoperative Plan-     Informed Consent-

## 2023-05-10 NOTE — OB LABOR/OXYTOCIN SAFETY PROGRESS
Oxytocin Safety Progress Check Note - Nayeli Jon 33 y o  female MRN: [id number]    Unit/Bed#: -01 Encounter: [id number]    Dose (abhi-units/min) Oxytocin: 2 abhi-units/min (as per Dr Doll Seen)  Contraction Frequency (minutes): 1-3  Contraction Quality: Strong  Tachysystole: No   Cervical Dilation: 9        Cervical Effacement: 90  Fetal Station: 0  Baseline Rate: 125 bpm  Fetal Heart Rate: 130 BPM  FHR Category: Category I               Vital Signs:   Vitals:    05/10/23 0409   BP: 101/66   Pulse: 75   Resp:    Temp:    SpO2:        Notes/comments:   Patient feeling more pressure  SVE as above  Will restart pitocin  Tracing category 1    D/w Dr Bill Lawrence MD 5/10/2023 4:22 AM

## 2023-05-10 NOTE — CASE MANAGEMENT
Case Management Progress Note    Patient name Clemetine Liner  Location /-56 MRN [id number]  : 1995 Date 5/10/2023       LOS (days): 1  Geometric Mean LOS (GMLOS) (days):   Days to GMLOS:        OBJECTIVE:        Current admission status: Inpatient  Preferred Pharmacy:   [id number]  [place] [address]  Phone: [phone] Fax: [phone]    [id number] [place], [address],   [address]  [place] [address] [place] [address]  Phone: [phone] Fax: [phone]    Primary Care Provider: Joe Orozco MD    Primary Insurance: Shanti Cueva  Secondary Insurance:     PROGRESS NOTE:      CM received consult for MOB with hx THC use in the last 2 years  CM screened both MOB and baby charts  Both MOB and baby UDS results negative for any substance  No futher CM action indicated at this time  Plan for baby to d/c home with MOB when otherwise able

## 2023-05-10 NOTE — OB LABOR/OXYTOCIN SAFETY PROGRESS
Oxytocin Safety Progress Check Note - Nayeli Jon 33 y o  female MRN: [id number]    Unit/Bed#: -01 Encounter: [id number]    Dose (abhi-units/min) Oxytocin: 6 abhi-units/min  Contraction Frequency (minutes): 2-3  Contraction Quality: Strong  Tachysystole: No   Cervical Dilation: 8        Cervical Effacement: 90  Fetal Station: 0  Baseline Rate: 125 bpm  Fetal Heart Rate: 125 BPM  FHR Category: Category I               Vital Signs:   Vitals:    05/10/23 0209   BP: 102/59   Pulse: 64   Resp:    Temp:    SpO2:        Notes/comments: Prolonged deceleration from 7775-3942, SVE at time 8/90/0  Pitocin discontinued, maternal repositioning to hands and knees, IVF bolus and supplemental maternal O2 applied  Terbutaline in room and prepared for administration, but not given as FHT recovered with interventions  Will leave pitocin off for at least 30 minutes of category I tracing  Patient making good cervical change   Dr Ana Pérez aware         Holger Holman MD 5/10/2023 2:32 AM

## 2023-05-10 NOTE — ANESTHESIA PROCEDURE NOTES
Epidural Block    Start time: 5/9/2023 10:13 PM  Reason for block: procedure for pain and at surgeon's request  Staffing  Performed: CRNA   Anesthesiologist: Evelyn Espana MD  Resident/CRNA: Crsytal Henry CRNA  Preanesthetic Checklist  Completed: patient identified, IV checked, site marked, risks and benefits discussed, surgical consent, monitors and equipment checked, pre-op evaluation and timeout performed  Epidural  Patient position: sitting  Prep: ChloraPrep  Patient monitoring: cardiac monitor and frequent blood pressure checks  Approach: midline  Location: lumbar  Injection technique: SHANNAN saline  Needle  Needle type: Tuohy   Needle gauge: 18 G  Catheter type: side hole  Catheter size: 20 G  Catheter securement method: stabilization device  Test dose: negative  Assessment  Number of attempts: 1negative aspiration for CSF, negative aspiration for heme and no paresthesia on injection  patient tolerated the procedure well with no immediate complications

## 2023-05-10 NOTE — ANESTHESIA POSTPROCEDURE EVALUATION
Post-Op Assessment Note    CV Status:  Stable       Mental Status:  Awake   Hydration Status:  Stable   Airway Patency:  Patent      Post Op Vitals Reviewed: Yes      Staff: CRNA         No notable events documented      /63 (05/10/23 0830)        Pulse 71 (05/10/23 4563)

## 2023-05-11 VITALS
DIASTOLIC BLOOD PRESSURE: 56 MMHG | HEIGHT: 65 IN | WEIGHT: 201 LBS | RESPIRATION RATE: 18 BRPM | SYSTOLIC BLOOD PRESSURE: 103 MMHG | TEMPERATURE: 98.1 F | OXYGEN SATURATION: 98 % | BODY MASS INDEX: 33.49 KG/M2 | HEART RATE: 62 BPM

## 2023-05-11 RX ORDER — DOCUSATE SODIUM 100 MG/1
100 CAPSULE, LIQUID FILLED ORAL 2 TIMES DAILY
Qty: 30 CAPSULE | Refills: 0 | Status: SHIPPED | OUTPATIENT
Start: 2023-05-11

## 2023-05-11 RX ORDER — IBUPROFEN 600 MG/1
600 TABLET ORAL EVERY 6 HOURS
Qty: 30 TABLET | Refills: 0 | Status: SHIPPED | OUTPATIENT
Start: 2023-05-11

## 2023-05-11 RX ORDER — ACETAMINOPHEN 325 MG/1
650 TABLET ORAL EVERY 4 HOURS PRN
Refills: 0
Start: 2023-05-11

## 2023-05-11 RX ADMIN — DOCUSATE SODIUM 100 MG: 100 CAPSULE, LIQUID FILLED ORAL at 08:25

## 2023-05-11 RX ADMIN — ACETAMINOPHEN 650 MG: 325 TABLET ORAL at 08:25

## 2023-05-11 RX ADMIN — IBUPROFEN 600 MG: 600 TABLET, FILM COATED ORAL at 05:13

## 2023-05-11 RX ADMIN — IBUPROFEN 600 MG: 600 TABLET, FILM COATED ORAL at 00:17

## 2023-05-11 NOTE — ASSESSMENT & PLAN NOTE
Lochia WNL   Recovering well   Appropriate bowel and bladder function   Pain well controlled   Tolerating diet   Breastfeeding: no, bottle  Ambulating without issues   No lower extremity tenderness  GBS positive,adequately treated  Rh positive

## 2023-05-11 NOTE — PLAN OF CARE
Problem: BIRTH - VAGINAL/ SECTION  Goal: Fetal and maternal status remain reassuring during the birth process  Description: INTERVENTIONS:  - Monitor vital signs  - Monitor fetal heart rate  - Monitor uterine activity  - Monitor labor progression (vaginal delivery)  - DVT prophylaxis  - Antibiotic prophylaxis  Outcome: Progressing  Goal: Emotionally satisfying birthing experience for mother/fetus  Description: Interventions:  - Assess, plan, implement and evaluate the nursing care given to the patient in labor  - Advocate the philosophy that each childbirth experience is a unique experience and support the family's chosen level of involvement and control during the labor process   - Actively participate in both the patient's and family's teaching of the birth process  - Consider cultural, Muslim and age-specific factors and plan care for the patient in labor  Outcome: Progressing     Problem: PAIN - ADULT  Goal: Verbalizes/displays adequate comfort level or baseline comfort level  Description: Interventions:  - Encourage patient to monitor pain and request assistance  - Assess pain using appropriate pain scale  - Administer analgesics based on type and severity of pain and evaluate response  - Implement non-pharmacological measures as appropriate and evaluate response  - Consider cultural and social influences on pain and pain management  - Notify physician/advanced practitioner if interventions unsuccessful or patient reports new pain  Outcome: Progressing     Problem: INFECTION - ADULT  Goal: Absence or prevention of progression during hospitalization  Description: INTERVENTIONS:  - Assess and monitor for signs and symptoms of infection  - Monitor lab/diagnostic results  - Monitor all insertion sites, i e  indwelling lines, tubes, and drains  - Monitor endotracheal if appropriate and nasal secretions for changes in amount and color  - [place] appropriate cooling/warming therapies per order  - Administer medications as ordered  - Instruct and encourage patient and family to use good hand hygiene technique  - Identify and instruct in appropriate isolation precautions for identified infection/condition  Outcome: Progressing  Goal: Absence of fever/infection during neutropenic period  Description: INTERVENTIONS:  - Monitor WBC    Outcome: Progressing     Problem: SAFETY ADULT  Goal: Patient will remain free of falls  Description: INTERVENTIONS:  - Educate patient/family on patient safety including physical limitations  - Instruct patient to call for assistance with activity   - Consult OT/PT to assist with strengthening/mobility   - Keep Call bell within reach  - Keep bed low and locked with side rails adjusted as appropriate  - Keep care items and personal belongings within reach  - Initiate and maintain comfort rounds  - Make Fall Risk Sign visible to staff  - Obtain necessary fall risk management equipment  - Apply yellow socks and bracelet for high fall risk patients  - Consider moving patient to room near nurses station  Outcome: Progressing  Goal: Maintain or return to baseline ADL function  Description: INTERVENTIONS:  -  Assess patient's ability to carry out ADLs; assess patient's baseline for ADL function and identify physical deficits which impact ability to perform ADLs (bathing, care of mouth/teeth, toileting, grooming, dressing, etc )  - Assess/evaluate cause of self-care deficits   - Assess range of motion  - Assess patient's mobility; develop plan if impaired  - Assess patient's need for assistive devices and provide as appropriate  - Encourage maximum independence but intervene and supervise when necessary  - Involve family in performance of ADLs  - Assess for home care needs following discharge   - Consider OT consult to assist with ADL evaluation and planning for discharge  - Provide patient education as appropriate  Outcome: Progressing  Goal: Maintains/Returns to pre admission functional level  Description: INTERVENTIONS:  - Perform BMAT or MOVE assessment daily    - Set and communicate daily mobility goal to care team and patient/family/caregiver  - Collaborate with rehabilitation services on mobility goals if consulted  - Out of bed for toileting  - Record patient progress and toleration of activity level   Outcome: Progressing     Problem: Knowledge Deficit  Goal: Patient/family/caregiver demonstrates understanding of disease process, treatment plan, medications, and discharge instructions  Description: Complete learning assessment and assess knowledge base    Interventions:  - Provide teaching at level of understanding  - Provide teaching via preferred learning methods  Outcome: Progressing     Problem: DISCHARGE PLANNING  Goal: Discharge to home or other facility with appropriate resources  Description: INTERVENTIONS:  - Identify barriers to discharge w/patient and caregiver  - Arrange for needed discharge resources and transportation as appropriate  - Identify discharge learning needs (meds, wound care, etc )  - Arrange for interpretive services to assist at discharge as needed  - Refer to Case Management Department for coordinating discharge planning if the patient needs post-hospital services based on physician/advanced practitioner order or complex needs related to functional status, cognitive ability, or social support system  Outcome: Progressing

## 2023-05-11 NOTE — UTILIZATION REVIEW
"NOTIFICATION OF INPATIENT ADMISSION   MATERNITY/DELIVERY AUTHORIZATION REQUEST   SERVICING FACILITY:   [facility] L&D, [place], NICU  [address]  Tax ID: [id number]  NPI: [id number]   ATTENDING PROVIDER:  Attending Name and NPI#: Valente Lauren Md [[id number]]  Address: [address] [place] [address]  Phone: [phone]   ADMISSION INFORMATION:  Place of Service: Inpatient [address] [facility]  [address]  Place of Service Code: 21  Inpatient Admission Date/Time: 23 12:29 PM  Discharge Date/Time: 2023 12:00 PM  Admitting Diagnosis Code/Description:  Encounter for elective induction of labor [Z34 90]  44 weeks gestation of pregnancy [Z3A 39]  Encounter for full-term uncomplicated delivery [O51]     Mother: Marcela Long 1995 Estimated Date of Delivery: 23  Delivering clinician: Jordy Baxter    OB History        3    Para   2    Term   2            AB   1    Living   2       SAB   1    IAB        Ectopic        Multiple   0    Live Births   2                Name & MRN:   Information for the patient's :  Gurvinder Isidro Pace [facility]) [[id number]]     [place] Delivery Information:  Sex: male  Delivered 5/10/2023 5:24 AM by Vaginal, Spontaneous; Gestational Age: 43w3d    [place] Measurements:  Weight: 7 lb 12 5 oz (3530 g); Height: 20 75\"    APGAR 1 minute 5 minutes 10 minutes   Totals: 9 9       Birth Information: 33 y o  female MRN: [id number] Unit/Bed#: -01   Birthweight: No birth weight on file  Gestational Age: <None> Delivery Type:    APGARS Totals:        UTILIZATION REVIEW CONTACT:  [facility] Utilization   Network Utilization Review Department  Phone: [phone]  Fax [phone]  Email: [email or web address]  Contact for approvals/pending authorizations, clinical reviews, and discharge       PHYSICIAN ADVISORY SERVICES:  Medical Necessity " Denial & Dxpq[facility] Review  Phone: [phone]  Fax: [phone]  Email: [email or web address]

## 2023-05-11 NOTE — PROGRESS NOTES
"Progress Note - OB/GYN  Ze Jon 33 y o  female MRN: [id number]  Unit/Bed#:  311-01 Encounter: [id number]    Assessment and Plan     Ron Wolf is a patient of: [facility]   She is PPD# 1 s/p   Recovering well and is stable       39 weeks gestation of pregnancy  Assessment & Plan        Iron deficiency anemia  Assessment & Plan  11 3, admission cbc 11 0    Bipolar 2 disorder (HCC)  Assessment & Plan  Monitor symptoms    Symptoms of depression  Assessment & Plan  Monitor symptoms    Family history of genetic disease  Assessment & Plan  Overview:   Patient's father has Pompe disease  Patient declined carrier screening    Family history of congenital anomaly  Assessment & Plan  Cleft lip/cleft palate  Patient declined diagnostic testing    *  (spontaneous vaginal delivery)  Assessment & Plan  Lochia WNL   Recovering well   Appropriate bowel and bladder function   Pain well controlled   Tolerating diet   Breastfeeding: no, bottle  Ambulating without issues   No lower extremity tenderness  GBS positive,adequately treated  Rh positive       Disposition    - Anticipate discharge home on PPD# 1      Subjective/Objective     Chief Complaint: Postpartum State     Subjective:    Ron Wolf is PPD#1 s/p   She has no current complaints  Pain is well controlled  Patient is currently voiding  She is ambulating  Patient is currently passing flatus and has had no bowel movement  She is tolerating PO, and denies nausea or vomitting  Patient denies fever, chills, chest pain, shortness of breath, or calf tenderness  Lochia is normal  She is  Bottle feeding  She is recovering well and is stable         Vitals:   /55 (BP Location: Right arm)   Pulse 56   Temp 97 7 °F (36 5 °C) (Oral)   Resp 20   Ht 5' 5\" (1 651 m)   Wt 91 2 kg (201 lb)   LMP 2022   SpO2 98%   Breastfeeding No   BMI 33 45 kg/m²       Intake/Output Summary (Last 24 hours) at 2023 1727  Last data filed at " 5/10/2023 1304  Gross per 24 hour   Intake --   Output 1350 ml   Net -1350 ml       Invasive Devices     Peripheral Intravenous Line  Duration           Peripheral IV 05/09/23 Right;Ventral (anterior) Forearm 1 day                Physical Exam:   GEN: Gayle Hurst appears well, alert and oriented x 3, pleasant and cooperative   CARDIO: RRR, no murmurs or rubs  RESP:  CTAB, no wheezes or rales  ABDOMEN: soft, no tenderness, no distention, fundus @ 2 cm below u ,EXTREMITIES: non tender, no erythema      Labs:     Hemoglobin   Date Value Ref Range Status   05/09/2023 11 0 (L) 11 5 - 15 4 g/dL Final   04/27/2023 11 3 (L) 11 5 - 15 4 g/dL Final     WBC   Date Value Ref Range Status   05/09/2023 9 29 4 31 - 10 16 Thousand/uL Final   04/27/2023 8 99 4 31 - 10 16 Thousand/uL Final     Platelets   Date Value Ref Range Status   05/09/2023 211 149 - 390 Thousands/uL Final   04/27/2023 194 149 - 390 Thousands/uL Final     Creatinine   Date Value Ref Range Status   02/14/2023 0 51 (L) 0 60 - 1 30 mg/dL Final     Comment:     Standardized to IDMS reference method   03/30/2022 0 72 0 60 - 1 30 mg/dL Final     Comment:     Standardized to IDMS reference method     AST   Date Value Ref Range Status   02/14/2023 12 (L) 13 - 39 U/L Final     Comment:     Specimen collection should occur prior to Sulfasalazine administration due to the potential for falsely depressed results  03/30/2022 17 5 - 45 U/L Final     Comment:     Specimen collection should occur prior to Sulfasalazine administration due to the potential for falsely depressed results  ALT   Date Value Ref Range Status   02/14/2023 10 7 - 52 U/L Final     Comment:     Specimen collection should occur prior to Sulfasalazine administration due to the potential for falsely depressed results  03/30/2022 23 12 - 78 U/L Final     Comment:     Specimen collection should occur prior to Sulfasalazine administration due to the potential for falsely depressed results  Yamilex De Anda MD  5/11/2023  6:11 AM

## 2023-05-11 NOTE — PLAN OF CARE
Problem: BIRTH - VAGINAL/ SECTION  Goal: Fetal and maternal status remain reassuring during the birth process  Description: INTERVENTIONS:  - Monitor vital signs  - Monitor fetal heart rate  - Monitor uterine activity  - Monitor labor progression (vaginal delivery)  - DVT prophylaxis  - Antibiotic prophylaxis  Outcome: Adequate for Discharge  Goal: Emotionally satisfying birthing experience for mother/fetus  Description: Interventions:  - Assess, plan, implement and evaluate the nursing care given to the patient in labor  - Advocate the philosophy that each childbirth experience is a unique experience and support the family's chosen level of involvement and control during the labor process   - Actively participate in both the patient's and family's teaching of the birth process  - Consider cultural, Moravian and age-specific factors and plan care for the patient in labor  Outcome: Adequate for Discharge     Problem: PAIN - ADULT  Goal: Verbalizes/displays adequate comfort level or baseline comfort level  Description: Interventions:  - Encourage patient to monitor pain and request assistance  - Assess pain using appropriate pain scale  - Administer analgesics based on type and severity of pain and evaluate response  - Implement non-pharmacological measures as appropriate and evaluate response  - Consider cultural and social influences on pain and pain management  - Notify physician/advanced practitioner if interventions unsuccessful or patient reports new pain  Outcome: Adequate for Discharge     Problem: INFECTION - ADULT  Goal: Absence or prevention of progression during hospitalization  Description: INTERVENTIONS:  - Assess and monitor for signs and symptoms of infection  - Monitor lab/diagnostic results  - Monitor all insertion sites, i e  indwelling lines, tubes, and drains  - Monitor endotracheal if appropriate and nasal secretions for changes in amount and color  - [place] appropriate cooling/warming therapies per order  - Administer medications as ordered  - Instruct and encourage patient and family to use good hand hygiene technique  - Identify and instruct in appropriate isolation precautions for identified infection/condition  Outcome: Adequate for Discharge  Goal: Absence of fever/infection during neutropenic period  Description: INTERVENTIONS:  - Monitor WBC    Outcome: Adequate for Discharge     Problem: SAFETY ADULT  Goal: Patient will remain free of falls  Description: INTERVENTIONS:  - Educate patient/family on patient safety including physical limitations  - Instruct patient to call for assistance with activity   - Consult OT/PT to assist with strengthening/mobility   - Keep Call bell within reach  - Keep bed low and locked with side rails adjusted as appropriate  - Keep care items and personal belongings within reach  - Initiate and maintain comfort rounds  - Apply yellow socks and bracelet for high fall risk patients  - Consider moving patient to room near nurses station  Outcome: Adequate for Discharge  Goal: Maintain or return to baseline ADL function  Description: INTERVENTIONS:  -  Assess patient's ability to carry out ADLs; assess patient's baseline for ADL function and identify physical deficits which impact ability to perform ADLs (bathing, care of mouth/teeth, toileting, grooming, dressing, etc )  - Assess/evaluate cause of self-care deficits   - Assess range of motion  - Assess patient's mobility; develop plan if impaired  - Assess patient's need for assistive devices and provide as appropriate  - Encourage maximum independence but intervene and supervise when necessary  - Involve family in performance of ADLs  - Assess for home care needs following discharge   - Consider OT consult to assist with ADL evaluation and planning for discharge  - Provide patient education as appropriate  Outcome: Adequate for Discharge  Goal: Maintains/Returns to pre admission functional level  Description: INTERVENTIONS:  - Perform BMAT or MOVE assessment daily    - Set and communicate daily mobility goal to care team and patient/family/caregiver  - Collaborate with rehabilitation services on mobility goals if consulted  - Out of bed for toileting  - Record patient progress and toleration of activity level   Outcome: Adequate for Discharge     Problem: Knowledge Deficit  Goal: Patient/family/caregiver demonstrates understanding of disease process, treatment plan, medications, and discharge instructions  Description: Complete learning assessment and assess knowledge base    Interventions:  - Provide teaching at level of understanding  - Provide teaching via preferred learning methods  Outcome: Adequate for Discharge     Problem: DISCHARGE PLANNING  Goal: Discharge to home or other facility with appropriate resources  Description: INTERVENTIONS:  - Identify barriers to discharge w/patient and caregiver  - Arrange for needed discharge resources and transportation as appropriate  - Identify discharge learning needs (meds, wound care, etc )  - Arrange for interpretive services to assist at discharge as needed  - Refer to Case Management Department for coordinating discharge planning if the patient needs post-hospital services based on physician/advanced practitioner order or complex needs related to functional status, cognitive ability, or social support system  Outcome: Adequate for Discharge

## 2023-05-16 LAB — PLACENTA IN STORAGE: NORMAL

## 2023-05-31 ENCOUNTER — TELEPHONE (OUTPATIENT)
Dept: OBGYN CLINIC | Facility: CLINIC | Age: 28
End: 2023-05-31

## 2023-05-31 NOTE — TELEPHONE ENCOUNTER
Pt called and said she would like her post partum appt on 6/12 to be scheduled to 6/15 or 6/12 in the morning

## 2023-06-15 ENCOUNTER — POSTPARTUM VISIT (OUTPATIENT)
Dept: OBGYN CLINIC | Facility: CLINIC | Age: 28
End: 2023-06-15
Payer: COMMERCIAL

## 2023-06-15 VITALS
WEIGHT: 183 LBS | BODY MASS INDEX: 30.49 KG/M2 | DIASTOLIC BLOOD PRESSURE: 74 MMHG | HEIGHT: 65 IN | SYSTOLIC BLOOD PRESSURE: 120 MMHG

## 2023-06-15 DIAGNOSIS — R87.612 LGSIL ON PAP SMEAR OF CERVIX: ICD-10-CM

## 2023-06-15 DIAGNOSIS — Z87.42 HISTORY OF ABNORMAL CERVICAL PAP SMEAR: ICD-10-CM

## 2023-06-15 PROCEDURE — G0124 SCREEN C/V THIN LAYER BY MD: HCPCS | Performed by: PATHOLOGY

## 2023-06-15 PROCEDURE — G0145 SCR C/V CYTO,THINLAYER,RESCR: HCPCS | Performed by: PATHOLOGY

## 2023-06-15 NOTE — PROGRESS NOTES
"Postpartum Visit  Jose Ewing MD    06/15/23      Subjective     Indy Farr is a 33 y o  [id number] female who presents for a postpartum visit  She is s/p  at 39w4d on 05/10/23 (Dr Silvia Galarza)  She delivered a male   Laceration: none    Lochia is thin lochia  Bowel function is normal    Bladder function is normal    Eating ok  Patient has not been sexually active  Desired contraception method is unknown    [place] score: 4    Gestational Diabetes: no  Gestational HTN/Preeclampsia: no  Pregnancy Complications: LSIL pap, recommend colposcopy, did not want     The following portions of the patient's history were reviewed and updated as appropriate: She  has a past medical history of Anxiety, Depression, Migraines, Miscarriage, and Varicella  She  has no past surgical history on file  She is allergic to shellfish-derived products - food allergy       No current outpatient medications on file  Allergies   Allergen Reactions   • Shellfish-Derived Products - Food Allergy Anaphylaxis       Review of Systems  Constitutional: no fever, feels well  Breasts: no complaints  Gastrointestinal: no complaints  Genitourinary: as noted in HPI  Neurological: no complaints of headache    Objective    /74 (BP Location: Left arm, Patient Position: Sitting, Cuff Size: Standard)   Ht 5' 5\" (1 651 m)   Wt 83 kg (183 lb)   LMP 2022   Breastfeeding No   BMI 30 45 kg/m²   Physical Exam  Constitutional:       Appearance: Normal appearance  She is normal weight  HENT:      Head: Normocephalic  Eyes:      Extraocular Movements: Extraocular movements intact  Conjunctiva/sclera: Conjunctivae normal    Pulmonary:      Effort: Pulmonary effort is normal    Abdominal:      General: There is no distension  Palpations: Abdomen is soft  Tenderness: There is no abdominal tenderness     Genitourinary:     Comments: Vulva: normal, no lesions  Vagina: normal, no lesions or ttp  Urethra: normal, no " lesions, masses or ttp  Bladder: normal, no masses or ttp  Cervix: normal, no lesions, masses or CMT  Uterus: normal-size, normal mobility  Adnexa: no masses or ttp    Musculoskeletal:         General: Normal range of motion  Cervical back: Normal range of motion  Skin:     General: Skin is warm and dry  Neurological:      General: No focal deficit present  Psychiatric:         Mood and Affect: Mood normal          Behavior: Behavior normal          Thought Content: Thought content normal            Assessment/Plan:  Wojciech Mckeon is a 33 y o  who is postpartum from a  with a normal postpartum examination  · Discussed LSIL pap, recommendation for colposcopy postpartum at 6 weeks, usually perform pap at the same time  · Would prefer pap smear now, and if abnormal, will pursue colposcopy, if normal would prefer to defer  · Explained that given 3 abnormal paps in a row and recommendation for colposcopy, rather than continue cycle of yearly colposcopy can also opt for excisional procedure with CKC/LEEP  · Will consider  · Pap collected today  · Increase activity as tolerated, may resume all normal activity

## 2023-06-19 ENCOUNTER — TELEPHONE (OUTPATIENT)
Dept: OBGYN CLINIC | Facility: CLINIC | Age: 28
End: 2023-06-19

## 2023-06-19 DIAGNOSIS — Z30.011 INITIATION OF ORAL CONTRACEPTION: Primary | ICD-10-CM

## 2023-06-19 RX ORDER — DROSPIRENONE AND ETHINYL ESTRADIOL 0.02-3(28)
1 KIT ORAL DAILY
Qty: 84 TABLET | Refills: 0 | Status: SHIPPED | OUTPATIENT
Start: 2023-06-19

## 2023-06-19 NOTE — TELEPHONE ENCOUNTER
Pt lmom - had a recent appt and discussed birth control and originally said did not want to be on anything, but now would like to start the pill   Not sure if need an appt or could get a prescription to start

## 2023-06-19 NOTE — TELEPHONE ENCOUNTER
Pt states she has decided she would like prescription for birth control pill as she was unsure at time of recent pp appt  Pt states she was on Nesha in the past, did well and was happy while taking however when she stopped taking she experienced issues with acne  Pt states she is not breastfeeding  Reviewed with pt obtaining negative UPT prior to starting, would recommend to begin the pill on the Sunday of the week of next period, starting with the first pill in the packet (If period starts on a Sunday - start the pill that very same day; if period starts any other day of the week - wait until the Sunday of that week to start with the first pill)  Will start this Sunday after being 6 wks PP  Take it the same time daily, within the same hour time frame  It can be common to experience some irregular bleeding when newly starting the pill and aware this should resolve after 3 months of use  Also minor side effects such as breast tenderness, minor headaches and nausea may occur, but typically resolve after continuing on the pill for at least 3 months  Aware to call if experiences severe headaches, visual disturbances, chest pain, shortness of breath, abdominal pain, pain tingling or weakness in arms or legs  Advised a back-up method, like condoms, during the first month on the OCP, if misses any pills, or is put on antibiotics, as well as safe sexual practices and the importance of condoms to prevent the transmission of STDs  TT sent to on call provider to review and okay to send script  [employer] message sent to pt to make aware

## 2023-06-27 LAB
LAB AP GYN PRIMARY INTERPRETATION: ABNORMAL
Lab: ABNORMAL
PATH INTERP SPEC-IMP: ABNORMAL

## 2023-07-03 ENCOUNTER — PATIENT MESSAGE (OUTPATIENT)
Dept: OBGYN CLINIC | Facility: CLINIC | Age: 28
End: 2023-07-03

## 2023-07-06 ENCOUNTER — PATIENT MESSAGE (OUTPATIENT)
Dept: OBGYN CLINIC | Facility: CLINIC | Age: 28
End: 2023-07-06

## 2023-07-06 DIAGNOSIS — R79.1 PROLONGED BLEEDING TIME: ICD-10-CM

## 2023-07-06 DIAGNOSIS — R42 DIZZINESS: Primary | ICD-10-CM

## 2023-07-06 NOTE — PATIENT COMMUNICATION
Spoke to Dr. Valerio Tay, patient  double up on her birth control for two days and then return to daily should help slow down bleeding. If lightheaded or dizzy intermittently should hydrate and rest can have CBC ordered but if persistent or if associated with chest pain or shortness of breath then needs to be seen in ED     Order placed for CBC. [employer] message sent to patient (okay per communication form).

## 2023-07-06 NOTE — TELEPHONE ENCOUNTER
From: Nayeli Jon  To: Diana Murry  Sent: 7/3/2023 9:13 AM EDT  Subject: Postpartum Questions     Good morning! I have two questions. My first question, is it normal for my first period after child birth to last longer? I'm on day 8 and i'm assuming it is normal but wanted to check and make sure. My second question, it's been almost two months postpartum and i am still leaking from my breasts. I have tried everything I could to dry up my supply but nothing seems to be working.  Is there anything I can do for this? thanks so much

## 2023-07-06 NOTE — PATIENT COMMUNICATION
Patient delivered on 5/10/2023. Patient started her first postpartum menses on 6/25/23, 11 days ago. Medium flow, consistent, bright red in color. Little clots. Never breast fed. No cramping or abdominal pain. Dizzy at times, started yesterday. No chest pain, SOB. Patient is taking tylenol occasionally for headaches. Started birth control the day her period started. No chance of pregnancy. Will reach out to Dr. Shilpa Zambrano, on call provider for recommendations.

## 2023-07-06 NOTE — TELEPHONE ENCOUNTER
From: Nayeli Jon  To: Aman Murry  Sent: 7/3/2023 9:13 AM EDT  Subject: Postpartum Questions     Good morning! I have two questions. My first question, is it normal for my first period after child birth to last longer? I'm on day 8 and i'm assuming it is normal but wanted to check and make sure. My second question, it's been almost two months postpartum and i am still leaking from my breasts. I have tried everything I could to dry up my supply but nothing seems to be working.  Is there anything I can do for this? thanks so much

## 2023-07-11 ENCOUNTER — TELEPHONE (OUTPATIENT)
Dept: OBGYN CLINIC | Facility: CLINIC | Age: 28
End: 2023-07-11

## 2023-07-11 ENCOUNTER — PATIENT MESSAGE (OUTPATIENT)
Dept: OBGYN CLINIC | Facility: CLINIC | Age: 28
End: 2023-07-11

## 2023-07-11 DIAGNOSIS — N93.9 ABNORMAL UTERINE BLEEDING: Primary | ICD-10-CM

## 2023-07-11 NOTE — TELEPHONE ENCOUNTER
From: Cheryl Benitez  Sent: 7/11/2023 9:49 AM EDT  To: [facility]  Subject: Postpartum Questions     i'm going to go thursday for the blood work and no dizziness, none of the things mentioned. the flow is medium changing a pad every 2-3 hours at a regular size one so about average.

## 2023-07-11 NOTE — TELEPHONE ENCOUNTER
Called to review AUB postpartum  No answer  Ordered TSH and pelvic US in addition to CBC  Also needs colposcopy postpartum  Abnormal bleeding could be uterine and/or cervical--can we move up her appt? Thanks!

## 2023-07-13 NOTE — TELEPHONE ENCOUNTER
Lmom- wanted to let patient know labs and us were in for you to complete, also let her know wanted to get her scheduled for a sooner appointment.

## 2023-08-03 PROBLEM — N94.6 DYSMENORRHEA: Status: RESOLVED | Noted: 2019-08-06 | Resolved: 2023-08-03

## 2023-08-03 PROBLEM — N92.6 IRREGULAR BLEEDING: Status: RESOLVED | Noted: 2019-03-08 | Resolved: 2023-08-03

## 2023-08-03 PROBLEM — Z00.00 ANNUAL PHYSICAL EXAM: Status: ACTIVE | Noted: 2023-08-03

## 2023-08-03 PROBLEM — R45.89 SYMPTOMS OF DEPRESSION: Status: RESOLVED | Noted: 2021-02-23 | Resolved: 2023-08-03

## 2023-08-03 PROBLEM — R53.83 OTHER FATIGUE: Status: RESOLVED | Noted: 2022-03-02 | Resolved: 2023-08-03

## 2023-08-03 PROBLEM — Z34.90 ENCOUNTER FOR INDUCTION OF LABOR: Status: RESOLVED | Noted: 2023-05-09 | Resolved: 2023-08-03

## 2023-08-03 PROBLEM — M54.2 NECK DISCOMFORT: Status: RESOLVED | Noted: 2021-02-23 | Resolved: 2023-08-03

## 2023-08-03 PROBLEM — N92.0 MENORRHAGIA WITH REGULAR CYCLE: Status: RESOLVED | Noted: 2019-08-06 | Resolved: 2023-08-03

## 2023-09-14 ENCOUNTER — OFFICE VISIT (OUTPATIENT)
Dept: OBGYN CLINIC | Facility: CLINIC | Age: 28
End: 2023-09-14
Payer: COMMERCIAL

## 2023-09-14 VITALS — WEIGHT: 168 LBS | DIASTOLIC BLOOD PRESSURE: 72 MMHG | BODY MASS INDEX: 27.96 KG/M2 | SYSTOLIC BLOOD PRESSURE: 118 MMHG

## 2023-09-14 DIAGNOSIS — Z30.41 ENCOUNTER FOR SURVEILLANCE OF CONTRACEPTIVE PILLS: ICD-10-CM

## 2023-09-14 DIAGNOSIS — Z30.011 INITIATION OF ORAL CONTRACEPTION: ICD-10-CM

## 2023-09-14 PROCEDURE — 99214 OFFICE O/P EST MOD 30 MIN: CPT | Performed by: STUDENT IN AN ORGANIZED HEALTH CARE EDUCATION/TRAINING PROGRAM

## 2023-09-14 RX ORDER — DROSPIRENONE AND ETHINYL ESTRADIOL 0.02-3(28)
1 KIT ORAL DAILY
Qty: 84 TABLET | Refills: 0 | Status: SHIPPED | OUTPATIENT
Start: 2023-09-14

## 2023-09-14 NOTE — PROGRESS NOTES
[facility]  Pill check  Servando Shone, MD  23      Assessment/Plan:  Margarita Frazier is a 56-year-old -0-1-2 on Nazanin for contraception presenting for pill check-has been dealing with intermenstrual spotting that has been causing her some distress in the past 2 months. We reviewed other contraceptive options besides the's including sterilization procedures as she requested more information. We reviewed that female sterilization is considered a permanent and irreversible procedure with increased risk of regret under the age of 27. We reviewed the procedure and surgical risks including bleeding, infection, injury to surrounding structures and risk of regret. We briefly reviewed vasectomy and need for 3-month follow-up to confirm that he is aspermia  We reviewed trial of triphasic pill given spotting prior to her placebo pack in the upcoming week. We reviewed if this continues I would recommend an ultrasound of the pelvis and could also possibly consider adding progestin to week before her placebo week to stabilize to help with spotting. Patient decided to continue Otila Moises a this time and will call if spotting continues. Patient does have also on last Pap 2023 and recommend that she be scheduled for colposcopy. No problem-specific Assessment & Plan notes found for this encounter. Problem List Items Addressed This Visit    None  Visit Diagnoses     Initiation of oral contraception        Relevant Medications    drospirenone-ethinyl estradiol (NAZANIN) 3-0.02 MG per tablet            Subjective:      Patient ID: Yuki Garcia is a 33 y.o. female. HPI  Margarita Frazier is a 56-year-old -0-1-2 on oral contraceptive pill for contraception presenting for pill check-patient reports she has been having abnormal uterine bleeding-following her first pill pack she had a normal menses but thereafter the past 2 months she has been having spotting in the upcoming week to her placebo pill.  She otherwise really enjoys this pill it helps with her with acne and dysmenorrhea. No associated symptoms with spotting. Did inquire about sterilization surgery and her  is also considering vasectomy but recently got new insurance given a change of job. We reviewed different contraceptive options including sterilization as she requested more information. The following portions of the patient's history were reviewed and updated as appropriate: allergies, current medications, past family history, past medical history, past social history, past surgical history and problem list.    Review of Systems    Per HPI    Objective:      /72 (BP Location: Left arm, Patient Position: Sitting, Cuff Size: Standard)   Wt 76.2 kg (168 lb)   LMP 08/15/2023 (Approximate)   BMI 27.96 kg/m²      Physical Exam  Vitals reviewed. Constitutional:       Appearance: Normal appearance. She is not ill-appearing or diaphoretic. Cardiovascular:      Rate and Rhythm: Normal rate. Pulmonary:      Effort: Pulmonary effort is normal. No respiratory distress. Abdominal:      Palpations: Abdomen is soft. Tenderness: There is no abdominal tenderness. There is no guarding or rebound. Musculoskeletal:      Right lower leg: No edema. Left lower leg: No edema. Skin:     General: Skin is warm and dry. Neurological:      Mental Status: She is alert and oriented to person, place, and time.    Psychiatric:         Mood and Affect: Mood normal.         Behavior: Behavior normal.

## 2023-09-15 RX ORDER — DROSPIRENONE AND ETHINYL ESTRADIOL 0.02-3(28)
1 KIT ORAL DAILY
Qty: 84 TABLET | Refills: 0 | OUTPATIENT
Start: 2023-09-15

## 2023-11-13 ENCOUNTER — TELEPHONE (OUTPATIENT)
Dept: OBGYN CLINIC | Facility: CLINIC | Age: 28
End: 2023-11-13

## 2023-11-13 NOTE — TELEPHONE ENCOUNTER
Patient called, left message on answering machine, patient is "having some issues."  Requesting a return call.

## 2023-11-15 DIAGNOSIS — B37.9 YEAST INFECTION: Primary | ICD-10-CM

## 2023-11-15 RX ORDER — FLUCONAZOLE 150 MG/1
150 TABLET ORAL DAILY
Qty: 2 TABLET | Refills: 0 | Status: SHIPPED | OUTPATIENT
Start: 2023-11-15 | End: 2023-11-17

## 2023-11-27 ENCOUNTER — TELEPHONE (OUTPATIENT)
Dept: OBGYN CLINIC | Facility: CLINIC | Age: 28
End: 2023-11-27

## 2023-11-27 DIAGNOSIS — N92.6 IRREGULAR BLEEDING: Primary | ICD-10-CM

## 2023-11-27 NOTE — TELEPHONE ENCOUNTER
Patient called said she is still bleeding and its been 6 mos and wants to know if she wants to have an us before changing the pill and what would the next step be? Would like to speak with someone about needing this appt said she started a new job and would need something later in the day or virtually.

## 2023-11-30 ENCOUNTER — PATIENT MESSAGE (OUTPATIENT)
Dept: OBGYN CLINIC | Facility: CLINIC | Age: 28
End: 2023-11-30

## 2023-11-30 NOTE — TELEPHONE ENCOUNTER
Patient was seen 9/14/23 for intermenstrual spotting - decided to continue Nesha. Patient was scheduled for 12/14/23 pill check follow up but needs to cx due to work. Patient reports since appt her bleeding is lighter than what it was however she continues with spotting and is also getting a regular period approximately every other week. Per patient there is only 1 week out of the month without bleeding. Denies lightheadedness, dizziness, abd pain. Per patient 3 weeks ago she got lightheaded - this has now happened 3 times in a month and felt she was going to pass out - high stress at the time. White in the face, hands shaking, and takes a few minutes to normalize. Reviewed office note from 9/14/23 We reviewed trial of triphasic pill given spotting prior to her placebo pack in the upcoming week. We reviewed if this continues I would recommend an ultrasound of the pelvis and could also possibly consider adding progestin to week before her placebo week to stabilize to help with spotting. Patient decided to continue Garrison Brakeman a this time and will call if spotting continues. Spoke to Dr. Marguarite Ormond on call - order for Hg, TSH and order a pelvic ultrasound and then she can follow up for an appointment so we can review those and her options. Orders place. Placed call to patient, scheduled for 12/18/23.

## 2023-12-04 DIAGNOSIS — Z30.41 ENCOUNTER FOR SURVEILLANCE OF CONTRACEPTIVE PILLS: ICD-10-CM

## 2023-12-05 ENCOUNTER — APPOINTMENT (OUTPATIENT)
Dept: LAB | Facility: CLINIC | Age: 28
End: 2023-12-05
Payer: COMMERCIAL

## 2023-12-05 DIAGNOSIS — N92.6 IRREGULAR BLEEDING: ICD-10-CM

## 2023-12-05 LAB
B-HCG SERPL-ACNC: <1 MIU/ML (ref 0–5)
TSH SERPL DL<=0.05 MIU/L-ACNC: 0.87 UIU/ML (ref 0.45–4.5)

## 2023-12-05 PROCEDURE — 84702 CHORIONIC GONADOTROPIN TEST: CPT

## 2023-12-05 PROCEDURE — 84443 ASSAY THYROID STIM HORMONE: CPT

## 2023-12-05 PROCEDURE — 36415 COLL VENOUS BLD VENIPUNCTURE: CPT

## 2023-12-05 RX ORDER — DROSPIRENONE AND ETHINYL ESTRADIOL 0.02-3(28)
1 KIT ORAL DAILY
Qty: 84 TABLET | Refills: 0 | Status: SHIPPED | OUTPATIENT
Start: 2023-12-05

## 2023-12-13 ENCOUNTER — HOSPITAL ENCOUNTER (OUTPATIENT)
Dept: RADIOLOGY | Age: 28
Discharge: HOME | End: 2023-12-13
Payer: COMMERCIAL

## 2023-12-13 DIAGNOSIS — N92.6 IRREGULAR BLEEDING: ICD-10-CM

## 2023-12-13 PROCEDURE — 76856 US EXAM PELVIC COMPLETE: CPT

## 2023-12-13 PROCEDURE — 76830 TRANSVAGINAL US NON-OB: CPT

## 2023-12-18 ENCOUNTER — OFFICE VISIT (OUTPATIENT)
Dept: OBGYN CLINIC | Facility: CLINIC | Age: 28
End: 2023-12-18
Payer: COMMERCIAL

## 2023-12-18 VITALS
WEIGHT: 169 LBS | SYSTOLIC BLOOD PRESSURE: 120 MMHG | DIASTOLIC BLOOD PRESSURE: 78 MMHG | BODY MASS INDEX: 28.16 KG/M2 | HEIGHT: 65 IN

## 2023-12-18 DIAGNOSIS — Z32.02 NEGATIVE PREGNANCY TEST: ICD-10-CM

## 2023-12-18 DIAGNOSIS — N93.9 ABNORMAL UTERINE BLEEDING: Primary | ICD-10-CM

## 2023-12-18 LAB — SL AMB POCT URINE HCG: NORMAL

## 2023-12-18 PROCEDURE — 81025 URINE PREGNANCY TEST: CPT | Performed by: STUDENT IN AN ORGANIZED HEALTH CARE EDUCATION/TRAINING PROGRAM

## 2023-12-18 PROCEDURE — 99214 OFFICE O/P EST MOD 30 MIN: CPT | Performed by: STUDENT IN AN ORGANIZED HEALTH CARE EDUCATION/TRAINING PROGRAM

## 2023-12-18 NOTE — PROGRESS NOTES
[facility]  Abnormal uterine bleeding  Rosie Baxter MD  23    Assessment/Plan:  28-year-old -0-1-2 on no current contraception that is sexually active with abnormal uterine bleeding-   UPT today negative  We did review her recent blood work of normal thyroid testing and negative beta-hCG.  She did complete a pelvic ultrasound last week that has not clinically read-on my review there does seem to be a small intrauterine polyp which was reviewed with patient today but we will plan to wait for final read of ultrasound by radiology.  We discussed D&C hysteroscopy including the risks, benefits and alternatives of the procedure-risks that were discussed included bleeding, infection, uterine perforation and injury to surrounding structures with need for repeat or future procedures.  We discussed ability to complete colposcopy prior to D&C hysteroscopy and also reviewed possibility of placement of hormonal IUD-we reviewed the risks of the Mirena IUD including bleeding, infection, uterine perforation, migration of device, expulsion, pregnancy and ectopic pregnancy.  We also reviewed other forms of contraception she could do like pills, Depo-Provera, Nexplanon to help with her abnormal uterine bleeding.  She will consider her options and wants the final read on the ultrasound is complete we will review further and she will return if she decides on surgery.      Subjective:      Patient ID: Nayeli Jon is a 28 y.o. female.    PRINCESS Tyler is a 28-year-old -0-1-2, no current contraception presenting for abnormal uterine bleeding-she was having prolonged menses on the pill not to spotting prior week before.  She states discontinued OCP and has not been using anything for contraception.  Patient would be open to pregnancy if it did occur but at this time is not sure if pregnancy is possible.  Does have dysmenorrhea and currently spotting.    Patient also has a history of LSIL Pap in  "6/2023 and is very anxious about colposcopy as she had significant discomfort her last colposcopy.  We did review recommendations for colposcopy and did discuss that this could be done under sedation if that is what main rate ofly the fever is my friends.  This was actually remaining is causing her hesitancy.    She understands that there is a current risk of pregnancy which I recommended using some other form of contraception if she does not desire pregnancy and we also reviewed Plan B if desired.    The following portions of the patient's history were reviewed and updated as appropriate: allergies, current medications, past family history, past medical history, past social history, past surgical history, and problem list.    Review of Systems    Per HPI    Objective:  /78 (BP Location: Left arm, Patient Position: Sitting, Cuff Size: Standard)   Ht 5' 5\" (1.651 m)   Wt 76.7 kg (169 lb)   LMP  (LMP Unknown)   BMI 28.12 kg/m²      Physical Exam  Vitals reviewed.   Constitutional:       General: She is not in acute distress.     Appearance: Normal appearance. She is well-developed. She is not ill-appearing or diaphoretic.   HENT:      Head: Normocephalic and atraumatic.   Cardiovascular:      Rate and Rhythm: Normal rate.   Pulmonary:      Effort: Pulmonary effort is normal. No respiratory distress.   Genitourinary:     Vagina: Normal.   Musculoskeletal:      Right lower leg: No edema.      Left lower leg: No edema.   Skin:     General: Skin is warm and dry.   Neurological:      Mental Status: She is alert and oriented to person, place, and time.   Psychiatric:         Mood and Affect: Mood normal.         Behavior: Behavior normal.           "

## 2023-12-22 ENCOUNTER — TELEPHONE (OUTPATIENT)
Dept: LABOR AND DELIVERY | Facility: HOSPITAL | Age: 28
End: 2023-12-22

## 2024-01-05 ENCOUNTER — TELEPHONE (OUTPATIENT)
Dept: OBGYN CLINIC | Facility: CLINIC | Age: 29
End: 2024-01-05

## 2024-01-05 NOTE — TELEPHONE ENCOUNTER
Placed call to patient, left a non detailed message to return our call.      Appointment on hold for 1/23/24 at 0800.

## 2024-01-05 NOTE — TELEPHONE ENCOUNTER
Patient left message on machine - had a recent follow up with obgyn and was discussing possibility of needing a D&C and then doing another procedure while still under. Was waiting to schedule anything until radiologist confirmed the findings. Saw test result about 1 week ago, thinks from radiologist confirming. Would like to review and if surgery should be scheduled. If so, would like to get it scheduled asap to get it started and talk about new treatment of birth control.

## 2024-01-05 NOTE — TELEPHONE ENCOUNTER
Called and spoke to patient. Reviewed provider's recommendations and appt for surgical discussion. Patient reports she is not interested in IUD, would prefer another pill for contraception. Patient states she had discussed, and would like colposcopy completed under sedation as well. Appt offered, patient states she recently started new job and has limited PTO, will need to use for time of surgery, wondering if Dr. Baxter would be able to accommodate her for an appointment in [place] office any day around 12pm or after 4pm, or after 3pm on Fridays, as she could accommodate that with her work schedule. Patient aware at this time no available appts within her preferences but will review with Dr. Baxter. If no availability within those time preferences, patient reports she will figure it out with her schedule. Patient has no additional questions at this time.

## 2024-01-08 ENCOUNTER — TELEPHONE (OUTPATIENT)
Dept: LABOR AND DELIVERY | Facility: HOSPITAL | Age: 29
End: 2024-01-08

## 2024-01-08 DIAGNOSIS — N92.1 BREAKTHROUGH BLEEDING: Primary | ICD-10-CM

## 2024-01-08 RX ORDER — NORGESTIMATE AND ETHINYL ESTRADIOL 7DAYSX3 LO
1 KIT ORAL DAILY
Qty: 28 TABLET | Refills: 3 | Status: SHIPPED | OUTPATIENT
Start: 2024-01-08

## 2024-01-08 NOTE — TELEPHONE ENCOUNTER
Patient called back, left message on answering machine, confirmed 1/15/24 at 11:45 appointment. Scheduled.     Patient is requesting if birth control will be sent to pharmacy for her to start now.

## 2024-01-08 NOTE — TELEPHONE ENCOUNTER
Placed call to patient to offer appt with Dr. Baxter 1/15/24 [id number] in [facility]. Appt placed on hold. Left message on machine for patient to return call to office.

## 2024-01-08 NOTE — TELEPHONE ENCOUNTER
Placed call to patient, she is is interested in increasing the dose and switching the birth control.  Patient stated this was briefly touched upon at OV to help control BTB. Patient uses [facility] on [address].

## 2024-01-15 ENCOUNTER — PATIENT MESSAGE (OUTPATIENT)
Dept: OBGYN CLINIC | Facility: CLINIC | Age: 29
End: 2024-01-15

## 2024-01-15 ENCOUNTER — OFFICE VISIT (OUTPATIENT)
Dept: OBGYN CLINIC | Facility: CLINIC | Age: 29
End: 2024-01-15
Payer: COMMERCIAL

## 2024-01-15 VITALS
SYSTOLIC BLOOD PRESSURE: 112 MMHG | BODY MASS INDEX: 28.66 KG/M2 | HEIGHT: 65 IN | WEIGHT: 172 LBS | DIASTOLIC BLOOD PRESSURE: 78 MMHG

## 2024-01-15 DIAGNOSIS — N93.9 ABNORMAL UTERINE BLEEDING (AUB): Primary | ICD-10-CM

## 2024-01-15 PROBLEM — Z3A.39 39 WEEKS GESTATION OF PREGNANCY: Status: RESOLVED | Noted: 2023-05-09 | Resolved: 2024-01-15

## 2024-01-15 PROCEDURE — 99214 OFFICE O/P EST MOD 30 MIN: CPT | Performed by: STUDENT IN AN ORGANIZED HEALTH CARE EDUCATION/TRAINING PROGRAM

## 2024-01-15 NOTE — PATIENT INSTRUCTIONS
Hysteroscopy   WHAT YOU NEED TO KNOW:   A hysteroscopy is a procedure to find and treat problems in your uterus. A hysteroscopy may be done to find, and possibly treat, the cause of abnormal vaginal bleeding, problems getting pregnant, or miscarriage. It may also be done to insert or remove a device that prevents pregnancy.        DISCHARGE INSTRUCTIONS:   Call 911 if:   You have trouble breathing.      Seek care immediately if:   You have heavy vaginal bleeding that fills 1 or more sanitary pads in 1 hour.    You have severe pain or bloating in your abdomen.     You feel lightheaded, weak, and confused.     You see blood in your urine.    You stop urinating or urinate less than usual.    Contact your healthcare provider if:   You have a fever or chills.    You have pus or a foul-smelling odor coming from your vagina.     You see blood clots on your sanitary pad that are larger than the size of a quarter.     You have nausea or are vomiting.    Your skin is itchy, swollen, or you have a rash.    You have questions or concerns about your condition or care.    Medicines:  You may need any of the following:  Estrogen  helps heal the lining of your uterus.     Antibiotics  help prevent a bacterial infection.    Prescription pain medicine  may be given. Ask your healthcare provider how to take this medicine safely. Some prescription pain medicines contain acetaminophen. Do not take other medicines that contain acetaminophen without talking to your healthcare provider. Too much acetaminophen may cause liver damage. Prescription pain medicine may cause constipation. Ask your healthcare provider how to prevent or treat constipation.     NSAIDs , such as ibuprofen, help decrease swelling, pain, and fever. NSAIDs can cause stomach bleeding or kidney problems in certain people. If you take blood thinner medicine, always ask your healthcare provider if NSAIDs are safe for you. Always read the medicine label and follow  directions.    Take your medicine as directed.  Contact your healthcare provider if you think your medicine is not helping or if you have side effects. Tell your provider if you are allergic to any medicine. Keep a list of the medicines, vitamins, and herbs you take. Include the amounts, and when and why you take them. Bring the list or the pill bottles to follow-up visits. Carry your medicine list with you in case of an emergency.    Self-care:  Do not have sex, use tampons, or douche for 6 weeks. These actions may cause an infection. Ask your healthcare provider if it is okay to take a tub bath or swim. Rest as needed. Do not drive, return to work, or exercise for 24 hours or as directed. You can return to most activities in 1 to 2 days.   Follow up with your doctor as directed:  Write down your questions so you remember to ask them during your visits.  © Copyright [employer] 2023 Information is for End User's use only and may not be sold, redistributed or otherwise used for commercial purposes.  The above information is an  only. It is not intended as medical advice for individual conditions or treatments. Talk to your doctor, nurse or pharmacist before following any medical regimen to see if it is safe and effective for you.

## 2024-01-15 NOTE — H&P
History & Physical - OB/GYN   Nayeli Jon 28 y.o. female MRN: [id number]  Unit/Bed#:  Encounter: [id number]      HPI:  Nayeli Jon is a 28 y.o.  on OCPs for contraception presenting for surgical discussion for abnormal uterine bleeding-patient has been having irregular cycles and prolonged bleeding including intermenstrual bleeding.  Had recent ultrasound completed showing signs of likely adenomyosis and endocervical polyp.  She was recently started a new OCP Ortho Tri-Cyclen.     Pap 2023- LSIL- has been very anxious about colposcopy given discomfort form prior and has declined in past.       Active Problems:  Patient Active Problem List   Diagnosis    Family history of congenital anomaly    Family history of genetic disease    PMS (premenstrual syndrome)    Migraine without aura and without status migrainosus, not intractable    ASCUS with positive high risk HPV cervical    Bipolar 2 disorder (HCC)    Iron deficiency anemia    39 weeks gestation of pregnancy     (spontaneous vaginal delivery)    Annual physical exam     PMH:  Past Medical History:   Diagnosis Date    Anxiety     no meds during pregnancy    Depression     no meds during pregnancy    Migraines     Miscarriage     Varicella     IMMUNE       PSH:  History reviewed. No pertinent surgical history.    Social Hx:  Social History     Socioeconomic History    Marital status: Single     Spouse name: Not on file    Number of children: Not on file    Years of education: 2 year college    Highest education level: Not on file   Occupational History    Occupation: Manager   Tobacco Use    Smoking status: Former     Current packs/day: 0.00     Average packs/day: 0.5 packs/day for 5.0 years (2.5 ttl pk-yrs)     Types: Cigarettes     Start date: 2011     Quit date: 2016     Years since quittin.0    Smokeless tobacco: Never   Vaping Use    Vaping status: Former    Substances: THC, CBD   Substance and Sexual Activity    Alcohol use:  Yes     Comment: social- Rare    Drug use: Not Currently     Types: Marijuana     Comment: Vape, daily     Sexual activity: Yes     Partners: Female, Male     Birth control/protection: None   Other Topics Concern    Not on file   Social History Narrative    · Most recent tobacco use screenin2019      · Alcohol pre-pregnancy:   None      · Alcohol intake:   None      · Smoking pre-pregnancy:   Yes  occasional smoker     · Is blood transfusion acceptable in an emergency:   Yes      · Is anesthesia consult planned:   Yes  desires epidural     · Marital status:   Single  In a relationship x8yrs     · Presence of domestic violence:   No      · Sexual orientation:   Heterosexual      · Caffeine intake:   Occasional      · Diet:   Regular      · Exercise level:   Occasional      · General stress level:   Medium      · Guns present in home:   No      · Sunscreen used routinely:   Yes      · Seat belt/car seat used routinely:   Yes      · Languages spoken:   English      · Breast feeding:   No      · Are you currently employed:   Yes      · Have you ever been transfused with blood or blood products:   No      · Do you feel safe at home:   Yes      · Live alone or with others:   with others      · Seat belts used routinely:   Yes      Social Determinants of Health     Financial Resource Strain: Not on file   Food Insecurity: Not on file   Transportation Needs: Not on file   Physical Activity: Not on file   Stress: Not on file   Social Connections: Not on file   Intimate Partner Violence: Not on file   Housing Stability: Not on file     Family History   Problem Relation Age of Onset    Breast cancer Mother 40    Ovarian cancer Mother     Depression Mother     Diabetes Mother     Kidney cancer Mother     Anxiety disorder Mother     Hypertension Father         dad    Other Father         glycogen storage disease due to acid maltase deficiency -infusions weekly    Diabetes Maternal Aunt     Hypertension Maternal  "Grandmother     Kidney cancer Maternal Grandfather     Kidney cancer Other     Brain cancer Other     Anxiety disorder Brother     No Known Problems Son        OB Hx:  OB History    Para Term  AB Living   3 2 2 0 1 2   SAB IAB Ectopic Multiple Live Births   1 0 0 0 2      # Outcome Date GA Lbr Thai/2nd Weight Sex Delivery Anes PTL Lv   3 Term 05/10/23 39w4d / 00:06 3530 g (7 lb 12.5 oz) M Vag-Spont EPI N NILESH      Name: ARIE SEE (MEME)      Apgar1: 9  Apgar5: 9   2 Term 18 41w1d / 00:35 3487 g (7 lb 11 oz) M Vag-Spont EPI  NILESH      Name: ARIE SEE  (MEME)      Apgar1: 9  Apgar5: 9   1 SAB 17               Meds:  Current Outpatient Medications on File Prior to Visit   Medication Sig Dispense Refill    norgestimate-ethinyl estradiol (Ortho Tri-Cyclen Lo) 0.18/0.215/0.25 MG-25 MCG per tablet Take 1 tablet by mouth daily 28 tablet 3     No current facility-administered medications on file prior to visit.       Allergies:  Allergies   Allergen Reactions    Shellfish-Derived Products - Food Allergy Anaphylaxis     Review of Systems   Constitutional:  Negative for chills and fever.   Eyes:  Negative for visual disturbance.   Respiratory:  Negative for chest tightness, shortness of breath and wheezing.    Cardiovascular:  Negative for chest pain, palpitations and leg swelling.   Gastrointestinal:  Negative for abdominal pain, constipation, diarrhea, nausea and vomiting.   Genitourinary:  Negative for dysuria, flank pain, frequency, hematuria and urgency.   Musculoskeletal:  Negative for arthralgias and myalgias.   Neurological:  Positive for headaches. Negative for dizziness, weakness and light-headedness.         Physical Exam:  /78 (BP Location: Left arm, Patient Position: Sitting, Cuff Size: Standard)   Ht 5' 5\" (1.651 m)   Wt 78 kg (172 lb)   LMP 2024   BMI 28.62 kg/m²     Physical Exam  Vitals reviewed.   Constitutional:       General: She is not in acute " distress.     Appearance: She is well-developed. She is not diaphoretic.   HENT:      Head: Normocephalic and atraumatic.   Cardiovascular:      Rate and Rhythm: Normal rate and regular rhythm.      Heart sounds: Normal heart sounds. No murmur heard.     No friction rub. No gallop.   Pulmonary:      Effort: Pulmonary effort is normal. No respiratory distress.      Breath sounds: Normal breath sounds. No wheezing or rales.   Abdominal:      Palpations: Abdomen is soft.      Tenderness: There is no abdominal tenderness. There is no guarding or rebound.   Genitourinary:     Vagina: Normal.   Musculoskeletal:      Cervical back: Normal range of motion and neck supple.      Right lower leg: No edema.      Left lower leg: No edema.   Skin:     General: Skin is warm and dry.   Neurological:      Mental Status: She is alert and oriented to person, place, and time.   Psychiatric:         Mood and Affect: Mood normal.         Behavior: Behavior normal.         Assessment/ Plan:   28 y.o.  with AUB and polyp on ultrasound- we reviewed polyp and adenomyosis for likely causes of AUB- reviewed hysteroscopy D&C and polypectomy and offered progestin IUD- she is amendable to hysteroscopy and polypectomy but declines IUD at this time and would prefer to continue OCPs following for contraception.  We also reviewed colposcopy at the time of procedure given patient hesitation to have complete secondary to discomfort and she is amendable.  We reviewed the risks of hysteroscopy D&C and colposcopy with possible biopsies including bleeding, infection, uterine perforation, injury to surrounding structures including the bladder, bowel, ureters and nerves and vessels of the pelvis, need for future repeat procedures.  Nayeli had an opportunity ask questions which were answered to the best my ability and informed consent was obtained today.  We reviewed next step with surgical scheduling calling her and scheduling her procedure.   Chlorhexidine soap and instructions given.    Rosie Baxter MD  01/15/24

## 2024-01-29 DIAGNOSIS — N92.1 BREAKTHROUGH BLEEDING: ICD-10-CM

## 2024-01-29 RX ORDER — NORGESTIMATE-ETHINYL ESTRADIOL 7DAYSX3 LO
1 TABLET ORAL DAILY
Qty: 84 TABLET | Refills: 2 | Status: SHIPPED | OUTPATIENT
Start: 2024-01-29

## 2024-03-11 ENCOUNTER — ANESTHESIA EVENT (OUTPATIENT)
Dept: PERIOP | Facility: HOSPITAL | Age: 29
End: 2024-03-11
Payer: COMMERCIAL

## 2024-03-15 RX ORDER — ALBUTEROL SULFATE 90 UG/1
AEROSOL, METERED RESPIRATORY (INHALATION)
COMMUNITY
Start: 2024-02-04

## 2024-03-15 NOTE — PRE-PROCEDURE INSTRUCTIONS
Pre-Surgery Instructions:   Medication Instructions    albuterol (PROVENTIL HFA,VENTOLIN HFA) 90 mcg/act inhaler Uses PRN- OK to take day of surgery    Ortho Tri-Cyclen Lo 0.18/0.215/0.25 MG-25 MCG per tablet Take day of surgery.    Medication instructions for day surgery reviewed. Please use only a sip of water to take your instructed medications. Avoid all over the counter vitamins, supplements and NSAIDS for one week prior to surgery per anesthesia guidelines. Tylenol is ok to take as needed.     You will receive a call one business day prior to surgery with an arrival time and hospital directions. If your surgery is scheduled on a Monday, the hospital will be calling you on the Friday prior to your surgery. If you have not heard from anyone by 8pm, please call the hospital supervisor through the hospital  at [phone]. ([place] [phone] or [place] [phone]).    Do not eat or drink anything after midnight the night before your surgery, including candy, mints, lifesavers, or chewing gum. Do not drink alcohol 24hrs before your surgery. Try not to smoke at least 24hrs before your surgery.       Follow the pre surgery showering instructions as listed in the “My Surgical Experience Booklet” or otherwise provided by your surgeon's office. Do not use a blade to shave the surgical area 1 week before surgery. It is okay to use a clean electric clippers up to 24 hours before surgery. Do not apply any lotions, creams, including makeup, cologne, deodorant, or perfumes after showering on the day of your surgery. Do not use dry shampoo, hair spray, hair gel, or any type of hair products.     No contact lenses, eye make-up, or artificial eyelashes. Remove nail polish, including gel polish, and any artificial, gel, or acrylic nails if possible. Remove all jewelry including rings and body piercing jewelry.     Wear causal clothing that is easy to take on and off. Consider your type of surgery.    Keep any  valuables, jewelry, piercings at home. Please bring any specially ordered equipment (sling, braces) if indicated.    Arrange for a responsible person to drive you to and from the hospital on the day of your surgery. Please confirm the visitor policy for the day of your procedure when you receive your phone call with an arrival time.     Call the surgeon's office with any new illnesses, exposures, or additional questions prior to surgery.    Please reference your “My Surgical Experience Booklet” for additional information to prepare for your upcoming surgery.

## 2024-03-18 PROCEDURE — NC001 PR NO CHARGE: Performed by: STUDENT IN AN ORGANIZED HEALTH CARE EDUCATION/TRAINING PROGRAM

## 2024-03-19 ENCOUNTER — ANESTHESIA (OUTPATIENT)
Dept: PERIOP | Facility: HOSPITAL | Age: 29
End: 2024-03-19
Payer: COMMERCIAL

## 2024-03-19 ENCOUNTER — HOSPITAL ENCOUNTER (OUTPATIENT)
Facility: HOSPITAL | Age: 29
Setting detail: OUTPATIENT SURGERY
Discharge: HOME | End: 2024-03-19
Attending: STUDENT IN AN ORGANIZED HEALTH CARE EDUCATION/TRAINING PROGRAM | Admitting: STUDENT IN AN ORGANIZED HEALTH CARE EDUCATION/TRAINING PROGRAM
Payer: COMMERCIAL

## 2024-03-19 VITALS
TEMPERATURE: 97.6 F | DIASTOLIC BLOOD PRESSURE: 56 MMHG | WEIGHT: 166.8 LBS | SYSTOLIC BLOOD PRESSURE: 107 MMHG | HEART RATE: 65 BPM | BODY MASS INDEX: 27.79 KG/M2 | HEIGHT: 65 IN | OXYGEN SATURATION: 100 % | RESPIRATION RATE: 14 BRPM

## 2024-03-19 DIAGNOSIS — N93.9 ABNORMAL UTERINE BLEEDING: ICD-10-CM

## 2024-03-19 PROBLEM — Z98.890 STATUS POST HYSTEROSCOPY: Status: ACTIVE | Noted: 2024-03-19

## 2024-03-19 LAB
ERYTHROCYTE [DISTWIDTH] IN BLOOD BY AUTOMATED COUNT: 12.4 % (ref 11.6–15.1)
EXT PREGNANCY TEST URINE: NEGATIVE
EXT. CONTROL: NORMAL
HCT VFR BLD AUTO: 36.5 % (ref 34.8–46.1)
HGB BLD-MCNC: 12 G/DL (ref 11.5–15.4)
MCH RBC QN AUTO: 29.9 PG (ref 26.8–34.3)
MCHC RBC AUTO-ENTMCNC: 32.9 G/DL (ref 31.4–37.4)
MCV RBC AUTO: 91 FL (ref 82–98)
PLATELET # BLD AUTO: 224 THOUSANDS/UL (ref 149–390)
PMV BLD AUTO: 10.9 FL (ref 8.9–12.7)
RBC # BLD AUTO: 4.01 MILLION/UL (ref 3.81–5.12)
WBC # BLD AUTO: 5.27 THOUSAND/UL (ref 4.31–10.16)

## 2024-03-19 PROCEDURE — 57454 BX/CURETT OF CERVIX W/SCOPE: CPT | Performed by: STUDENT IN AN ORGANIZED HEALTH CARE EDUCATION/TRAINING PROGRAM

## 2024-03-19 PROCEDURE — 85027 COMPLETE CBC AUTOMATED: CPT | Performed by: ANESTHESIOLOGY

## 2024-03-19 PROCEDURE — 58558 HYSTEROSCOPY BIOPSY: CPT | Performed by: STUDENT IN AN ORGANIZED HEALTH CARE EDUCATION/TRAINING PROGRAM

## 2024-03-19 PROCEDURE — 88305 TISSUE EXAM BY PATHOLOGIST: CPT | Performed by: PATHOLOGY

## 2024-03-19 PROCEDURE — 81025 URINE PREGNANCY TEST: CPT | Performed by: STUDENT IN AN ORGANIZED HEALTH CARE EDUCATION/TRAINING PROGRAM

## 2024-03-19 PROCEDURE — 88344 IMHCHEM/IMCYTCHM EA MLT ANTB: CPT | Performed by: PATHOLOGY

## 2024-03-19 RX ORDER — HYDROMORPHONE HCL/PF 1 MG/ML
0.5 SYRINGE (ML) INJECTION
Status: DISCONTINUED | OUTPATIENT
Start: 2024-03-19 | End: 2024-03-19 | Stop reason: HOSPADM

## 2024-03-19 RX ORDER — ACETAMINOPHEN 325 MG/1
975 TABLET ORAL ONCE
Status: COMPLETED | OUTPATIENT
Start: 2024-03-19 | End: 2024-03-19

## 2024-03-19 RX ORDER — ONDANSETRON 2 MG/ML
INJECTION INTRAMUSCULAR; INTRAVENOUS AS NEEDED
Status: DISCONTINUED | OUTPATIENT
Start: 2024-03-19 | End: 2024-03-19

## 2024-03-19 RX ORDER — FENTANYL CITRATE/PF 50 MCG/ML
50 SYRINGE (ML) INJECTION
Status: DISCONTINUED | OUTPATIENT
Start: 2024-03-19 | End: 2024-03-19 | Stop reason: HOSPADM

## 2024-03-19 RX ORDER — MAGNESIUM HYDROXIDE 1200 MG/15ML
LIQUID ORAL AS NEEDED
Status: DISCONTINUED | OUTPATIENT
Start: 2024-03-19 | End: 2024-03-19 | Stop reason: HOSPADM

## 2024-03-19 RX ORDER — EPHEDRINE SULFATE 50 MG/ML
INJECTION INTRAVENOUS AS NEEDED
Status: DISCONTINUED | OUTPATIENT
Start: 2024-03-19 | End: 2024-03-19

## 2024-03-19 RX ORDER — ACETAMINOPHEN 325 MG/1
975 TABLET ORAL EVERY 6 HOURS PRN
Status: DISCONTINUED | OUTPATIENT
Start: 2024-03-19 | End: 2024-03-19 | Stop reason: HOSPADM

## 2024-03-19 RX ORDER — ALBUTEROL SULFATE 90 UG/1
2 AEROSOL, METERED RESPIRATORY (INHALATION) EVERY 4 HOURS PRN
Status: CANCELLED | OUTPATIENT
Start: 2024-03-19

## 2024-03-19 RX ORDER — IBUPROFEN 600 MG/1
600 TABLET ORAL EVERY 6 HOURS PRN
Status: DISCONTINUED | OUTPATIENT
Start: 2024-03-19 | End: 2024-03-19 | Stop reason: HOSPADM

## 2024-03-19 RX ORDER — PROPOFOL 10 MG/ML
INJECTION, EMULSION INTRAVENOUS CONTINUOUS PRN
Status: DISCONTINUED | OUTPATIENT
Start: 2024-03-19 | End: 2024-03-19

## 2024-03-19 RX ORDER — METOCLOPRAMIDE HYDROCHLORIDE 5 MG/ML
10 INJECTION INTRAMUSCULAR; INTRAVENOUS ONCE AS NEEDED
Status: DISCONTINUED | OUTPATIENT
Start: 2024-03-19 | End: 2024-03-19 | Stop reason: HOSPADM

## 2024-03-19 RX ORDER — KETOROLAC TROMETHAMINE 30 MG/ML
INJECTION, SOLUTION INTRAMUSCULAR; INTRAVENOUS AS NEEDED
Status: DISCONTINUED | OUTPATIENT
Start: 2024-03-19 | End: 2024-03-19

## 2024-03-19 RX ORDER — SODIUM CHLORIDE, SODIUM LACTATE, POTASSIUM CHLORIDE, CALCIUM CHLORIDE 600; 310; 30; 20 MG/100ML; MG/100ML; MG/100ML; MG/100ML
125 INJECTION, SOLUTION INTRAVENOUS CONTINUOUS
Status: DISCONTINUED | OUTPATIENT
Start: 2024-03-19 | End: 2024-03-19 | Stop reason: HOSPADM

## 2024-03-19 RX ORDER — PROPOFOL 10 MG/ML
INJECTION, EMULSION INTRAVENOUS AS NEEDED
Status: DISCONTINUED | OUTPATIENT
Start: 2024-03-19 | End: 2024-03-19

## 2024-03-19 RX ORDER — IODINE SOLUTION STRONG 5% (LUGOL'S) 5 %
SOLUTION ORAL AS NEEDED
Status: DISCONTINUED | OUTPATIENT
Start: 2024-03-19 | End: 2024-03-19 | Stop reason: HOSPADM

## 2024-03-19 RX ORDER — LIDOCAINE HCL/PF 100 MG/5ML
SYRINGE (ML) INJECTION AS NEEDED
Status: DISCONTINUED | OUTPATIENT
Start: 2024-03-19 | End: 2024-03-19

## 2024-03-19 RX ORDER — FENTANYL CITRATE 50 UG/ML
INJECTION, SOLUTION INTRAMUSCULAR; INTRAVENOUS AS NEEDED
Status: DISCONTINUED | OUTPATIENT
Start: 2024-03-19 | End: 2024-03-19

## 2024-03-19 RX ADMIN — PROPOFOL 100 MCG/KG/MIN: 10 INJECTION, EMULSION INTRAVENOUS at 07:56

## 2024-03-19 RX ADMIN — EPHEDRINE SULFATE 5 MG: 50 INJECTION, SOLUTION INTRAVENOUS at 08:05

## 2024-03-19 RX ADMIN — FENTANYL CITRATE 50 MCG: 50 INJECTION INTRAMUSCULAR; INTRAVENOUS at 07:56

## 2024-03-19 RX ADMIN — EPHEDRINE SULFATE 5 MG: 50 INJECTION, SOLUTION INTRAVENOUS at 08:26

## 2024-03-19 RX ADMIN — SODIUM CHLORIDE 8 MCG: 9 INJECTION, SOLUTION INTRAVENOUS at 08:24

## 2024-03-19 RX ADMIN — ONDANSETRON 4 MG: 2 INJECTION INTRAMUSCULAR; INTRAVENOUS at 07:56

## 2024-03-19 RX ADMIN — SODIUM CHLORIDE 8 MCG: 9 INJECTION, SOLUTION INTRAVENOUS at 07:59

## 2024-03-19 RX ADMIN — SODIUM CHLORIDE 4 MCG: 9 INJECTION, SOLUTION INTRAVENOUS at 08:12

## 2024-03-19 RX ADMIN — FENTANYL CITRATE 50 MCG: 50 INJECTION INTRAMUSCULAR; INTRAVENOUS at 08:18

## 2024-03-19 RX ADMIN — SODIUM CHLORIDE 8 MCG: 9 INJECTION, SOLUTION INTRAVENOUS at 08:18

## 2024-03-19 RX ADMIN — SODIUM CHLORIDE 8 MCG: 9 INJECTION, SOLUTION INTRAVENOUS at 07:56

## 2024-03-19 RX ADMIN — SODIUM CHLORIDE, SODIUM LACTATE, POTASSIUM CHLORIDE, AND CALCIUM CHLORIDE 125 ML/HR: .6; .31; .03; .02 INJECTION, SOLUTION INTRAVENOUS at 07:27

## 2024-03-19 RX ADMIN — LIDOCAINE HYDROCHLORIDE 100 MG: 20 INJECTION INTRAVENOUS at 07:56

## 2024-03-19 RX ADMIN — EPHEDRINE SULFATE 5 MG: 50 INJECTION, SOLUTION INTRAVENOUS at 08:09

## 2024-03-19 RX ADMIN — SODIUM CHLORIDE 4 MCG: 9 INJECTION, SOLUTION INTRAVENOUS at 08:06

## 2024-03-19 RX ADMIN — PROPOFOL 50 MG: 10 INJECTION, EMULSION INTRAVENOUS at 07:59

## 2024-03-19 RX ADMIN — ACETAMINOPHEN 975 MG: 325 TABLET, FILM COATED ORAL at 07:24

## 2024-03-19 RX ADMIN — PROPOFOL 50 MG: 10 INJECTION, EMULSION INTRAVENOUS at 07:56

## 2024-03-19 RX ADMIN — KETOROLAC TROMETHAMINE 30 MG: 30 INJECTION, SOLUTION INTRAMUSCULAR; INTRAVENOUS at 08:28

## 2024-03-19 NOTE — ANESTHESIA PREPROCEDURE EVALUATION
Procedure:  DILATATION AND CURETTAGE (D&C) WITH HYSTEROSCOPY, POLYPECTOMY (Uterus)  COLPOSCOPY, EUA (Vagina )    Relevant Problems   CARDIO   (+) Migraine without aura and without status migrainosus, not intractable      HEMATOLOGY   (+) Iron deficiency anemia      NEURO/PSYCH   (+) Migraine without aura and without status migrainosus, not intractable        Physical Exam    Airway    Mallampati score: I  TM Distance: >3 FB  Neck ROM: full     Dental   No notable dental hx     Cardiovascular      Pulmonary      Other Findings  post-pubertal.      Anesthesia Plan  ASA Score- 2     Anesthesia Type- IV sedation with anesthesia with ASA Monitors.         Additional Monitors:     Airway Plan:            Plan Factors-Exercise tolerance (METS): >4 METS.    Chart reviewed.   Existing labs reviewed. Patient summary reviewed.    Patient is a current smoker.  Patient instructed to abstain from smoking on day of procedure. Patient did not smoke on day of surgery.    There is medical exclusion for perioperative obstructive sleep apnea risk education.        Induction- intravenous.    Postoperative Plan- Plan for postoperative opioid use.     Informed Consent- Anesthetic plan and risks discussed with patient.  I personally reviewed this patient with the CRNA. Discussed and agreed on the Anesthesia Plan with the CRNA..

## 2024-03-19 NOTE — OP NOTE
OPERATIVE REPORT  PATIENT NAME: Nayeli Jon    :  1995  MRN: [id number]  Pt Location: [facility] OR ROOM 03    SURGERY DATE: 3/19/2024    Surgeons and Role:     * Rosie Baxter MD - Primary        Diana Avila MD- Assisting       Preop Diagnosis:  Abnormal uterine bleeding [N93.9]  Low grade squamous intraepithelial lesion    Post-Op Diagnosis Codes:     * Abnormal uterine bleeding [N93.9]    Procedure(s):  DILATATION AND CURETTAGE (D&C) WITH HYSTEROSCOPY  COLPOSCOPY. CERVICAL BIOPSY AND ECC. EUA    Specimen(s):  ID Type Source Tests Collected by Time Destination   1 : [facility] - ENDOCERVICAL CURRETAGE Tissue Endocervical TISSUE EXAM Rosie Baxter MD 3/19/2024 0811    2 : 1 O'CLOCK BIOPSY OF CERVIX Tissue Cervix TISSUE EXAM Rosie Baxter MD 3/19/2024 0812    3 : 6 O'CLOCK BIOPSY OF CERVIX Tissue Cervix TISSUE EXAM Rosie Baxter MD 3/19/2024 0813    4 : [facility]- ENDOMETRIAL CURRETTINGS Tissue Endometrium TISSUE EXAM Rosie Baxter MD 3/19/2024 0825        Estimated Blood Loss:   Minimal    Drains:  none    Anesthesia Type:   IV Sedation with Anesthesia    Operative Indications:  Abnormal uterine bleeding [N93.9]  LGSIL    Operative Findings:  Grossly normal external genitalia   Grossly normal vaginal mucosa   Upon application of lugols, decreased uptake noted at 6 o'clock and 1 O'Clock . There was no mosaicism or punctation noted.   Anteverted uterus with Grade 2 decensus  Small amount of proliferative tissue noted in the Endocervical canal without distinct polyp  Grossly normal ostia bilaterally with grossly normal amount of endometrial lining      Complications:   None apparent     Procedure and Technique:  The patient was correctly identified as and taken to the OR where general anesthesia was obtained without difficulty.  She was placed in the dorsal lithotomy position.   The patient  was examined under anesthesia and was found to have an anteverted uterus with normal  adnexa.      The cervix was isolated easily and lugols was liberally applied; the cervix was inspected  and noted to have decreased uptake noted at 6 o'clock and 1 O'Clock  positions. There was no mosaicism or punctation noted. The SCJ was seen in its entirety.   A Tischler  biopsy curette was used to perform a colposcopically directed biopsy was  taken at 0100 and 0600. An endocervical curettage was performed using kevorkian curette.        Next attention was turned toward hysteroscopy portion of the procedure. She was prepped and draped in a sterile fashion. A Right angle tractor and raul  retractor was inserted into the vagina and the anterior lip of the cervix was visualized and grasped with a single toothed tenaculum.       Uterus was sounded to 8.5cm. Using Vishal dilators, the cervix was progressively dilated to about 18mm.      A 5 mm 0 degree Hysteroscope was inserted. The entire endometrial cavity was visualized as well as bilateral ostia. The endometrial cavity appeared unremarkable with normal amount of endometrial tissue. Hysteroscope was removed.The uterine cavity and endocervical canal was systematically curetted with a sharp curette and the curettings sent for analysis. Fluid deficit was 50 cc.     The tenaculum and retractor were removed. Monsel's were applied to tenaculum site and colposcopy site where slow oozing was noted. Hemostasis was confirmed.     The patient tolerated the procedure well. Sponge and instrument count were correct x 3.     Dr. Baxter was present for the entirety of the procedure.     Patient Disposition:  PACU         SIGNATURE: Diana Avila MD  DATE: March 19, 2024  TIME: 8:36 AM

## 2024-03-19 NOTE — H&P
History & Physical - OB/GYN   Nayeli Jon 28 y.o. female MRN: [id number]  Unit/Bed#: OR [place] Encounter: [id number]    HPI:  Nayeli Jon is a 28 y.o.  with AUB-P for hysteroscoopy D&C and colposcopy for LSIL on pap. Please see H&P from 1/15/2024 for full history and counseling. Nayeli denies any interval changes in health.    Pap 2023- LSIL   Contraception: OCPs    Active Problems:  Patient Active Problem List   Diagnosis    Family history of congenital anomaly    Family history of genetic disease    PMS (premenstrual syndrome)    Migraine without aura and without status migrainosus, not intractable    ASCUS with positive high risk HPV cervical    Bipolar 2 disorder (HCC)    Iron deficiency anemia    Annual physical exam     PMH:  Past Medical History:   Diagnosis Date    Anxiety     no meds during pregnancy    Bipolar disorder (HCC)     Depression     no meds during pregnancy    GERD (gastroesophageal reflux disease)     Migraines     Miscarriage     Reactive airway disease     Varicella     IMMUNE       PSH:  Past Surgical History:   Procedure Laterality Date    WISDOM TOOTH EXTRACTION         Social Hx:  Social History     Socioeconomic History    Marital status: Single     Spouse name: Not on file    Number of children: Not on file    Years of education: 2 year college    Highest education level: Not on file   Occupational History    Occupation: Manager   Tobacco Use    Smoking status: Former     Current packs/day: 0.00     Average packs/day: 0.5 packs/day for 5.0 years (2.5 ttl pk-yrs)     Types: Cigarettes     Start date: 2011     Quit date: 2016     Years since quittin.2    Smokeless tobacco: Never   Vaping Use    Vaping status: Former   Substance and Sexual Activity    Alcohol use: Yes     Comment: Rarely 1-2x per year    Drug use: Yes     Frequency: 7.0 times per week     Types: Marijuana     Comment: Vape, daily     Sexual activity: Yes     Partners: Female, Male     Birth  control/protection: None   Other Topics Concern    Not on file   Social History Narrative    · Most recent tobacco use screenin2019      · Alcohol pre-pregnancy:   None      · Alcohol intake:   None      · Smoking pre-pregnancy:   Yes  occasional smoker     · Is blood transfusion acceptable in an emergency:   Yes      · Is anesthesia consult planned:   Yes  desires epidural     · Marital status:   Single  In a relationship x8yrs     · Presence of domestic violence:   No      · Sexual orientation:   Heterosexual      · Caffeine intake:   Occasional      · Diet:   Regular      · Exercise level:   Occasional      · General stress level:   Medium      · Guns present in home:   No      · Sunscreen used routinely:   Yes      · Seat belt/car seat used routinely:   Yes      · Languages spoken:   English      · Breast feeding:   No      · Are you currently employed:   Yes      · Have you ever been transfused with blood or blood products:   No      · Do you feel safe at home:   Yes      · Live alone or with others:   with others      · Seat belts used routinely:   Yes      Social Determinants of Health     Financial Resource Strain: Not on file   Food Insecurity: Not on file   Transportation Needs: Not on file   Physical Activity: Not on file   Stress: Not on file   Social Connections: Not on file   Intimate Partner Violence: Not on file   Housing Stability: Not on file     OB Hx:  OB History    Para Term  AB Living   3 2 2 0 1 2   SAB IAB Ectopic Multiple Live Births   1 0 0 0 2      # Outcome Date GA Lbr Thai/2nd Weight Sex Delivery Anes PTL Lv   3 Term 05/10/23 39w4d / 00:06 3530 g (7 lb 12.5 oz) M Vag-Spont EPI N NILESH      Name: ESABABY BOY (MEME)      Apgar1: 9  Apgar5: 9   2 Term 18 41w1d / 00:35 3487 g (7 lb 11 oz) M Vag-Spont EPI  NILESH      Name: ESABABY BOY  (MEME)      Apgar1: 9  Apgar5: 9   1 SAB 17             Family History   Problem Relation Age of Onset     "Breast cancer Mother 40    Ovarian cancer Mother     Depression Mother     Diabetes Mother     Kidney cancer Mother     Anxiety disorder Mother     Hypertension Father         dad    Other Father         glycogen storage disease due to acid maltase deficiency -infusions weekly    Diabetes Maternal Aunt     Hypertension Maternal Grandmother     Kidney cancer Maternal Grandfather     Kidney cancer Other     Brain cancer Other     Anxiety disorder Brother     No Known Problems Son      Meds:  No current facility-administered medications on file prior to encounter.     Current Outpatient Medications on File Prior to Encounter   Medication Sig Dispense Refill    albuterol (PROVENTIL HFA,VENTOLIN HFA) 90 mcg/act inhaler INHALE 2 PUFFS EVERY 4 TO 6 HOURS AS NEEDED FOR BREATHING      Ortho Tri-Cyclen Lo 0.18/0.215/0.25 MG-25 MCG per tablet TAKE 1 TABLET BY MOUTH EVERY DAY 84 tablet 2       Allergies:  Allergies   Allergen Reactions    Shellfish-Derived Products - Food Allergy Anaphylaxis     Review of Systems   Constitutional:  Negative for chills and fever.   Eyes:  Negative for visual disturbance.   Respiratory:  Negative for chest tightness, shortness of breath and wheezing.    Cardiovascular:  Negative for chest pain, palpitations and leg swelling.   Gastrointestinal:  Negative for abdominal pain, constipation, diarrhea, nausea and vomiting.   Genitourinary:  Positive for vaginal bleeding. Negative for dysuria, flank pain, frequency, hematuria and urgency.   Musculoskeletal:  Negative for arthralgias and myalgias.   Neurological:  Negative for dizziness, weakness, light-headedness and headaches.        Physical Exam:  /68   Pulse 79   Temp 98.5 °F (36.9 °C) (Temporal)   Resp 16   Ht 5' 5\" (1.651 m)   Wt 75.7 kg (166 lb 12.8 oz)   LMP 03/01/2024 (Approximate)   SpO2 98%   BMI 27.76 kg/m²     Physical Exam  Vitals reviewed.   Constitutional:       General: She is not in acute distress.     Appearance: Normal " appearance. She is well-developed. She is not ill-appearing or diaphoretic.   HENT:      Head: Normocephalic and atraumatic.   Cardiovascular:      Rate and Rhythm: Normal rate and regular rhythm.      Heart sounds: Normal heart sounds. No murmur heard.     No friction rub. No gallop.   Pulmonary:      Effort: Pulmonary effort is normal. No respiratory distress.      Breath sounds: Normal breath sounds. No wheezing or rales.   Abdominal:      Palpations: Abdomen is soft.      Tenderness: There is no abdominal tenderness. There is no guarding or rebound.   Genitourinary:     Vagina: Normal.   Musculoskeletal:      Cervical back: Normal range of motion and neck supple.      Right lower leg: No edema.      Left lower leg: No edema.   Skin:     General: Skin is warm and dry.   Neurological:      Mental Status: She is alert and oriented to person, place, and time.   Psychiatric:         Mood and Affect: Mood normal.         Behavior: Behavior normal.           Assessment/ Plan::   28 y.o.  with AUB-P and LSIL on pap   Plan to proceed to OR for hysteroscopy D&C and colposcopy.  Tylenol preop   FEN: NPO,  cc/hr  DVT Ppx: SCDs

## 2024-03-19 NOTE — ANESTHESIA POSTPROCEDURE EVALUATION
Post-Op Assessment Note    CV Status:  Stable  Pain Score: 0    Pain management: adequate       Mental Status:  Sleepy and arousable   Hydration Status:  Stable   PONV Controlled:  Controlled   Airway Patency:  Patent     Post Op Vitals Reviewed: Yes    No anethesia notable event occurred.    Staff: CRNA               BP   117/66   Temp   98.6   Pulse  64   Resp   12   SpO2   100

## 2024-03-21 ENCOUNTER — NURSE TRIAGE (OUTPATIENT)
Age: 29
End: 2024-03-21

## 2024-03-21 ENCOUNTER — PATIENT MESSAGE (OUTPATIENT)
Dept: OBGYN CLINIC | Facility: CLINIC | Age: 29
End: 2024-03-21

## 2024-03-21 NOTE — TELEPHONE ENCOUNTER
"Patient called in with concerns of passing tissue/clots.  She got a D&C on 03/19.  Denies dizziness, lightheadedness, excessive bleeding.  States she gets abdominal cramping and then passes one of these clots/tissue.  States it is the length of her hand and has happened a couple times today.  She is taking tylenol and ibuprofen for the cramping that she does get.      Reviewed with patient bleeding precautions and when to contact the office - advised to continue to monitor this at home.  TT sent to provider that  performed procedure to get recommendations.  Advised I would reach out when I hear back from her.      Provider recommended to continue to monitor at home.  Advised to call back if bleeding becomes heavy (1-2 pads an hour), if she gets dizzy/lightheaded, has any fevers or chills.  Patient agreeable with plan of care - will call office with any additional concerns     Reason for Disposition   Passed tissue (e.g., gray-white)    Answer Assessment - Initial Assessment Questions  1. AMOUNT: \"Describe the bleeding that you are having.\"     - SPOTTING: spotting, or pinkish / brownish mucous discharge; does not fill panti-liner or pad     - MILD:  less than 1 pad / hour; less than patient's usual menstrual bleeding    - MODERATE: 1-2 pads / hour; 1 menstrual cup every 6 hours; small-medium blood clots (e.g., pea, grape, small coin)    - SEVERE: soaking 2 or more pads/hour for 2 or more hours; 1 menstrual cup every 2 hours; bleeding not contained by pads or continuous red blood from vagina; large blood clots (e.g., golf ball, large coin)       Moderate - clots/tissue    2. ONSET: \"When did the bleeding begin?\" \"Is it continuing now?\"      Yesterday - smaller clots yesterday, larger ones today.      3. MENSTRUAL PERIOD: \"When was the last normal menstrual period?\" \"How is this different than your period?\"      N/a    4. REGULARITY: \"How regular are your periods?\"      N/a    5. ABDOMINAL PAIN: \"Do you have any " "pain?\" \"How bad is the pain?\"  (e.g., Scale 1-10; mild, moderate, or severe)    - MILD (1-3): doesn't interfere with normal activities, abdomen soft and not tender to touch     - MODERATE (4-7): interferes with normal activities or awakens from sleep, tender to touch     - SEVERE (8-10): excruciating pain, doubled over, unable to do any normal activities       Mild intermittent tenderness    12. OTHER SYMPTOMS: \"What other symptoms are you having with the bleeding?\" (e.g., passed tissue, vaginal discharge, fever, menstrual-type cramps)        Passed tissue    Protocols used: Vaginal Bleeding - Abnormal-ADULT-[place]    "

## 2024-03-25 ENCOUNTER — TELEPHONE (OUTPATIENT)
Age: 29
End: 2024-03-25

## 2024-03-25 PROCEDURE — 88305 TISSUE EXAM BY PATHOLOGIST: CPT | Performed by: PATHOLOGY

## 2024-03-25 PROCEDURE — 88344 IMHCHEM/IMCYTCHM EA MLT ANTB: CPT | Performed by: PATHOLOGY

## 2024-03-25 NOTE — TELEPHONE ENCOUNTER
Patient called back  to speak with Dr Baxter regarding biopsy results. Message routed to provider.

## 2024-03-26 ENCOUNTER — TELEPHONE (OUTPATIENT)
Dept: OBGYN CLINIC | Facility: CLINIC | Age: 29
End: 2024-03-26

## 2024-03-26 NOTE — TELEPHONE ENCOUNTER
Call placed to patient to review results of recent D&C and colposcopy biopsies.  No answer and voicemail left to return call to office to review results.    ARCELIA-2 noted on biopsy at the 1 o'clock position of the cervix-recommend close monitoring with colposcopy versus excisional procedure depending on counseling and patient's desires.    Will try and call patient again later time.

## 2024-04-08 ENCOUNTER — OFFICE VISIT (OUTPATIENT)
Dept: OBGYN CLINIC | Facility: CLINIC | Age: 29
End: 2024-04-08
Payer: COMMERCIAL

## 2024-04-08 VITALS
HEIGHT: 65 IN | BODY MASS INDEX: 27.99 KG/M2 | SYSTOLIC BLOOD PRESSURE: 116 MMHG | WEIGHT: 168 LBS | DIASTOLIC BLOOD PRESSURE: 72 MMHG

## 2024-04-08 DIAGNOSIS — Z23 NEED FOR HPV VACCINATION: Primary | ICD-10-CM

## 2024-04-08 PROCEDURE — 90471 IMMUNIZATION ADMIN: CPT

## 2024-04-08 PROCEDURE — 99024 POSTOP FOLLOW-UP VISIT: CPT | Performed by: STUDENT IN AN ORGANIZED HEALTH CARE EDUCATION/TRAINING PROGRAM

## 2024-04-08 PROCEDURE — 90651 9VHPV VACCINE 2/3 DOSE IM: CPT

## 2024-04-08 RX ORDER — OSELTAMIVIR PHOSPHATE 75 MG/1
75 CAPSULE ORAL 2 TIMES DAILY
COMMUNITY
Start: 2024-02-03

## 2024-04-08 RX ORDER — AMOXICILLIN AND CLAVULANATE POTASSIUM 875; 125 MG/1; MG/1
1 TABLET, FILM COATED ORAL EVERY 12 HOURS
COMMUNITY
Start: 2024-03-03

## 2024-04-08 RX ORDER — AMOXICILLIN AND CLAVULANATE POTASSIUM 600; 42.9 MG/5ML; MG/5ML
POWDER, FOR SUSPENSION ORAL
COMMUNITY
Start: 2024-03-03

## 2024-04-08 RX ORDER — METHYLPREDNISOLONE 4 MG/1
TABLET ORAL
COMMUNITY
Start: 2024-03-03

## 2024-04-08 NOTE — PATIENT INSTRUCTIONS
Loop Electrosurgical Excision Procedure   WHAT YOU NEED TO KNOW:   What do I need to know about a loop electrosurgical excision procedure (LEEP)?  A LEEP is a procedure to remove abnormal tissue from your cervix or vagina. The tissue can be tested for cancer or infection. You may need a LEEP if other tests have found abnormal cells on your cervix or in your vagina.   How do I prepare for a LEEP?  Your healthcare provider will talk to you about how to prepare for your procedure. Do not douche, use tampons, or have sex for 24 hours before the procedure. Do not put medicines in your vagina for 24 hours before the procedure. Call your healthcare provider if you have your menstrual period on the day of the procedure. You may need to wait until your period ends to have the procedure. Arrange for someone to drive you home and stay with you after your procedure.   What will happen during a LEEP?   Your healthcare provider will insert a speculum in your vagina. A speculum is an instrument that holds the vagina open so your provider can see your cervix. You will be given local anesthesia to numb your cervix. With local anesthesia, you may feel pressure or pushing during your procedure, but you should not feel pain.     Your healthcare provider will insert a tube with a looped wire at the end into your vagina. He or she will use this instrument to remove a sample of tissue from your cervix. You may feel pain or cramping when the sample is taken. You may also feel dizzy. Tell your healthcare provider if the dizziness gets worse. Your healthcare provider may use a paste or tools to control bleeding.    What will happen after a LEEP?  Your healthcare provider will monitor you for heavy bleeding. You may have cramping, bleeding, or dark brown discharge after your procedure. These symptoms may last up to 4 weeks.   What are the risks of a LEEP?  You may bleed more than expected or get an infection. Your menstrual periods may feel  more painful after the procedure. You may have problems getting pregnant or be at risk for a miscarriage or  birth. If you do get pregnant, your baby may be underweight.   CARE AGREEMENT:   You have the right to help plan your care. Learn about your health condition and how it may be treated. Discuss treatment options with your healthcare providers to decide what care you want to receive. You always have the right to refuse treatment. The above information is an  only. It is not intended as medical advice for individual conditions or treatments. Talk to your doctor, nurse or pharmacist before following any medical regimen to see if it is safe and effective for you.  © 2023 Information is for End User's use only and may not be sold, redistributed or otherwise used for commercial purposes.

## 2024-04-08 NOTE — PROGRESS NOTES
[facility]  Post-Op Visit  Rosie Baxter MD  04/08/24      Assessment   Patient is a 28 y.o. y.o. female who is 3 weeks post op s/p D&C hysteroscopy and colposcopy with biopsy and ECC who is doing well post-operatively.  Operative findings reviewed.   Pathology report discussed- normal results on D&C  ARCELIA 2 noted on biopsy at 1 o'clock for colposcopy     Plan    1. Pain management PRN: Motrin/ tylenol PRN  2. ARCELIA 2 on colposcopic biopsy: We reviewed biopsy results and pathophysiology of HPV and how dysplasia - in her case moderate- is a precursor to cervical cancer. We reviewed options of an cervical excisional procedure, versus surveillance and expectant management-we discussed the risk and benefits of each option and patient is most interested in proceeding with a LEEP procedure.  We reviewed the procedure, risks including bleeding, infection, injury to surrounding organs such as the vagina, bladder, ureters, bowel and nerves and vessels of the pelvis, need for repeat or future procedures.  She had an opportunity to ask questions and informed consent was obtained today.  3. Activity restrictions: none  4. Reviewed HPV vaccination series including risks and benefits particularly given patient current colposcopic biopsy and the fact that she works in an [employer] office to reduce risk of oropharyngeal cancer- reviewed vaccination series and spacing- amendable to receive today and received.   5. Follow up: for LEEP procedure and post op    Subjective    Nayeli Jon is a 28 y.o. female who presents to the office 3 weeks status post operative hysteroscopy and colposcopy abnormal uterine bleeding and ACSUS and HPV pos pap. She is eating a regular diet without difficulty. Urination and bowel movements are normal. She denies any pain now- did have cramping after procedure. Did have a menses that was completely normal.     Shellfish-derived products - food allergy    Current Outpatient Medications:      "albuterol (PROVENTIL HFA,VENTOLIN HFA) 90 mcg/act inhaler, INHALE 2 PUFFS EVERY 4 TO 6 HOURS AS NEEDED FOR BREATHING, Disp: , Rfl:     Ortho Tri-Cyclen Lo 0.18/0.215/0.25 MG-25 MCG per tablet, TAKE 1 TABLET BY MOUTH EVERY DAY, Disp: 84 tablet, Rfl: 2    amoxicillin-clavulanate (AUGMENTIN) 600-42.9 MG/5ML suspension, TAKE 1 AND 1/2 TEASPOONFULS BY MOUTH TWICE A DAY WITH FOOD OR MILK FOR 10 DAYS. DISCARD REMAINDER (Patient not taking: Reported on 4/8/2024), Disp: , Rfl:     amoxicillin-clavulanate (AUGMENTIN) 875-125 mg per tablet, Take 1 tablet by mouth every 12 (twelve) hours (Patient not taking: Reported on 4/8/2024), Disp: , Rfl:     methylPREDNISolone 4 MG tablet therapy pack, TAKE 6 TABLETS ON DAY 1 AS DIRECTED ON PACKAGE AND DECREASE BY 1 TAB EACH DAY FOR A TOTAL OF 6 DAYS (Patient not taking: Reported on 4/8/2024), Disp: , Rfl:     oseltamivir (TAMIFLU) 75 mg capsule, Take 75 mg by mouth 2 (two) times a day (Patient not taking: Reported on 4/8/2024), Disp: , Rfl:       Review of Systems  Denies Fevers/chills/N/V/Constipation/Vaginal bleeding/excessive pain/dysuria/frequency of urination. Also as noted in HPI.     PMH/ PSH/ SH/ PFH/ allergies and medications were reviewed and updated during this visit.     Objective   /72 (BP Location: Left arm, Patient Position: Sitting, Cuff Size: Standard)   Ht 5' 5\" (1.651 m)   Wt 76.2 kg (168 lb)   LMP 04/03/2024   BMI 27.96 kg/m²   Physical Exam  Vitals reviewed.   Constitutional:       General: She is not in acute distress.     Appearance: Normal appearance. She is normal weight. She is not ill-appearing or diaphoretic.   HENT:      Head: Normocephalic and atraumatic.   Cardiovascular:      Rate and Rhythm: Normal rate and regular rhythm.      Heart sounds: Normal heart sounds. No murmur heard.     No gallop.   Pulmonary:      Effort: Pulmonary effort is normal. No respiratory distress.      Breath sounds: Normal breath sounds. No stridor. No wheezing, rhonchi " or rales.   Abdominal:      Palpations: Abdomen is soft.      Tenderness: There is no abdominal tenderness. There is no guarding or rebound.   Musculoskeletal:      Right lower leg: No edema.      Left lower leg: No edema.   Skin:     General: Skin is warm and dry.   Neurological:      Mental Status: She is alert and oriented to person, place, and time.   Psychiatric:         Mood and Affect: Mood normal.         Behavior: Behavior normal.

## 2024-04-15 ENCOUNTER — TELEPHONE (OUTPATIENT)
Dept: GYNECOLOGY | Facility: CLINIC | Age: 29
End: 2024-04-15

## 2024-04-15 NOTE — TELEPHONE ENCOUNTER
----- Message from Rosie Baxter MD sent at 2024  1:09 PM EDT -----  Regarding: Shedule LEEP procedure  Hi Bridgett!  I hope this finds you well!     [facility] GYN Department  Surgery Scheduling Sheet    Patient Name: Nayeli Jon  : 1995    Provider: Rosie Baxter MD     Needed: no; Language: N/A    Procedure: exam under anesthesia and loop electrosurgical excisional procedure    Diagnosis: Cervical dysplasia, ARCELIA 2    Special Needs or Equipment: None    Anesthesia: IV sedation with anesthesia    Length of stay: outpatient  Does patient have comorbid conditions that will require close perioperative monitoring prior to safe discharge: no    The patient has comorbid conditions that will require close perioperative monitoring prior to safe discharge, including N/A.   This may require acute care beyond the usual and routine recovery period. As such, inpatient admission post-operatively is expected and appropriate, and anticipated hospital length of stay will be >2 midnights.    Pre-Admission Testing Needed: no   Labs that should be ordered: urine pregnancy test    Order PAT that is recommended in prep for procedure?: No    Medical Clearance Needed: no; Provider: N/A    MA Form Signed (tubals/hysterectomy): Not Indicated    Surgical Drink Given: no     How many days out of work: 3 day(s)     How many days no drivin day(s)       Is pre op appt needed?  Yes   Interval for post op appt: 2 week(s)     For Surgical Scheduler:     Surgery Scheduled On:  [place]: first available    Pre-op Appt:   Post op Appt:  Consult/Medical clearance appt:

## 2024-05-13 ENCOUNTER — TELEPHONE (OUTPATIENT)
Dept: OBGYN CLINIC | Facility: CLINIC | Age: 29
End: 2024-05-13

## 2024-05-13 DIAGNOSIS — B37.31 YEAST VAGINITIS: Primary | ICD-10-CM

## 2024-05-13 RX ORDER — FLUCONAZOLE 150 MG/1
150 TABLET ORAL ONCE
Qty: 1 TABLET | Refills: 0 | Status: SHIPPED | OUTPATIENT
Start: 2024-05-13 | End: 2024-05-13

## 2024-05-16 ENCOUNTER — NURSE TRIAGE (OUTPATIENT)
Age: 29
End: 2024-05-16

## 2024-05-16 NOTE — TELEPHONE ENCOUNTER
Regarding: Vaginal discomfort  ----- Message from Petra PERES sent at 5/16/2024  8:07 AM EDT -----  Pt was just treated for yeast infection, but still feeling some vaginal discomfort

## 2024-05-16 NOTE — TELEPHONE ENCOUNTER
"Nayeli reports very minor intermittent itch after treatment with monistat and diflucan.  Denies vaginal discharge, irritation or odor.  Completed treatment on Monday.      Recommended vitamin A/D ointment, aquaphor or coconut oil.  Increase water intake.  If symptoms do not completely resolve in next 3 days, call back to schedule pelvic exam.     Reason for Disposition  • Mild vaginal itching    Answer Assessment - Initial Assessment Questions  1. SYMPTOM: \"What's the main symptom you're concerned about?\" (e.g., pain, itching, dryness)     Mild itching  2. LOCATION: \"Where is the  itching located?\" (e.g., inside/outside, left/right)      vagina  3. ONSET: \"When did the  itching  start?\"      Has been ongoing-previously treatment   4. PAIN: \"Is there any pain?\" If Yes, ask: \"How bad is it?\" (Scale: 1-10; mild, moderate, severe)      denies  5. ITCHING: \"Is there any itching?\" If Yes, ask: \"How bad is it?\" (Scale: 1-10; mild, moderate, severe)      Mild itching  6. CAUSE: \"What do you think is causing the discharge?\" \"Have you had the same problem before? What happened then?\"      denies  7. OTHER SYMPTOMS: \"Do you have any other symptoms?\" (e.g., fever, itching, vaginal bleeding, pain with urination, injury to genital area, vaginal foreign body)      Mild irritation  8. PREGNANCY: \"Is there any chance you are pregnant?\" \"When was your last menstrual period?\"      LMP Unsure- 2nd week of OCP    Protocols used: Vaginal Symptoms-ADULT-[place]    "

## (undated) DEVICE — ASTOUND STANDARD SURGICAL GOWN, XXL: Brand: CONVERTORS

## (undated) DEVICE — GLOVE PI ULTRA TOUCH SZ.7.0

## (undated) DEVICE — MAYO STAND COVER: Brand: CONVERTORS

## (undated) DEVICE — TISSUE REMOVAL SYSTEM FLUID MANAGEMENT ACCESSORIES: Brand: SYMPHION

## (undated) DEVICE — CYSTO TUBING SINGLE IRRIGATION

## (undated) DEVICE — GLOVE INDICATOR PI UNDERGLOVE SZ 7.5 BLUE

## (undated) DEVICE — PVC URETHRAL CATHETER: Brand: DOVER

## (undated) DEVICE — BETHLEHEM UNIVERSAL MINOR VAG: Brand: CARDINAL HEALTH